# Patient Record
Sex: FEMALE | Race: WHITE | Employment: OTHER | URBAN - METROPOLITAN AREA
[De-identification: names, ages, dates, MRNs, and addresses within clinical notes are randomized per-mention and may not be internally consistent; named-entity substitution may affect disease eponyms.]

---

## 2017-06-28 ENCOUNTER — APPOINTMENT (OUTPATIENT)
Dept: GENERAL RADIOLOGY | Age: 62
End: 2017-06-28

## 2017-06-28 ENCOUNTER — HOSPITAL ENCOUNTER (EMERGENCY)
Age: 62
Discharge: OTHER INSTITUTION | End: 2017-06-28
Attending: EMERGENCY MEDICINE

## 2017-06-28 VITALS
RESPIRATION RATE: 18 BRPM | HEART RATE: 61 BPM | TEMPERATURE: 98.6 F | BODY MASS INDEX: 34.99 KG/M2 | WEIGHT: 210 LBS | OXYGEN SATURATION: 97 % | DIASTOLIC BLOOD PRESSURE: 64 MMHG | SYSTOLIC BLOOD PRESSURE: 147 MMHG | HEIGHT: 65 IN

## 2017-06-28 DIAGNOSIS — R07.9 CHEST PAIN, UNSPECIFIED TYPE: Primary | ICD-10-CM

## 2017-06-28 LAB
ALBUMIN SERPL-MCNC: 4.2 G/DL (ref 3.9–4.9)
ALP BLD-CCNC: 70 U/L (ref 40–130)
ALT SERPL-CCNC: 15 U/L (ref 0–33)
ANION GAP SERPL CALCULATED.3IONS-SCNC: 17 MEQ/L (ref 7–13)
AST SERPL-CCNC: 20 U/L (ref 0–35)
BASOPHILS ABSOLUTE: 0 K/UL (ref 0–0.2)
BASOPHILS RELATIVE PERCENT: 0.5 %
BILIRUB SERPL-MCNC: 0.3 MG/DL (ref 0–1.2)
BUN BLDV-MCNC: 17 MG/DL (ref 8–23)
CALCIUM SERPL-MCNC: 9.7 MG/DL (ref 8.6–10.2)
CHLORIDE BLD-SCNC: 101 MEQ/L (ref 98–107)
CO2: 23 MEQ/L (ref 22–29)
CREAT SERPL-MCNC: 0.75 MG/DL (ref 0.5–0.9)
D DIMER: 0.58 MG/L FEU (ref 0–0.5)
EOSINOPHILS ABSOLUTE: 0.2 K/UL (ref 0–0.7)
EOSINOPHILS RELATIVE PERCENT: 2.9 %
GFR AFRICAN AMERICAN: >60
GFR NON-AFRICAN AMERICAN: >60
GLOBULIN: 2.7 G/DL (ref 2.3–3.5)
GLUCOSE BLD-MCNC: 132 MG/DL (ref 74–109)
HCT VFR BLD CALC: 34 % (ref 37–47)
HEMOGLOBIN: 11.7 G/DL (ref 12–16)
INR BLD: 0.9
LYMPHOCYTES ABSOLUTE: 2.9 K/UL (ref 1–4.8)
LYMPHOCYTES RELATIVE PERCENT: 39.2 %
MAGNESIUM: 1.8 MG/DL (ref 1.7–2.3)
MCH RBC QN AUTO: 28.9 PG (ref 27–31.3)
MCHC RBC AUTO-ENTMCNC: 34.4 % (ref 33–37)
MCV RBC AUTO: 84 FL (ref 82–100)
MONOCYTES ABSOLUTE: 0.5 K/UL (ref 0.2–0.8)
MONOCYTES RELATIVE PERCENT: 6.1 %
NEUTROPHILS ABSOLUTE: 3.8 K/UL (ref 1.4–6.5)
NEUTROPHILS RELATIVE PERCENT: 51.3 %
PDW BLD-RTO: 14.1 % (ref 11.5–14.5)
PLATELET # BLD: 260 K/UL (ref 130–400)
POTASSIUM SERPL-SCNC: 4 MEQ/L (ref 3.5–5.1)
PROTHROMBIN TIME: 9.6 SEC (ref 9.6–12.3)
RBC # BLD: 4.05 M/UL (ref 4.2–5.4)
SODIUM BLD-SCNC: 141 MEQ/L (ref 132–144)
TOTAL PROTEIN: 6.9 G/DL (ref 6.4–8.1)
TROPONIN: <0.01 NG/ML (ref 0–0.01)
WBC # BLD: 7.5 K/UL (ref 4.8–10.8)

## 2017-06-28 PROCEDURE — 6360000002 HC RX W HCPCS: Performed by: EMERGENCY MEDICINE

## 2017-06-28 PROCEDURE — 96375 TX/PRO/DX INJ NEW DRUG ADDON: CPT

## 2017-06-28 PROCEDURE — 80053 COMPREHEN METABOLIC PANEL: CPT

## 2017-06-28 PROCEDURE — 85379 FIBRIN DEGRADATION QUANT: CPT

## 2017-06-28 PROCEDURE — 93005 ELECTROCARDIOGRAM TRACING: CPT

## 2017-06-28 PROCEDURE — 85025 COMPLETE CBC W/AUTO DIFF WBC: CPT

## 2017-06-28 PROCEDURE — 84484 ASSAY OF TROPONIN QUANT: CPT

## 2017-06-28 PROCEDURE — 96374 THER/PROPH/DIAG INJ IV PUSH: CPT

## 2017-06-28 PROCEDURE — 36415 COLL VENOUS BLD VENIPUNCTURE: CPT

## 2017-06-28 PROCEDURE — 93005 ELECTROCARDIOGRAM TRACING: CPT | Performed by: EMERGENCY MEDICINE

## 2017-06-28 PROCEDURE — 99285 EMERGENCY DEPT VISIT HI MDM: CPT

## 2017-06-28 PROCEDURE — 83735 ASSAY OF MAGNESIUM: CPT

## 2017-06-28 PROCEDURE — 71010 XR CHEST PORTABLE: CPT

## 2017-06-28 PROCEDURE — 85610 PROTHROMBIN TIME: CPT

## 2017-06-28 PROCEDURE — 6370000000 HC RX 637 (ALT 250 FOR IP): Performed by: EMERGENCY MEDICINE

## 2017-06-28 RX ORDER — MORPHINE SULFATE 2 MG/ML
2 INJECTION, SOLUTION INTRAMUSCULAR; INTRAVENOUS ONCE
Status: COMPLETED | OUTPATIENT
Start: 2017-06-28 | End: 2017-06-28

## 2017-06-28 RX ORDER — ASPIRIN 81 MG/1
243 TABLET, CHEWABLE ORAL ONCE
Status: COMPLETED | OUTPATIENT
Start: 2017-06-28 | End: 2017-06-28

## 2017-06-28 RX ORDER — SODIUM CHLORIDE 0.9 % (FLUSH) 0.9 %
3 SYRINGE (ML) INJECTION EVERY 8 HOURS
Status: DISCONTINUED | OUTPATIENT
Start: 2017-06-28 | End: 2017-06-29 | Stop reason: HOSPADM

## 2017-06-28 RX ORDER — ONDANSETRON 2 MG/ML
4 INJECTION INTRAMUSCULAR; INTRAVENOUS ONCE
Status: COMPLETED | OUTPATIENT
Start: 2017-06-28 | End: 2017-06-28

## 2017-06-28 RX ADMIN — ASPIRIN 81 MG CHEWABLE TABLET 243 MG: 81 TABLET CHEWABLE at 20:06

## 2017-06-28 RX ADMIN — Medication 400 MG: at 21:08

## 2017-06-28 RX ADMIN — ONDANSETRON 4 MG: 2 INJECTION INTRAMUSCULAR; INTRAVENOUS at 22:29

## 2017-06-28 RX ADMIN — NITROGLYCERIN 0.5 INCH: 20 OINTMENT TOPICAL at 20:06

## 2017-06-28 RX ADMIN — MORPHINE SULFATE 2 MG: 2 INJECTION, SOLUTION INTRAMUSCULAR; INTRAVENOUS at 22:29

## 2017-06-28 ASSESSMENT — PAIN DESCRIPTION - ORIENTATION: ORIENTATION: LEFT

## 2017-06-28 ASSESSMENT — ENCOUNTER SYMPTOMS
ABDOMINAL PAIN: 0
CHEST TIGHTNESS: 0
FACIAL SWELLING: 0
EYE DISCHARGE: 0
EYE REDNESS: 0
CHOKING: 0
WHEEZING: 0
EYE PAIN: 0
COUGH: 1
SORE THROAT: 0
SINUS PRESSURE: 0
BACK PAIN: 0
CONSTIPATION: 0
STRIDOR: 0
TROUBLE SWALLOWING: 0
DIARRHEA: 0
BLOOD IN STOOL: 0
VOICE CHANGE: 0
VOMITING: 0
SHORTNESS OF BREATH: 0

## 2017-06-28 ASSESSMENT — PAIN DESCRIPTION - PAIN TYPE
TYPE: ACUTE PAIN
TYPE: ACUTE PAIN

## 2017-06-28 ASSESSMENT — PAIN DESCRIPTION - LOCATION
LOCATION: BREAST
LOCATION: CHEST
LOCATION: CHEST

## 2017-06-28 ASSESSMENT — PAIN DESCRIPTION - DESCRIPTORS
DESCRIPTORS: SHARP
DESCRIPTORS: ACHING

## 2017-06-28 ASSESSMENT — PAIN SCALES - GENERAL
PAINLEVEL_OUTOF10: 8
PAINLEVEL_OUTOF10: 6
PAINLEVEL_OUTOF10: 5
PAINLEVEL_OUTOF10: 4

## 2017-07-02 LAB
EKG ATRIAL RATE: 67 BPM
EKG P AXIS: 16 DEGREES
EKG P-R INTERVAL: 164 MS
EKG Q-T INTERVAL: 406 MS
EKG QRS DURATION: 104 MS
EKG QTC CALCULATION (BAZETT): 429 MS
EKG R AXIS: -19 DEGREES
EKG T AXIS: 27 DEGREES
EKG VENTRICULAR RATE: 67 BPM

## 2019-04-12 ENCOUNTER — OFFICE VISIT (OUTPATIENT)
Dept: INTERNAL MEDICINE | Age: 64
End: 2019-04-12
Payer: MEDICARE

## 2019-04-12 VITALS
SYSTOLIC BLOOD PRESSURE: 128 MMHG | TEMPERATURE: 98 F | BODY MASS INDEX: 34.79 KG/M2 | RESPIRATION RATE: 18 BRPM | HEART RATE: 87 BPM | DIASTOLIC BLOOD PRESSURE: 72 MMHG | WEIGHT: 208.8 LBS | HEIGHT: 65 IN | OXYGEN SATURATION: 96 %

## 2019-04-12 DIAGNOSIS — J06.9 UPPER RESPIRATORY TRACT INFECTION, UNSPECIFIED TYPE: Primary | ICD-10-CM

## 2019-04-12 PROCEDURE — 1036F TOBACCO NON-USER: CPT | Performed by: NURSE PRACTITIONER

## 2019-04-12 PROCEDURE — 3017F COLORECTAL CA SCREEN DOC REV: CPT | Performed by: NURSE PRACTITIONER

## 2019-04-12 PROCEDURE — 1123F ACP DISCUSS/DSCN MKR DOCD: CPT | Performed by: NURSE PRACTITIONER

## 2019-04-12 PROCEDURE — G8400 PT W/DXA NO RESULTS DOC: HCPCS | Performed by: NURSE PRACTITIONER

## 2019-04-12 PROCEDURE — G8417 CALC BMI ABV UP PARAM F/U: HCPCS | Performed by: NURSE PRACTITIONER

## 2019-04-12 PROCEDURE — 1090F PRES/ABSN URINE INCON ASSESS: CPT | Performed by: NURSE PRACTITIONER

## 2019-04-12 PROCEDURE — 99203 OFFICE O/P NEW LOW 30 MIN: CPT | Performed by: NURSE PRACTITIONER

## 2019-04-12 PROCEDURE — 4040F PNEUMOC VAC/ADMIN/RCVD: CPT | Performed by: NURSE PRACTITIONER

## 2019-04-12 PROCEDURE — G8427 DOCREV CUR MEDS BY ELIG CLIN: HCPCS | Performed by: NURSE PRACTITIONER

## 2019-04-12 RX ORDER — FLECAINIDE ACETATE 50 MG/1
TABLET ORAL
Refills: 3 | COMMUNITY
Start: 2019-04-11 | End: 2020-11-16 | Stop reason: ALTCHOICE

## 2019-04-12 RX ORDER — TIZANIDINE 4 MG/1
TABLET ORAL
Refills: 1 | COMMUNITY
Start: 2019-04-09 | End: 2020-11-16 | Stop reason: ALTCHOICE

## 2019-04-12 RX ORDER — AMOXICILLIN 500 MG/1
500 CAPSULE ORAL 2 TIMES DAILY
Qty: 20 CAPSULE | Refills: 0 | Status: SHIPPED | OUTPATIENT
Start: 2019-04-12 | End: 2019-04-22

## 2019-04-12 RX ORDER — CALCIUM CARBONATE/VITAMIN D3 500-10/5ML
400 LIQUID (ML) ORAL
COMMUNITY

## 2019-04-12 RX ORDER — OMEPRAZOLE 40 MG/1
40 CAPSULE, DELAYED RELEASE ORAL
COMMUNITY
Start: 2019-01-10 | End: 2020-11-16 | Stop reason: ALTCHOICE

## 2019-04-12 RX ORDER — GABAPENTIN 300 MG/1
CAPSULE ORAL
Refills: 2 | COMMUNITY
Start: 2019-03-24 | End: 2020-11-16 | Stop reason: ALTCHOICE

## 2019-04-12 RX ORDER — FAMOTIDINE 20 MG
1000 TABLET ORAL
COMMUNITY

## 2019-04-12 ASSESSMENT — ENCOUNTER SYMPTOMS
COUGH: 1
SINUS PRESSURE: 0
SORE THROAT: 1
RHINORRHEA: 0

## 2019-04-12 NOTE — PROGRESS NOTES
Patient: Jason Sanchez    YOB: 1955    Date: 4/19/19     There are no active problems to display for this patient. Past Medical History:   Diagnosis Date    Atrial fibrillation ([address]     Hyperlipidemia      Past Surgical History:   Procedure Laterality Date    APPENDECTOMY      BACK SURGERY      CHOLECYSTECTOMY      COLON SURGERY      HYSTERECTOMY       History reviewed. No pertinent family history.   Social History     Socioeconomic History    Marital status:      Spouse name: Not on file    Number of children: Not on file    Years of education: Not on file    Highest education level: Not on file   Occupational History    Not on file   Social Needs    Financial resource strain: Not on file    Food insecurity:     Worry: Not on file     Inability: Not on file    Transportation needs:     Medical: Not on file     Non-medical: Not on file   Tobacco Use    Smoking status: Never Smoker    Smokeless tobacco: Never Used   Substance and Sexual Activity    Alcohol use: No    Drug use: No    Sexual activity: Not on file   Lifestyle    Physical activity:     Days per week: Not on file     Minutes per session: Not on file    Stress: Not on file   Relationships    Social connections:     Talks on phone: Not on file     Gets together: Not on file     Attends [employer] service: Not on file     Active member of club or organization: Not on file     Attends meetings of clubs or organizations: Not on file     Relationship status: Not on file    Intimate partner violence:     Fear of current or ex partner: Not on file     Emotionally abused: Not on file     Physically abused: Not on file     Forced sexual activity: Not on file   Other Topics Concern    Not on file   Social History Narrative    Not on file     Current Outpatient Medications on File Prior to Visit   Medication Sig Dispense Refill    gabapentin (NEURONTIN) 300 MG capsule   2    omeprazole (PRILOSEC) 40 MG delayed rate, regular rhythm and normal heart sounds. Pulmonary/Chest: Effort normal and breath sounds normal.   Musculoskeletal: Normal range of motion. Neurological: She is alert and oriented to person, place, and time. Skin: Skin is warm and dry. She is not diaphoretic. Psychiatric: She has a normal mood and affect. Assessment:  Sherly Botello was seen today for otalgia. Diagnoses and all orders for this visit:    Upper respiratory tract infection, unspecified type  -     amoxicillin (AMOXIL) 500 MG capsule; Take 1 capsule by mouth 2 times daily for 10 days      Advised supportive care, over the counter analgesics for symptomatic therapy, tylenol/motrin for fevers, Fluids, rest. Take antibiotic until finished. Encouraged patient to take probiotics and/or eat yogurt during treatment. Signs of worsening infection explained, signs of dehydration explained, to seek medical attention if they occur  Patient states understanding and agreement of treatment plan. Return for Follow up with pcp.

## 2020-03-18 ENCOUNTER — HOSPITAL ENCOUNTER (OUTPATIENT)
Age: 65
Setting detail: SPECIMEN
Discharge: HOME | End: 2020-03-18
Payer: MEDICARE

## 2020-03-18 LAB
REASON FOR REJECTION: NORMAL
REJECTED TEST: NORMAL

## 2020-03-19 ENCOUNTER — HOSPITAL ENCOUNTER (OUTPATIENT)
Age: 65
Setting detail: SPECIMEN
Discharge: HOME | End: 2020-03-19
Payer: MEDICARE

## 2020-03-19 LAB
INR BLD: 2
PROTHROMBIN TIME: 23.9 SEC (ref 12.3–14.9)

## 2020-03-19 PROCEDURE — 85610 PROTHROMBIN TIME: CPT

## 2020-11-16 ENCOUNTER — OFFICE VISIT (OUTPATIENT)
Dept: INTERNAL MEDICINE | Age: 65
End: 2020-11-16
Payer: MEDICARE

## 2020-11-16 VITALS — HEIGHT: 65 IN | WEIGHT: 212 LBS | BODY MASS INDEX: 35.32 KG/M2

## 2020-11-16 PROCEDURE — G2025 DIS SITE TELE SVCS RHC/FQHC: HCPCS | Performed by: NURSE PRACTITIONER

## 2020-11-16 RX ORDER — DOFETILIDE 0.5 MG/1
CAPSULE ORAL
COMMUNITY
Start: 2020-10-14

## 2020-11-16 RX ORDER — WARFARIN SODIUM 2.5 MG/1
TABLET ORAL
COMMUNITY
Start: 2020-11-03

## 2020-11-16 RX ORDER — HYDROXYCHLOROQUINE SULFATE 200 MG/1
TABLET, FILM COATED ORAL
COMMUNITY
Start: 2020-10-21

## 2020-11-16 RX ORDER — PREGABALIN 75 MG/1
CAPSULE ORAL
COMMUNITY
Start: 2020-10-16

## 2020-11-16 RX ORDER — PANTOPRAZOLE SODIUM 40 MG/1
TABLET, DELAYED RELEASE ORAL
COMMUNITY
Start: 2020-08-26

## 2020-11-16 RX ORDER — CYCLOBENZAPRINE HCL 10 MG
TABLET ORAL
COMMUNITY
Start: 2020-09-23

## 2020-11-16 RX ORDER — ACEBUTOLOL HYDROCHLORIDE 200 MG/1
CAPSULE ORAL
COMMUNITY
Start: 2020-10-14

## 2020-11-16 ASSESSMENT — ENCOUNTER SYMPTOMS
FACIAL SWELLING: 0
DIARRHEA: 1
WHEEZING: 0
TROUBLE SWALLOWING: 0
COUGH: 1
SORE THROAT: 1
SHORTNESS OF BREATH: 0
CHEST TIGHTNESS: 0

## 2020-11-16 ASSESSMENT — PATIENT HEALTH QUESTIONNAIRE - PHQ9
SUM OF ALL RESPONSES TO PHQ QUESTIONS 1-9: 0
2. FEELING DOWN, DEPRESSED OR HOPELESS: 0
SUM OF ALL RESPONSES TO PHQ QUESTIONS 1-9: 0
SUM OF ALL RESPONSES TO PHQ9 QUESTIONS 1 & 2: 0
1. LITTLE INTEREST OR PLEASURE IN DOING THINGS: 0
SUM OF ALL RESPONSES TO PHQ QUESTIONS 1-9: 0

## 2020-11-16 NOTE — PROGRESS NOTES
Matthews Cheadle, 77 y.o. female presents today with:  Chief Complaint   Patient presents with    Diarrhea     started friday    Pharyngitis     started yesterday, loss of tatse fatigue, someone in [place] +       HPI     Diarrhea on Friday  Sat/sun headache  Sore throat  Cold like symptoms  Exposure to covid+ in [place]  Weak    Patient reports:    [] Fever [] Shortness of breath [x] Diarrhea    [x] Cough [] Chest pain/tightness [] Working in healthcare    [] Loss of sense of smell [x] Loss of sense of taste    [] History of travel to COVID-19 infested area    [x] Close contact to known COVID-19 person    []        Vitals:    11/16/20 1106   Weight: 212 lb (96.2 kg)   Height: 5' 5\" (1.651 m)        Review of Systems   Constitutional: Negative for chills, fatigue and fever. HENT: Positive for sore throat. Negative for congestion, facial swelling and trouble swallowing. Respiratory: Positive for cough. Negative for chest tightness, shortness of breath and wheezing. Cardiovascular: Negative for chest pain and palpitations. Gastrointestinal: Positive for diarrhea. Musculoskeletal: Negative for arthralgias, myalgias and neck pain. Skin: Negative for rash. Neurological: Positive for headaches. Negative for dizziness and light-headedness. Loss of taste        Physical Exam     Due to the current efforts to prevent transmission of COVID-19 and also the need to preserve PPE for other caregivers, a face-to-face encounter with the patient was not performed. That being said, all relevant records and diagnostic tests were reviewed, including laboratory results and imaging. Please reference any relevant documentation elsewhere. Care will be coordinated with the primary service. Assessment/Plan:  1. Suspected COVID-19 virus infection    - COVID-19 Ambulatory;  Future  - Self quarantine, only going out for Dr's appts/essentials  - OTC symptom control  - Continue to wear mask, social distance, and wash hands frequently  - Call 911 or go to the ER should symptoms become difficult to manage      Telephone visit 11 minutes     YOSI Pollard  11/16/20     This telephone visit was provided as a focused evaluation during the [place] -19 pandemic/national emergency. A comprehensive review of all previous patient history and testing was not conducted. Pertinent findings were elicited during the visit.

## 2020-11-17 DIAGNOSIS — Z20.822 SUSPECTED COVID-19 VIRUS INFECTION: ICD-10-CM

## 2020-11-19 ENCOUNTER — TELEPHONE (OUTPATIENT)
Dept: INTERNAL MEDICINE | Age: 65
End: 2020-11-19

## 2020-11-19 LAB
SARS-COV-2: NOT DETECTED
SOURCE: NORMAL

## 2020-11-19 NOTE — TELEPHONE ENCOUNTER
Pt is wanting results for Covid and letter stating she is Negative. (providing she is) thank you She does need this ASAP she says

## 2021-08-23 ENCOUNTER — OFFICE VISIT (OUTPATIENT)
Dept: PAIN MANAGEMENT | Age: 66
End: 2021-08-23
Payer: MEDICARE

## 2021-08-23 VITALS
WEIGHT: 196 LBS | BODY MASS INDEX: 33.46 KG/M2 | TEMPERATURE: 96.3 F | HEART RATE: 56 BPM | HEIGHT: 64 IN | DIASTOLIC BLOOD PRESSURE: 64 MMHG | SYSTOLIC BLOOD PRESSURE: 97 MMHG

## 2021-08-23 DIAGNOSIS — Z98.1 HISTORY OF LUMBAR SPINAL FUSION: ICD-10-CM

## 2021-08-23 DIAGNOSIS — M96.1 POSTLAMINECTOMY SYNDROME, LUMBAR: Primary | ICD-10-CM

## 2021-08-23 PROCEDURE — G8417 CALC BMI ABV UP PARAM F/U: HCPCS | Performed by: PHYSICAL MEDICINE & REHABILITATION

## 2021-08-23 PROCEDURE — 99203 OFFICE O/P NEW LOW 30 MIN: CPT | Performed by: PHYSICAL MEDICINE & REHABILITATION

## 2021-08-23 PROCEDURE — G8400 PT W/DXA NO RESULTS DOC: HCPCS | Performed by: PHYSICAL MEDICINE & REHABILITATION

## 2021-08-23 PROCEDURE — 1090F PRES/ABSN URINE INCON ASSESS: CPT | Performed by: PHYSICAL MEDICINE & REHABILITATION

## 2021-08-23 PROCEDURE — 1123F ACP DISCUSS/DSCN MKR DOCD: CPT | Performed by: PHYSICAL MEDICINE & REHABILITATION

## 2021-08-23 PROCEDURE — 3017F COLORECTAL CA SCREEN DOC REV: CPT | Performed by: PHYSICAL MEDICINE & REHABILITATION

## 2021-08-23 PROCEDURE — 1036F TOBACCO NON-USER: CPT | Performed by: PHYSICAL MEDICINE & REHABILITATION

## 2021-08-23 PROCEDURE — 4040F PNEUMOC VAC/ADMIN/RCVD: CPT | Performed by: PHYSICAL MEDICINE & REHABILITATION

## 2021-08-23 PROCEDURE — G8427 DOCREV CUR MEDS BY ELIG CLIN: HCPCS | Performed by: PHYSICAL MEDICINE & REHABILITATION

## 2021-08-23 ASSESSMENT — ENCOUNTER SYMPTOMS
CONSTIPATION: 0
DIARRHEA: 0
NAUSEA: 0
SHORTNESS OF BREATH: 0
BACK PAIN: 1

## 2021-08-23 NOTE — PROGRESS NOTES
Mercedes Riojas  (1955)    8/23/2021    Subjective:     Mercedes Riojas is 74 y.o. female who complains today of:    Chief Complaint   Patient presents with    Back Pain       Mercedes Riojas is a 74 y.o. female who presents for evaluation for chronic pain, history of lumbar spine surgeries, pain stimulator. She is accompanied by her  Janet Stratton whom she permits to be present during the history and exam.    She has struggled with pain since 2012. She denies any immediately-preceding traumatic or inciting events. She has been previously evaluated by Dr Jos Benz whose records are reviewed below. She describes pain located in both sides of her low back with pain into both legs. Pain is a constant ache and is currently a 7/10 and gets up to a 9/10 at its worst and goes down to a 6/10 at its best. Pain is worse with walking and standing. Pain is better with laying down. Pain is located 40% on the right and 60% on the left. Pain is located 50% in the back and 50% in the legs. She denies any numbness, tingling, weakness, bowel or bladder dysfunction, saddle anesthesia, falls, history of cancer, unexplained weight loss, persistent night pain and sweats, fever, IV drug abuse, immunocompromise, chronic prednisone or antibiotic use, or any other red flag symptoms. Mood is down, denies any suicidal or homicidal ideation. Sleep is good, awakes refreshed. She has tried:  Home exercise program with minimal relief  Pain management with Dr Bere Cuello the patient is forthright about discharge from Dr Jos Benz for oral tox screen positive for suboxone which the patient states she never took.    Abbot pain stimulator replaced August 2021 Dr Willian Trujillo  PT completed  Used to live in [facility], where she had PT, injections  Dr Kobi Campo performed lumbar spine surgery    Diagnostic testing previously performed includes XRs    Medications tried include:  Acetaminophen with minimal relief for over 3 months  Ibuprofen with minimal relief for over 3 months  Dilaudid 4 mg TID prn from Dr Delmy Massey, last Rx 8/2/21  Lyrica 75 mg TID   Cyclobenzaprine 10 mg     Allergies, Medications, Past Medical History, Family History, Social History, Work History, and Review of Systems reviewed below    +atrial fibrillation on Warfarin  +gastric sleeve surgery  +snores with negative sleep apnea evaluation    No Seizures, Epilepsy or Brain Surgery     Spends her time: retired, used to work as ICU nurse cardiac at [employer]. Forced into custodial after her last spine surgery. She used to enjoy bowling, walking. Allergies:  Advair diskus [fluticasone-salmeterol] and Sulfa antibiotics    Past Medical History:   Diagnosis Date    Atrial fibrillation ([address]     Hyperlipidemia      Past Surgical History:   Procedure Laterality Date    APPENDECTOMY      BACK SURGERY      CHOLECYSTECTOMY      COLON SURGERY      HYSTERECTOMY       History reviewed. No pertinent family history. Social History     Socioeconomic History    Marital status:      Spouse name: Not on file    Number of children: Not on file    Years of education: Not on file    Highest education level: Not on file   Occupational History    Not on file   Tobacco Use    Smoking status: Never Smoker    Smokeless tobacco: Never Used   Substance and Sexual Activity    Alcohol use: No    Drug use: No    Sexual activity: Not on file   Other Topics Concern    Not on file   Social History Narrative    Not on file     Social Determinants of Health     Financial Resource Strain:     Difficulty of Paying Living Expenses:    Food Insecurity:     Worried About Running Out of Food in the Last Year:     [address] in the Last Year:    Transportation Needs:     Lack of Transportation (Medical):      Lack of Transportation (Non-Medical):    Physical Activity:     Days of Exercise per Week:     Minutes of Exercise per Session:    Stress:     Feeling of Stress : Social Connections:     Frequency of Communication with Friends and Family:     Frequency of Social Gatherings with Friends and Family:     Attends [employer] Services:     Active Member of Clubs or Organizations:     Attends Club or Organization Meetings:     Marital Status:    Intimate Partner Violence:     Fear of Current or Ex-Partner:     Emotionally Abused:     Physically Abused:     Sexually Abused:        Current Outpatient Medications on File Prior to Visit   Medication Sig Dispense Refill    pantoprazole (PROTONIX) 40 MG tablet TAKE 1 TABLET BY MOUTH ONCE DAILY. (NEEDS OFFICE VISIT FOR REFILLS).  pregabalin (LYRICA) 75 MG capsule TAKE 1 CAPSULE BY MOUTH THREE TIMES DAILY      warfarin (COUMADIN) 2.5 MG tablet TAKE 1 2 (ONE HALF) TABLET BY MOUTH EVERY FRIDAY AND 1 TABLET BY MOUTH ALL OTHER DAYS OF THE WEEK OR AS DIRECTED BY COUMADIN CLINIC      acebutolol (SECTRAL) 200 MG capsule TAKE 1 CAPSULE BY MOUTH TWICE DAILY      cyclobenzaprine (FLEXERIL) 10 MG tablet TAKE 1 TABLET BY MOUTH THREE TIMES DAILY AS NEEDED      dofetilide (TIKOSYN) 500 MCG capsule       Vitamin D, Cholecalciferol, 1000 units CAPS Take 1,000 Units by mouth      Magnesium Oxide 400 MG CAPS Take 400 mg by mouth      Citalopram Hydrobromide (CELEXA PO) Take 20 mg by mouth daily       Multiple Vitamins-Minerals (MULTIVITAMIN ADULTS PO) Take by mouth      hydroxychloroquine (PLAQUENIL) 200 MG tablet TAKE 1 TABLET BY MOUTH TWICE DAILY (Patient not taking: Reported on 8/23/2021)      Oxycodone-Acetaminophen (PERCOCET PO) Take by mouth 2 times daily as needed  (Patient not taking: Reported on 8/23/2021)       No current facility-administered medications on file prior to visit. Review of Systems   Constitutional: Negative for fever. HENT: Negative for hearing loss. Respiratory: Negative for shortness of breath. Gastrointestinal: Negative for constipation, diarrhea and nausea.    Genitourinary: Negative for difficulty urinating. Musculoskeletal: Positive for back pain. Negative for neck pain. Skin: Negative for rash. Neurological: Negative for headaches. Hematological: Does not bruise/bleed easily. Psychiatric/Behavioral: Negative for sleep disturbance. Objective:     Vitals:  BP 97/64 (Site: Left Upper Arm, Position: Sitting, Cuff Size: Medium Adult)   Pulse 56   Temp 96.3 °F (35.7 °C) (Axillary)   Ht 5' 4\" (1.626 m)   Wt 196 lb (88.9 kg)   BMI 33.64 kg/m² Pain Score:   7      Exam performed under Coronavirus precautions  Gen: No acute distress  Neck: Grossly symmetric without any significant thyromegaly or masses appreciated. Eyes: No scleral icterus or lid lag appreciated bilaterally. Irises without gross defects bilaterally. HEENT: Hearing grossly intact bilaterally. Normocephalic, external ears and visible portions of nose and mouth atraumatic. Lymph: No gross neck or axillary lymphadenopathy  Cardio: No significant lower extremity edema, pulses intact without significant digit ischemia. Abd: No gross masses or large hernias appreciated. Skin: Visualized skin without any dermatomal rashes or sores. Palpation free of any tightening or subcutaneous nodules. MSK: Gait is antalgic. No significant upper limb digit ischemia appreciated. Psych: Pleasant and cooperative with the history and exam. Mood and Affect normal. Appropriately dressed with good eye contact. Judgement and insight normal. Recent and remote memory intact. Alert and Oriented x3. Neuro: Cranial nerves II-XII grossly intact. No significant pathologic reflexes appreciated. Rises from a seated to standing position with moderate difficulty. Gait is antalgic. +sp cane    Heel and toe walk intact. Lumbar flexion to 40 degrees, extension to neutral. Limited lumbar spine range of motion. Rotation and extension reproduces axial low back pain. Other facet provocative maneuvers are positive. No gross step offs noted. Tenderness to palpation over the mid to low lumbar spinous processes and bilateral lumbar paraspinals from L2 down to the sacrum. No tenderness over bilateral PSIS. No tenderness over bilateral greater trochanters. No tenderness over bilateral deep gluteal regions. Sensation grossly intact in both legs except for S1 paresthesias  Reflexes and strength functional for ambulation, no abnormal reflexes appreciated on exam today  Strength greater than 3/5 bilateral legs  Straight leg raise negative bilaterally. Outside record review:  Dr Dane Cohen 7/1/21: 65F with postlaminectomy syndrome s/p spinal cord stimulation implant requiring maintenance on opiate. Requiring revision proceed with Dr Harish Campbell on 8/5/21. tox screen today. Dr Dane Cohen 4/6/21: revision with Dr Harish Campbell    Neurosurgery progress note 8/9/2021: Abbott paddle SCS placed 2016 outside hospital.  Status post removal prior thoracic spinal cord stimulator paddle leads and generator. Replacement and implantation of thoracic paddle leads and generator. CT LS Spine 9/11/20: pedicle screw katharina fusion L2 through S1 extended through at least T11. No hardware fractures or screw loosening noted. Laminectomy L2-S1. Interbody spacing devices L3/4 through L5/S1. L5 with anterolisthesis. T11-12 no stenosis. T12-L1 left foramen mildly stenosed. L1-L2 mild stenosis left worse than right. Degenerative disease and facet arthropathy throughout. L2-3 healed L2 vertebral body fracture. L3-4 no canal bony stenosis. L4-5 left foramen mildly stenosed. L5-S1 right L5-S1 foramen filled with soft tissue. XR T-spine 8/22/2020: Thoracolumbar fusion T10 to ilium. Spinal stimulator. Residual portions hardware from prior surgeries prior ghost tracks. No fracture. XR L-spine 8/22/2020: Same as above. Platelet 957 normal on 8/16/21  Creatinine 1.12 elevated on 8/16/21.         Family history of alcohol abuse 0  Family history of illegal drug abuse 0  Family history of prescription drug abuse 0    Personal history of alcohol abuse/DUI 0  Personal history of illegal drug abuse 0  Personal history of prescription drug abuse 0    Age between 15-45 0    History of preadolescent sexual abuse 0    Personal history of obsessive compulsive disorder 0  Personal history of attention deficit disorder 0  Personal history of bipolar disorder 0  Personal history of schizophrenia 0  Personal history of depression 0    Score = 0, low risk    Assessment:      Diagnosis Orders   1. Postlaminectomy syndrome, lumbar  Amb External Referral To Internal Medicine   2. History of lumbar spinal fusion         Plan:     Periodic Controlled Substance Monitoring: Potential drug abuse or diversion identified, see note documentation. Nicolás Rahman MD)    No orders of the defined types were placed in this encounter. Orders Placed This Encounter   Procedures    Amb External Referral To Internal Medicine     Referral Priority:   Routine     Referral Type:   Consult for Advice and Opinion     Requested Specialty:   Internal Medicine     Number of Visits Requested:   1       -Advised to follow up with PCP and/or Nephrology for elevated creatinine. Follow up with Spine surgery and spinal cord stimulator provider as recommended. Difficult situation. The patient suffers from chronic pain and is status post multiple lumbar spine surgeries with extension of lumbar spine fusion thoracic to sacrum. Imaging that is available to me from September 2020 shows that the hardware is intact. The patient confirms that she follows up as needed with her lumbar spine surgeon. Her history and physical exam are free of any weakness red flags or any other signs of neurologic impairment. The patient is forthright regarding her prior pain management discharge from Dr. Sylvain De Leon due to unexplained Suboxone on an oral toxicology screen. Prior to this she had been maintained on Dilaudid 4 mg 3 times daily.   She is unable to take NSAIDs due to Warfarin, kidney disease, and gastric sleeve surgery. She has exhausted conservative treatment, physical therapy makes her pain worse, her extensive lumbosacral fusion limits and interventions, and no further surgery is recommended. I discussed that given the prior pain management discharge, that I would be unable to assist with short acting opioid pain medications. We will provide her with referral to Suboxone providers to see if he may be a candidate for chronic opiate treatment on Suboxone. The patient expresses frustration, but understanding. Controlled Substance Monitoring:    Acute and Chronic Pain Monitoring:   RX Monitoring 8/23/2021   Periodic Controlled Substance Monitoring Potential drug abuse or diversion identified, see note documentation. Provided education and counseling regarding the diagnosis, prognosis, and treatment options. All questions were answered. Encouraged her to follow-up with her primary care physician and/or specialists as required for her overall health and management of her comorbidities as well as any new positive symptoms mentioned in review of systems above. Care was provided within the definitions and limitations of our specialty practice. Encouraged lifestyle interventions including healthy habits, lifestyle changes, regular aerobic exercise and appropriate weight maintenance as advised by their primary care physician or cardiovascular health provider. Discussed well care and disease prevention/maintenance. Encouraged compliance with her home exercise program.     Discussed the risks of temporary disability, permanent disability, morbidity, and mortality with poorly-managed or undiagnosed medical conditions and comorbidities. Emphasized the importance of timely medical evaluation and treatment as previously recommended by us or other medical professionals. Risks of not pursing these recommendations were emphasized.     She expressed complete understanding and agreement with the entire plan as outlined above. Portions of this note may have been typed, auto-populated, dictated or transcribed by voice recognition resulting in errors, omissions, or close substitutions which may be missed despite careful proofreading. Please contact the author for any questions or concerns. Thank you Dr. Brittny Rasheed for the opportunity to participate in this patient's care. If you have any questions or concerns, please do not hesitate to contact us.        Follow up:  Return if symptoms worsen or fail to improve, for reassessment of pain and symptoms, External Referral.    Rebekah Arenas MD

## 2023-12-10 ENCOUNTER — APPOINTMENT (OUTPATIENT)
Dept: RADIOLOGY | Facility: HOSPITAL | Age: 68
End: 2023-12-10
Payer: MEDICARE

## 2023-12-10 ENCOUNTER — APPOINTMENT (OUTPATIENT)
Dept: CARDIOLOGY | Facility: HOSPITAL | Age: 68
End: 2023-12-10
Payer: MEDICARE

## 2023-12-10 ENCOUNTER — HOSPITAL ENCOUNTER (OUTPATIENT)
Facility: HOSPITAL | Age: 68
Setting detail: OBSERVATION
Discharge: HOME | End: 2023-12-11
Attending: STUDENT IN AN ORGANIZED HEALTH CARE EDUCATION/TRAINING PROGRAM | Admitting: FAMILY MEDICINE
Payer: MEDICARE

## 2023-12-10 DIAGNOSIS — E83.42 HYPOMAGNESEMIA: ICD-10-CM

## 2023-12-10 DIAGNOSIS — R53.1 WEAKNESS: ICD-10-CM

## 2023-12-10 DIAGNOSIS — U07.1 COVID: Primary | ICD-10-CM

## 2023-12-10 LAB
ALBUMIN SERPL BCP-MCNC: 3.8 G/DL (ref 3.4–5)
ALP SERPL-CCNC: 85 U/L (ref 33–136)
ALT SERPL W P-5'-P-CCNC: 35 U/L (ref 7–45)
ANION GAP SERPL CALC-SCNC: 14 MMOL/L (ref 10–20)
APPEARANCE UR: CLEAR
AST SERPL W P-5'-P-CCNC: 55 U/L (ref 9–39)
BASE EXCESS BLDV CALC-SCNC: -7.5 MMOL/L (ref -2–3)
BASOPHILS # BLD AUTO: 0.02 X10*3/UL (ref 0–0.1)
BASOPHILS NFR BLD AUTO: 0.4 %
BILIRUB SERPL-MCNC: 0.4 MG/DL (ref 0–1.2)
BILIRUB UR STRIP.AUTO-MCNC: NEGATIVE MG/DL
BNP SERPL-MCNC: 78 PG/ML (ref 0–99)
BODY TEMPERATURE: ABNORMAL
BUN SERPL-MCNC: 10 MG/DL (ref 6–23)
CALCIUM SERPL-MCNC: 8.8 MG/DL (ref 8.6–10.3)
CARDIAC TROPONIN I PNL SERPL HS: 8 NG/L (ref 0–13)
CARDIAC TROPONIN I PNL SERPL HS: 8 NG/L (ref 0–13)
CHLORIDE SERPL-SCNC: 106 MMOL/L (ref 98–107)
CO2 SERPL-SCNC: 23 MMOL/L (ref 21–32)
COLOR UR: YELLOW
CREAT SERPL-MCNC: 1.07 MG/DL (ref 0.5–1.05)
EOSINOPHIL # BLD AUTO: 0.17 X10*3/UL (ref 0–0.7)
EOSINOPHIL NFR BLD AUTO: 3.7 %
ERYTHROCYTE [DISTWIDTH] IN BLOOD BY AUTOMATED COUNT: 16.4 % (ref 11.5–14.5)
FLUAV RNA RESP QL NAA+PROBE: NOT DETECTED
FLUBV RNA RESP QL NAA+PROBE: NOT DETECTED
GFR SERPL CREATININE-BSD FRML MDRD: 56 ML/MIN/1.73M*2
GLUCOSE SERPL-MCNC: 102 MG/DL (ref 74–99)
GLUCOSE UR STRIP.AUTO-MCNC: NEGATIVE MG/DL
HCO3 BLDV-SCNC: 18.8 MMOL/L (ref 22–26)
HCT VFR BLD AUTO: 31.1 % (ref 36–46)
HGB BLD-MCNC: 9.7 G/DL (ref 12–16)
HOLD SPECIMEN: NORMAL
HYALINE CASTS #/AREA URNS AUTO: ABNORMAL /LPF
IMM GRANULOCYTES # BLD AUTO: 0.01 X10*3/UL (ref 0–0.7)
IMM GRANULOCYTES NFR BLD AUTO: 0.2 % (ref 0–0.9)
INHALED O2 CONCENTRATION: 21 %
KETONES UR STRIP.AUTO-MCNC: ABNORMAL MG/DL
LEUKOCYTE ESTERASE UR QL STRIP.AUTO: ABNORMAL
LYMPHOCYTES # BLD AUTO: 0.78 X10*3/UL (ref 1.2–4.8)
LYMPHOCYTES NFR BLD AUTO: 17 %
MAGNESIUM SERPL-MCNC: 1.54 MG/DL (ref 1.6–2.4)
MCH RBC QN AUTO: 24.5 PG (ref 26–34)
MCHC RBC AUTO-ENTMCNC: 31.2 G/DL (ref 32–36)
MCV RBC AUTO: 79 FL (ref 80–100)
MONOCYTES # BLD AUTO: 0.3 X10*3/UL (ref 0.1–1)
MONOCYTES NFR BLD AUTO: 6.5 %
NEUTROPHILS # BLD AUTO: 3.32 X10*3/UL (ref 1.2–7.7)
NEUTROPHILS NFR BLD AUTO: 72.2 %
NITRITE UR QL STRIP.AUTO: NEGATIVE
NRBC BLD-RTO: 0 /100 WBCS (ref 0–0)
OXYHGB MFR BLDV: 87.3 % (ref 45–75)
PCO2 BLDV: 40 MM HG (ref 41–51)
PH BLDV: 7.28 PH (ref 7.33–7.43)
PH UR STRIP.AUTO: 7 [PH]
PLATELET # BLD AUTO: 241 X10*3/UL (ref 150–450)
PO2 BLDV: 61 MM HG (ref 35–45)
POTASSIUM SERPL-SCNC: 3.6 MMOL/L (ref 3.5–5.3)
PROT SERPL-MCNC: 6.4 G/DL (ref 6.4–8.2)
PROT UR STRIP.AUTO-MCNC: ABNORMAL MG/DL
RBC # BLD AUTO: 3.96 X10*6/UL (ref 4–5.2)
RBC # UR STRIP.AUTO: NEGATIVE /UL
RBC #/AREA URNS AUTO: ABNORMAL /HPF
SAO2 % BLDV: 90 % (ref 45–75)
SARS-COV-2 RNA RESP QL NAA+PROBE: DETECTED
SODIUM SERPL-SCNC: 139 MMOL/L (ref 136–145)
SP GR UR STRIP.AUTO: 1.02
TEST COMMENT: ABNORMAL
UROBILINOGEN UR STRIP.AUTO-MCNC: 4 MG/DL
WBC # BLD AUTO: 4.6 X10*3/UL (ref 4.4–11.3)
WBC #/AREA URNS AUTO: ABNORMAL /HPF

## 2023-12-10 PROCEDURE — 71046 X-RAY EXAM CHEST 2 VIEWS: CPT | Mod: FOREIGN READ | Performed by: RADIOLOGY

## 2023-12-10 PROCEDURE — 99222 1ST HOSP IP/OBS MODERATE 55: CPT | Performed by: NURSE PRACTITIONER

## 2023-12-10 PROCEDURE — 85025 COMPLETE CBC W/AUTO DIFF WBC: CPT | Performed by: STUDENT IN AN ORGANIZED HEALTH CARE EDUCATION/TRAINING PROGRAM

## 2023-12-10 PROCEDURE — 84484 ASSAY OF TROPONIN QUANT: CPT | Performed by: STUDENT IN AN ORGANIZED HEALTH CARE EDUCATION/TRAINING PROGRAM

## 2023-12-10 PROCEDURE — 36415 COLL VENOUS BLD VENIPUNCTURE: CPT | Performed by: STUDENT IN AN ORGANIZED HEALTH CARE EDUCATION/TRAINING PROGRAM

## 2023-12-10 PROCEDURE — 99285 EMERGENCY DEPT VISIT HI MDM: CPT | Performed by: STUDENT IN AN ORGANIZED HEALTH CARE EDUCATION/TRAINING PROGRAM

## 2023-12-10 PROCEDURE — 96366 THER/PROPH/DIAG IV INF ADDON: CPT | Performed by: FAMILY MEDICINE

## 2023-12-10 PROCEDURE — 96365 THER/PROPH/DIAG IV INF INIT: CPT | Performed by: FAMILY MEDICINE

## 2023-12-10 PROCEDURE — 83735 ASSAY OF MAGNESIUM: CPT | Performed by: STUDENT IN AN ORGANIZED HEALTH CARE EDUCATION/TRAINING PROGRAM

## 2023-12-10 PROCEDURE — 83880 ASSAY OF NATRIURETIC PEPTIDE: CPT | Performed by: STUDENT IN AN ORGANIZED HEALTH CARE EDUCATION/TRAINING PROGRAM

## 2023-12-10 PROCEDURE — 96361 HYDRATE IV INFUSION ADD-ON: CPT | Performed by: FAMILY MEDICINE

## 2023-12-10 PROCEDURE — 96375 TX/PRO/DX INJ NEW DRUG ADDON: CPT | Performed by: FAMILY MEDICINE

## 2023-12-10 PROCEDURE — 87636 SARSCOV2 & INF A&B AMP PRB: CPT | Performed by: STUDENT IN AN ORGANIZED HEALTH CARE EDUCATION/TRAINING PROGRAM

## 2023-12-10 PROCEDURE — 82805 BLOOD GASES W/O2 SATURATION: CPT | Performed by: STUDENT IN AN ORGANIZED HEALTH CARE EDUCATION/TRAINING PROGRAM

## 2023-12-10 PROCEDURE — 80053 COMPREHEN METABOLIC PANEL: CPT | Performed by: STUDENT IN AN ORGANIZED HEALTH CARE EDUCATION/TRAINING PROGRAM

## 2023-12-10 PROCEDURE — 2500000004 HC RX 250 GENERAL PHARMACY W/ HCPCS (ALT 636 FOR OP/ED): Performed by: STUDENT IN AN ORGANIZED HEALTH CARE EDUCATION/TRAINING PROGRAM

## 2023-12-10 PROCEDURE — G0378 HOSPITAL OBSERVATION PER HR: HCPCS

## 2023-12-10 PROCEDURE — 82040 ASSAY OF SERUM ALBUMIN: CPT | Performed by: STUDENT IN AN ORGANIZED HEALTH CARE EDUCATION/TRAINING PROGRAM

## 2023-12-10 PROCEDURE — 87086 URINE CULTURE/COLONY COUNT: CPT | Mod: ELYLAB | Performed by: STUDENT IN AN ORGANIZED HEALTH CARE EDUCATION/TRAINING PROGRAM

## 2023-12-10 PROCEDURE — 81001 URINALYSIS AUTO W/SCOPE: CPT | Performed by: STUDENT IN AN ORGANIZED HEALTH CARE EDUCATION/TRAINING PROGRAM

## 2023-12-10 PROCEDURE — 2500000001 HC RX 250 WO HCPCS SELF ADMINISTERED DRUGS (ALT 637 FOR MEDICARE OP): Performed by: STUDENT IN AN ORGANIZED HEALTH CARE EDUCATION/TRAINING PROGRAM

## 2023-12-10 PROCEDURE — 93005 ELECTROCARDIOGRAM TRACING: CPT

## 2023-12-10 PROCEDURE — 71046 X-RAY EXAM CHEST 2 VIEWS: CPT | Mod: FY,FR

## 2023-12-10 RX ORDER — NICOTINE POLACRILEX 2 MG
1 GUM BUCCAL DAILY
COMMUNITY

## 2023-12-10 RX ORDER — ONDANSETRON HYDROCHLORIDE 2 MG/ML
4 INJECTION, SOLUTION INTRAVENOUS EVERY 8 HOURS PRN
Status: DISCONTINUED | OUTPATIENT
Start: 2023-12-10 | End: 2023-12-11

## 2023-12-10 RX ORDER — DOFETILIDE 0.25 MG/1
250 CAPSULE ORAL 2 TIMES DAILY
COMMUNITY
Start: 2023-05-17

## 2023-12-10 RX ORDER — SPIRONOLACTONE 25 MG/1
25 TABLET ORAL DAILY
COMMUNITY
Start: 2023-08-18

## 2023-12-10 RX ORDER — CITALOPRAM 40 MG/1
40 TABLET, FILM COATED ORAL DAILY
Status: ON HOLD | COMMUNITY
End: 2023-12-10 | Stop reason: SDUPTHER

## 2023-12-10 RX ORDER — TRAMADOL HYDROCHLORIDE 50 MG/1
100 TABLET ORAL EVERY 8 HOURS PRN
COMMUNITY
Start: 2022-08-09

## 2023-12-10 RX ORDER — ACETAMINOPHEN 325 MG/1
650 TABLET ORAL EVERY 4 HOURS PRN
Status: DISCONTINUED | OUTPATIENT
Start: 2023-12-10 | End: 2023-12-11 | Stop reason: HOSPADM

## 2023-12-10 RX ORDER — MAGNESIUM SULFATE HEPTAHYDRATE 40 MG/ML
2 INJECTION, SOLUTION INTRAVENOUS ONCE
Status: COMPLETED | OUTPATIENT
Start: 2023-12-10 | End: 2023-12-10

## 2023-12-10 RX ORDER — PREGABALIN 100 MG/1
100 CAPSULE ORAL DAILY
COMMUNITY
Start: 2023-03-16

## 2023-12-10 RX ORDER — DOCUSATE SODIUM 100 MG/1
100 CAPSULE, LIQUID FILLED ORAL 2 TIMES DAILY PRN
COMMUNITY

## 2023-12-10 RX ORDER — MULTIVITAMIN
1 TABLET ORAL DAILY
COMMUNITY
Start: 2003-01-23

## 2023-12-10 RX ORDER — PANTOPRAZOLE SODIUM 40 MG/1
40 TABLET, DELAYED RELEASE ORAL DAILY
Status: DISCONTINUED | OUTPATIENT
Start: 2023-12-11 | End: 2023-12-11

## 2023-12-10 RX ORDER — CITALOPRAM 40 MG/1
40 TABLET, FILM COATED ORAL DAILY
COMMUNITY

## 2023-12-10 RX ORDER — CHOLECALCIFEROL (VITAMIN D3) 25 MCG
1000 TABLET ORAL DAILY
COMMUNITY

## 2023-12-10 RX ORDER — PANTOPRAZOLE SODIUM 40 MG/1
40 TABLET, DELAYED RELEASE ORAL
COMMUNITY
Start: 2020-08-26

## 2023-12-10 RX ORDER — PANTOPRAZOLE SODIUM 40 MG/10ML
40 INJECTION, POWDER, LYOPHILIZED, FOR SOLUTION INTRAVENOUS DAILY
Status: DISCONTINUED | OUTPATIENT
Start: 2023-12-11 | End: 2023-12-11

## 2023-12-10 RX ORDER — ACETAMINOPHEN 160 MG/5ML
650 SOLUTION ORAL EVERY 4 HOURS PRN
Status: DISCONTINUED | OUTPATIENT
Start: 2023-12-10 | End: 2023-12-11 | Stop reason: HOSPADM

## 2023-12-10 RX ORDER — ONDANSETRON 4 MG/1
4 TABLET, FILM COATED ORAL EVERY 8 HOURS PRN
Status: DISCONTINUED | OUTPATIENT
Start: 2023-12-10 | End: 2023-12-11

## 2023-12-10 RX ORDER — ACETAMINOPHEN 650 MG/1
650 SUPPOSITORY RECTAL EVERY 4 HOURS PRN
Status: DISCONTINUED | OUTPATIENT
Start: 2023-12-10 | End: 2023-12-11 | Stop reason: HOSPADM

## 2023-12-10 RX ORDER — CYCLOBENZAPRINE HCL 10 MG
10 TABLET ORAL 2 TIMES DAILY PRN
COMMUNITY
Start: 2020-09-23

## 2023-12-10 RX ORDER — TRAMADOL HYDROCHLORIDE 50 MG/1
50 TABLET ORAL ONCE
Status: COMPLETED | OUTPATIENT
Start: 2023-12-10 | End: 2023-12-10

## 2023-12-10 RX ORDER — KETOROLAC TROMETHAMINE 30 MG/ML
15 INJECTION, SOLUTION INTRAMUSCULAR; INTRAVENOUS ONCE
Status: COMPLETED | OUTPATIENT
Start: 2023-12-10 | End: 2023-12-10

## 2023-12-10 RX ORDER — ACEBUTOLOL HYDROCHLORIDE 200 MG/1
200 CAPSULE ORAL 2 TIMES DAILY
COMMUNITY
Start: 2020-10-14

## 2023-12-10 RX ORDER — TALC
3 POWDER (GRAM) TOPICAL DAILY
Status: DISCONTINUED | OUTPATIENT
Start: 2023-12-10 | End: 2023-12-11 | Stop reason: HOSPADM

## 2023-12-10 RX ADMIN — KETOROLAC TROMETHAMINE 15 MG: 30 INJECTION, SOLUTION INTRAMUSCULAR at 16:25

## 2023-12-10 RX ADMIN — TRAMADOL HYDROCHLORIDE 50 MG: 50 TABLET ORAL at 19:33

## 2023-12-10 RX ADMIN — ACETAMINOPHEN 650 MG: 325 TABLET ORAL at 21:53

## 2023-12-10 RX ADMIN — MAGNESIUM SULFATE HEPTAHYDRATE 2 G: 40 INJECTION, SOLUTION INTRAVENOUS at 17:41

## 2023-12-10 RX ADMIN — SODIUM CHLORIDE 1000 ML: 9 INJECTION, SOLUTION INTRAVENOUS at 16:26

## 2023-12-10 ASSESSMENT — PAIN - FUNCTIONAL ASSESSMENT
PAIN_FUNCTIONAL_ASSESSMENT: 0-10
PAIN_FUNCTIONAL_ASSESSMENT: 0-10

## 2023-12-10 ASSESSMENT — LIFESTYLE VARIABLES
HAVE YOU EVER FELT YOU SHOULD CUT DOWN ON YOUR DRINKING: NO
REASON UNABLE TO ASSESS: NO
EVER FELT BAD OR GUILTY ABOUT YOUR DRINKING: NO
HAVE PEOPLE ANNOYED YOU BY CRITICIZING YOUR DRINKING: NO
EVER HAD A DRINK FIRST THING IN THE MORNING TO STEADY YOUR NERVES TO GET RID OF A HANGOVER: NO

## 2023-12-10 ASSESSMENT — PAIN SCALES - GENERAL
PAINLEVEL_OUTOF10: 0 - NO PAIN
PAINLEVEL_OUTOF10: 7
PAINLEVEL_OUTOF10: 7
PAINLEVEL_OUTOF10: 0 - NO PAIN

## 2023-12-10 ASSESSMENT — PAIN DESCRIPTION - PAIN TYPE: TYPE: ACUTE PAIN

## 2023-12-10 ASSESSMENT — COLUMBIA-SUICIDE SEVERITY RATING SCALE - C-SSRS
2. HAVE YOU ACTUALLY HAD ANY THOUGHTS OF KILLING YOURSELF?: NO
6. HAVE YOU EVER DONE ANYTHING, STARTED TO DO ANYTHING, OR PREPARED TO DO ANYTHING TO END YOUR LIFE?: NO
1. IN THE PAST MONTH, HAVE YOU WISHED YOU WERE DEAD OR WISHED YOU COULD GO TO SLEEP AND NOT WAKE UP?: NO

## 2023-12-10 ASSESSMENT — PAIN DESCRIPTION - PROGRESSION: CLINICAL_PROGRESSION: NOT CHANGED

## 2023-12-10 NOTE — ED PROVIDER NOTES
HPI   Chief Complaint   Patient presents with    Shortness of Breath       Patient is a 68-year-old female presenting to the emergency department with complaints of general weakness.  Patient states that she went to urgent care yesterday because she was having some general weakness and some upper respiratory symptoms she was tested for COVID and flu which was negative.  Patient states that she took a home COVID test and that was positive.  She states that she has been having worsening shortness of breath and weakness as well as body aches and sore throat so she came to the emergency department today for evaluation.  Patient is concerned with her having COVID because she states that her daughter  from COVID infection 2 years ago.  Patient denies any chest pain, productive cough, abdominal pain, nausea/vomiting, changes bowel or bladder habits, syncopal episodes, falls or traumatic injuries.      History provided by:  Patient   used: No                        Kacie Coma Scale Score: 15                  Patient History   Past Medical History:   Diagnosis Date    Personal history of other diseases of the circulatory system 2021    History of atrial fibrillation    Personal history of other diseases of the musculoskeletal system and connective tissue 2021    History of degenerative disc disease    Personal history of other diseases of the respiratory system 2021    History of bronchitis    Personal history of other endocrine, nutritional and metabolic disease     History of hypercholesterolemia     Past Surgical History:   Procedure Laterality Date    APPENDECTOMY  2017    Appendectomy    CERVICAL FUSION  2017    Cervical Vertebral Fusion    CHOLECYSTECTOMY  2017    Cholecystectomy    COLON SURGERY  2021    Colon Surgery    LUMBAR LAMINECTOMY  2017    Laminectomy Lumbar    OTHER SURGICAL HISTORY  2017    Spinal Stereotaxis Stimulation Of Cord      No family history on file.  Social History     Tobacco Use    Smoking status: Not on file    Smokeless tobacco: Not on file   Substance Use Topics    Alcohol use: Not on file    Drug use: Not on file       Physical Exam   ED Triage Vitals   Temp Pulse Resp BP   -- -- -- --      SpO2 Temp src Heart Rate Source Patient Position   -- -- -- --      BP Location FiO2 (%)     -- --       Physical Exam  Vitals and nursing note reviewed.   Constitutional:       General: She is not in acute distress.     Appearance: Normal appearance. She is not ill-appearing, toxic-appearing or diaphoretic.   HENT:      Head: Normocephalic and atraumatic.      Nose: Nose normal.      Mouth/Throat:      Mouth: Mucous membranes are moist.      Pharynx: No oropharyngeal exudate or posterior oropharyngeal erythema.   Eyes:      General: No scleral icterus.     Extraocular Movements: Extraocular movements intact.      Pupils: Pupils are equal, round, and reactive to light.   Cardiovascular:      Rate and Rhythm: Normal rate and regular rhythm.      Pulses: Normal pulses.      Heart sounds: Normal heart sounds. No murmur heard.     No friction rub. No gallop.   Pulmonary:      Effort: Pulmonary effort is normal. No respiratory distress.      Breath sounds: Normal breath sounds. No stridor. No wheezing, rhonchi or rales.   Chest:      Chest wall: No tenderness.   Abdominal:      General: Abdomen is flat. There is no distension.      Palpations: Abdomen is soft. There is no mass.      Tenderness: There is no abdominal tenderness. There is no guarding.      Hernia: No hernia is present.   Musculoskeletal:         General: No swelling, tenderness, deformity or signs of injury. Normal range of motion.      Cervical back: Normal range of motion and neck supple. No rigidity.   Skin:     General: Skin is warm and dry.      Capillary Refill: Capillary refill takes less than 2 seconds.      Coloration: Skin is not jaundiced or pale.      Findings: No  bruising, erythema, lesion or rash.   Neurological:      General: No focal deficit present.      Mental Status: She is alert and oriented to person, place, and time. Mental status is at baseline.   Psychiatric:         Mood and Affect: Mood normal.         Behavior: Behavior normal.         ED Course & MDM   Diagnoses as of 12/10/23 1900   COVID   Weakness   Hypomagnesemia       Medical Decision Making  Patient is a 68-year-old female presenting to the emergency department with complaints of general weakness.  Patient states she tested positive for COVID at home but had a negative COVID and influenza test at urgent care.  Patient endorsing worsening shortness of breath.  Patient is not having any accessory muscle usage or hypoxia noted on exam.  Patient has no concerning triage vital sign findings.  With the worsening shortness of breath will investigate for underlying etiology outside of COVID with cardiac evaluation as well as obtain a VBG.  Patient will be given Toradol for diffuse body aches as well as IV fluids.    CBC reviewed with baseline anemia noted.  No leukocytosis or thrombocytopenia.  Metabolic panel reviewed with slight elevation of her creatinine but otherwise unremarkable.  Urinalysis with some occasional hyaline casts concerning for possible dehydration IV fluids were given.  EKG showed no acute ischemic injury changes.  Troponin was negative x 2.  Patient had some hypomagnesemia and replacement was ordered.  Patient did test positive for COVID here but was negative for influenza.  BNP was normal and chest x-ray showed no acute cardiopulmonary processes.  Will get the patient up and ambulate her and assess for hypoxia with exertion.  If the patient is hypoxic on exertion she will require admission however if the patient is able to ambulate without difficulty and is not hypoxic she feels comfortable going home and following up with her primary care provider.    Amount and/or Complexity of Data  Reviewed  Labs: ordered. Decision-making details documented in ED Course.  Radiology: ordered. Decision-making details documented in ED Course.  ECG/medicine tests: independent interpretation performed.     Details: Normal sinus rhythm with a ventricular rate of 75 bpm.  QRS interval 94 ms.  QTc 442 ms.  No acute ischemic injury pattern noted.      Labs Reviewed   CBC WITH AUTO DIFFERENTIAL - Abnormal       Result Value    WBC 4.6      nRBC 0.0      RBC 3.96 (*)     Hemoglobin 9.7 (*)     Hematocrit 31.1 (*)     MCV 79 (*)     MCH 24.5 (*)     MCHC 31.2 (*)     RDW 16.4 (*)     Platelets 241      Neutrophils % 72.2      Immature Granulocytes %, Automated 0.2      Lymphocytes % 17.0      Monocytes % 6.5      Eosinophils % 3.7      Basophils % 0.4      Neutrophils Absolute 3.32      Immature Granulocytes Absolute, Automated 0.01      Lymphocytes Absolute 0.78 (*)     Monocytes Absolute 0.30      Eosinophils Absolute 0.17      Basophils Absolute 0.02     COMPREHENSIVE METABOLIC PANEL - Abnormal    Glucose 102 (*)     Sodium 139      Potassium 3.6      Chloride 106      Bicarbonate 23      Anion Gap 14      Urea Nitrogen 10      Creatinine 1.07 (*)     eGFR 56 (*)     Calcium 8.8      Albumin 3.8      Alkaline Phosphatase 85      Total Protein 6.4      AST 55 (*)     Bilirubin, Total 0.4      ALT 35     MAGNESIUM - Abnormal    Magnesium 1.54 (*)    SARS-COV-2 AND INFLUENZA A/B PCR - Abnormal    Flu A Result Not Detected      Flu B Result Not Detected      Coronavirus 2019, PCR Detected (*)     Narrative:     This assay has received FDA Emergency Use Authorization (EUA) and  is only authorized for the duration of time that circumstances exist to justify the authorization of the emergency use of in vitro diagnostic tests for the detection of SARS-CoV-2 virus and/or diagnosis of COVID-19 infection under section 564(b)(1) of the Act, 21 U.S.C. 360bbb-3(b)(1). Testing for SARS-CoV-2 is only recommended for patients who meet  current clinical and/or epidemiological criteria as defined by federal, state, or local public health directives. This assay is an in vitro diagnostic nucleic acid amplification test for the qualitative detection of SARS-CoV-2, Influenza A, and Influenza B from nasopharyngeal specimens and has been validated for use at [facility]. Negative results do not preclude COVID-19 infections or Influenza A/B infections, and should not be used as the sole basis for diagnosis, treatment, or other management decisions. If Influenza A/B and RSV PCR results are negative, testing for Parainfluenza virus, Adenovirus and Metapneumovirus is routinely performed for [facility] pediatric oncology and intensive care inpatients, and is available on other patients by placing an add-on request.    BLOOD GAS VENOUS - Abnormal    POCT pH, Venous 7.28 (*)     POCT pCO2, Venous 40 (*)     POCT pO2, Venous 61 (*)     POCT SO2, Venous 90 (*)     POCT Oxy Hemoglobin, Venous 87.3 (*)     POCT Base Excess, Venous -7.5 (*)     POCT HCO3 Calculated, Venous 18.8 (*)     Patient Temperature        FiO2 21      Test Comment [id number]     URINALYSIS WITH REFLEX MICROSCOPIC AND CULTURE - Abnormal    Color, Urine Yellow      Appearance, Urine Clear      Specific Gravity, Urine 1.024      pH, Urine 7.0      Protein, Urine 30 (1+) (*)     Glucose, Urine NEGATIVE      Blood, Urine NEGATIVE      Ketones, Urine 5 (TRACE) (*)     Bilirubin, Urine NEGATIVE      Urobilinogen, Urine 4.0 (*)     Nitrite, Urine NEGATIVE      Leukocyte Esterase, Urine SMALL (1+) (*)    MICROSCOPIC ONLY, URINE - Abnormal    WBC, Urine 6-10 (*)     RBC, Urine 6-10 (*)     Hyaline Casts, Urine OCCASIONAL (*)    B-TYPE NATRIURETIC PEPTIDE - Normal    BNP 78      Narrative:        <100 pg/mL - Heart failure unlikely  100-299 pg/mL - Intermediate probability of acute heart                  failure exacerbation. Correlate with clinical                  context and patient  history.    >=300 pg/mL - Heart Failure likely. Correlate with clinical                  context and patient history.    BNP testing is performed using different testing methodology at [facility] than at other [facility]. Direct result comparisons should only be made within the same method.      SERIAL TROPONIN-INITIAL - Normal    Troponin I, High Sensitivity 8      Narrative:     Less than 99th percentile of normal range cutoff-  Female and children under 18 years old <14 ng/L; Male <21 ng/L: Negative  Repeat testing should be performed if clinically indicated.     Female and children under 18 years old 14-50 ng/L; Male 21-50 ng/L:  Consistent with possible cardiac damage and possible increased clinical   risk. Serial measurements may help to assess extent of myocardial damage.     >50 ng/L: Consistent with cardiac damage, increased clinical risk and  myocardial infarction. Serial measurements may help assess extent of   myocardial damage.      NOTE: Children less than 1 year old may have higher baseline troponin   levels and results should be interpreted in conjunction with the overall   clinical context.     NOTE: Troponin I testing is performed using a different   testing methodology at [facility] than at other   [facility]. Direct result comparisons should only   be made within the same method.   SERIAL TROPONIN, 1 HOUR - Normal    Troponin I, High Sensitivity 8      Narrative:     Less than 99th percentile of normal range cutoff-  Female and children under 18 years old <14 ng/L; Male <21 ng/L: Negative  Repeat testing should be performed if clinically indicated.     Female and children under 18 years old 14-50 ng/L; Male 21-50 ng/L:  Consistent with possible cardiac damage and possible increased clinical   risk. Serial measurements may help to assess extent of myocardial damage.     >50 ng/L: Consistent with cardiac damage, increased clinical risk and  myocardial  infarction. Serial measurements may help assess extent of   myocardial damage.      NOTE: Children less than 1 year old may have higher baseline troponin   levels and results should be interpreted in conjunction with the overall   clinical context.     NOTE: Troponin I testing is performed using a different   testing methodology at [facility] than at other   [facility]. Direct result comparisons should only   be made within the same method.   URINE CULTURE   TROPONIN SERIES- (INITIAL, 1 HR)    Narrative:     The following orders were created for panel order Troponin I Series, High Sensitivity (0, 1 HR).  Procedure                               Abnormality         Status                     ---------                               -----------         ------                     Troponin I, High Sensiti...[[id number]]  Normal              Final result               Troponin, High Sensitivi...[[id number]]  Normal              Final result                 Please view results for these tests on the individual orders.   URINALYSIS WITH REFLEX MICROSCOPIC AND CULTURE    Narrative:     The following orders were created for panel order Urinalysis with Reflex Microscopic and Culture.  Procedure                               Abnormality         Status                     ---------                               -----------         ------                     Urinalysis with Reflex M...[[id number]]  Abnormal            Final result               Extra Urine Gray Tube[[id number]]                            Final result                 Please view results for these tests on the individual orders.   EXTRA URINE GRAY TUBE    Extra Tube Hold for add-ons.       XR chest 2 views   Final Result   No acute process.   Signed by Henry Plasencia MD            Procedure  Procedures     Baljinder Fish DO  12/10/23 [id number]

## 2023-12-10 NOTE — DISCHARGE INSTRUCTIONS
Please follow up with your Primary Care Provider (PCP) within the next 2-3 days regarding your ED visit.  Seek immediate medical attention if you develop: worsening chest pain, new chest pain, nausea, vomiting, weakness, numbness, tingling, excessive sweating, shortness of breath, difficulty breathing, loss of motion in your arms or legs, or any new or worsening symptoms.  These documents have a lot of useful information! Please read them, so you know what to expect, what you can do for yourself at home, and also to know concerning signs warrant a return to the Emergency Department for additional evaluation.  You are welcome back any time. Thank you for entrusting your care to us, I hope we made your visit as pleasant as possible. Wishing you well!    Dr. Fish

## 2023-12-11 VITALS
SYSTOLIC BLOOD PRESSURE: 140 MMHG | WEIGHT: 193 LBS | BODY MASS INDEX: 32.15 KG/M2 | RESPIRATION RATE: 18 BRPM | OXYGEN SATURATION: 93 % | DIASTOLIC BLOOD PRESSURE: 77 MMHG | TEMPERATURE: 97.2 F | HEART RATE: 64 BPM | HEIGHT: 65 IN

## 2023-12-11 PROCEDURE — 99238 HOSP IP/OBS DSCHRG MGMT 30/<: CPT | Performed by: INTERNAL MEDICINE

## 2023-12-11 PROCEDURE — 2500000001 HC RX 250 WO HCPCS SELF ADMINISTERED DRUGS (ALT 637 FOR MEDICARE OP): Performed by: NURSE PRACTITIONER

## 2023-12-11 PROCEDURE — 2500000001 HC RX 250 WO HCPCS SELF ADMINISTERED DRUGS (ALT 637 FOR MEDICARE OP): Performed by: INTERNAL MEDICINE

## 2023-12-11 PROCEDURE — G0378 HOSPITAL OBSERVATION PER HR: HCPCS

## 2023-12-11 PROCEDURE — 2500000004 HC RX 250 GENERAL PHARMACY W/ HCPCS (ALT 636 FOR OP/ED): Performed by: NURSE PRACTITIONER

## 2023-12-11 RX ORDER — DOFETILIDE 0.25 MG/1
250 CAPSULE ORAL DAILY
Status: DISCONTINUED | OUTPATIENT
Start: 2023-12-11 | End: 2023-12-11 | Stop reason: HOSPADM

## 2023-12-11 RX ORDER — TRAMADOL HYDROCHLORIDE 50 MG/1
100 TABLET ORAL EVERY 8 HOURS PRN
Status: DISCONTINUED | OUTPATIENT
Start: 2023-12-11 | End: 2023-12-11 | Stop reason: HOSPADM

## 2023-12-11 RX ORDER — CITALOPRAM 20 MG/1
40 TABLET, FILM COATED ORAL DAILY
Status: DISCONTINUED | OUTPATIENT
Start: 2023-12-11 | End: 2023-12-11 | Stop reason: HOSPADM

## 2023-12-11 RX ORDER — PANTOPRAZOLE SODIUM 40 MG/1
40 TABLET, DELAYED RELEASE ORAL
Status: DISCONTINUED | OUTPATIENT
Start: 2023-12-11 | End: 2023-12-11 | Stop reason: HOSPADM

## 2023-12-11 RX ORDER — MULTIVIT-MIN/IRON FUM/FOLIC AC 7.5 MG-4
1 TABLET ORAL DAILY
Status: DISCONTINUED | OUTPATIENT
Start: 2023-12-11 | End: 2023-12-11 | Stop reason: HOSPADM

## 2023-12-11 RX ORDER — LANOLIN ALCOHOL/MO/W.PET/CERES
400 CREAM (GRAM) TOPICAL DAILY
Status: DISCONTINUED | OUTPATIENT
Start: 2023-12-11 | End: 2023-12-11 | Stop reason: HOSPADM

## 2023-12-11 RX ORDER — HYDROXYZINE HYDROCHLORIDE 25 MG/1
25 TABLET, FILM COATED ORAL EVERY 8 HOURS PRN
Status: DISCONTINUED | OUTPATIENT
Start: 2023-12-11 | End: 2023-12-11 | Stop reason: HOSPADM

## 2023-12-11 RX ADMIN — DOFETILIDE 250 MCG: 0.25 CAPSULE ORAL at 08:39

## 2023-12-11 RX ADMIN — MAGNESIUM OXIDE 400 MG (241.3 MG MAGNESIUM) TABLET 400 MG: TABLET at 08:40

## 2023-12-11 RX ADMIN — CITALOPRAM HYDROBROMIDE 40 MG: 20 TABLET ORAL at 13:06

## 2023-12-11 RX ADMIN — ACETAMINOPHEN 650 MG: 325 TABLET ORAL at 12:21

## 2023-12-11 RX ADMIN — PANTOPRAZOLE SODIUM 40 MG: 40 TABLET, DELAYED RELEASE ORAL at 06:53

## 2023-12-11 RX ADMIN — TRAMADOL HYDROCHLORIDE 100 MG: 50 TABLET, COATED ORAL at 13:06

## 2023-12-11 RX ADMIN — APIXABAN 5 MG: 5 TABLET, FILM COATED ORAL at 08:40

## 2023-12-11 RX ADMIN — MULTIPLE VITAMINS W/ MINERALS TAB 1 TABLET: TAB at 08:40

## 2023-12-11 SDOH — SOCIAL STABILITY: SOCIAL INSECURITY: HAVE YOU HAD THOUGHTS OF HARMING ANYONE ELSE?: NO

## 2023-12-11 SDOH — SOCIAL STABILITY: SOCIAL INSECURITY: DO YOU FEEL ANYONE HAS EXPLOITED OR TAKEN ADVANTAGE OF YOU FINANCIALLY OR OF YOUR PERSONAL PROPERTY?: NO

## 2023-12-11 SDOH — SOCIAL STABILITY: SOCIAL INSECURITY: ARE YOU OR HAVE YOU BEEN THREATENED OR ABUSED PHYSICALLY, EMOTIONALLY, OR SEXUALLY BY ANYONE?: NO

## 2023-12-11 SDOH — SOCIAL STABILITY: SOCIAL INSECURITY: DOES ANYONE TRY TO KEEP YOU FROM HAVING/CONTACTING OTHER FRIENDS OR DOING THINGS OUTSIDE YOUR HOME?: NO

## 2023-12-11 SDOH — SOCIAL STABILITY: SOCIAL INSECURITY: WERE YOU ABLE TO COMPLETE ALL THE BEHAVIORAL HEALTH SCREENINGS?: YES

## 2023-12-11 SDOH — SOCIAL STABILITY: SOCIAL INSECURITY: HAS ANYONE EVER THREATENED TO HURT YOUR FAMILY OR YOUR PETS?: NO

## 2023-12-11 SDOH — SOCIAL STABILITY: SOCIAL INSECURITY: DO YOU FEEL UNSAFE GOING BACK TO THE PLACE WHERE YOU ARE LIVING?: NO

## 2023-12-11 SDOH — SOCIAL STABILITY: SOCIAL INSECURITY: ABUSE: ADULT

## 2023-12-11 SDOH — SOCIAL STABILITY: SOCIAL INSECURITY: ARE THERE ANY APPARENT SIGNS OF INJURIES/BEHAVIORS THAT COULD BE RELATED TO ABUSE/NEGLECT?: NO

## 2023-12-11 ASSESSMENT — ACTIVITIES OF DAILY LIVING (ADL)
GROOMING: INDEPENDENT
WALKS IN HOME: INDEPENDENT
BATHING: INDEPENDENT
DRESSING YOURSELF: INDEPENDENT
PATIENT'S MEMORY ADEQUATE TO SAFELY COMPLETE DAILY ACTIVITIES?: YES
LACK_OF_TRANSPORTATION: NO
HEARING - RIGHT EAR: FUNCTIONAL
TOILETING: INDEPENDENT
FEEDING YOURSELF: INDEPENDENT
ADEQUATE_TO_COMPLETE_ADL: YES
LACK_OF_TRANSPORTATION: NO
JUDGMENT_ADEQUATE_SAFELY_COMPLETE_DAILY_ACTIVITIES: YES
HEARING - LEFT EAR: FUNCTIONAL

## 2023-12-11 ASSESSMENT — COGNITIVE AND FUNCTIONAL STATUS - GENERAL
MOBILITY SCORE: 24
DAILY ACTIVITIY SCORE: 24
DAILY ACTIVITIY SCORE: 24
MOBILITY SCORE: 24
PATIENT BASELINE BEDBOUND: NO

## 2023-12-11 ASSESSMENT — PATIENT HEALTH QUESTIONNAIRE - PHQ9
2. FEELING DOWN, DEPRESSED OR HOPELESS: NOT AT ALL
SUM OF ALL RESPONSES TO PHQ9 QUESTIONS 1 & 2: 0
1. LITTLE INTEREST OR PLEASURE IN DOING THINGS: NOT AT ALL

## 2023-12-11 ASSESSMENT — PAIN - FUNCTIONAL ASSESSMENT
PAIN_FUNCTIONAL_ASSESSMENT: 0-10
PAIN_FUNCTIONAL_ASSESSMENT: 0-10

## 2023-12-11 ASSESSMENT — LIFESTYLE VARIABLES
HOW MANY STANDARD DRINKS CONTAINING ALCOHOL DO YOU HAVE ON A TYPICAL DAY: PATIENT DOES NOT DRINK
HOW OFTEN DO YOU HAVE A DRINK CONTAINING ALCOHOL: NEVER
HOW OFTEN DO YOU HAVE 6 OR MORE DRINKS ON ONE OCCASION: NEVER
AUDIT-C TOTAL SCORE: 0
AUDIT-C TOTAL SCORE: 0
SKIP TO QUESTIONS 9-10: 1

## 2023-12-11 ASSESSMENT — PAIN SCALES - GENERAL
PAINLEVEL_OUTOF10: 0 - NO PAIN
PAINLEVEL_OUTOF10: 7

## 2023-12-11 ASSESSMENT — PAIN DESCRIPTION - LOCATION: LOCATION: BACK

## 2023-12-11 NOTE — H&P
Medical Group History and Physical    ASSESSMENT & PLAN:       Anxiety  COVID-positive  Isolation precautions, COVID symptoms began approximately 4 to 5 days ago, family  from COVID infection patient has increased anxiety secondary to her current COVID infection diagnosis, tolerating oral intake, no urinary symptoms although UA does appear to have bacterial growth.  Most likely secondary to dehydration which is also minimal.    - EKG is stable  - Respiratory supportive care  - Monitor overnight plan for discharge in a.m.  - Resume home meds when rec is completed    VTE Prophylaxis: Lovenox    RICK Arias-CNP    HISTORY OF PRESENT ILLNESS:   Chief Complaint: fatigue, generally unwell    History Of Present Illness:    Lanie Pablo is a 68 y.o. female with a significant past medical history of Anxiety presenting to [place] emergency department with complaints of general illness, fatigue x 4 to 5 days.  COVID swab is positive patient reports high anxiety as it relates to going home, family members have passed with COVID infection increasing patient's anxiety surrounding diagnosis.  When ambulating SpO2 is approximately 91 to 92%, not hypoxic while at rest not requiring supplemental O2 at this time.  No evidence of shortness of breath, continue with supportive care.  UA is positive for bacterial growth, most likely secondary to dehydration which is also minimal.  EKG is stable.    Patient is hemodynamically stable and ready for hospital admission under internal medicine service for COVID infection.    Review of systems: 10 point review of systems is otherwise negative except as mentioned above.    PAST HISTORIES:       Past Medical History:  Medical Problems       Problem List       * (Principal) COVID           Past Surgical History:  Past Surgical History:   Procedure Laterality Date    APPENDECTOMY  2017    Appendectomy    CERVICAL FUSION  2017    Cervical Vertebral Fusion     "CHOLECYSTECTOMY  08/24/2017    Cholecystectomy    COLON SURGERY  08/13/2021    Colon Surgery    LUMBAR LAMINECTOMY  08/24/2017    Laminectomy Lumbar    OTHER SURGICAL HISTORY  08/24/2017    Spinal Stereotaxis Stimulation Of Cord          Social History:  She has no history on file for tobacco use, alcohol use, and drug use.    Family History:  No family history on file.     Allergies:  Advair diskus [fluticasone propion-salmeterol] and Sulfa (sulfonamide antibiotics)    OBJECTIVE:       Last Recorded Vitals:  Vitals:    12/10/23 1602 12/10/23 1756 12/10/23 1934   BP: 122/71 121/68 131/65   BP Location: Right arm Right arm    Patient Position: Lying Lying    Pulse: 67 58 63   Resp: 20 18 18   SpO2: 99% 96% 99%   Weight: 87.5 kg (193 lb)     Height: 1.626 m (5' 4\")         Last I/O:  No intake/output data recorded.    Physical Exam  Vitals and nursing note reviewed.   Constitutional:       General: She is awake.      Appearance: Normal appearance.   HENT:      Head: Normocephalic and atraumatic.      Nose: Nose normal.      Mouth/Throat:      Mouth: Mucous membranes are moist.      Pharynx: Oropharynx is clear.   Eyes:      Extraocular Movements: Extraocular movements intact.      Conjunctiva/sclera: Conjunctivae normal.      Pupils: Pupils are equal, round, and reactive to light.   Cardiovascular:      Rate and Rhythm: Normal rate and regular rhythm.      Pulses: Normal pulses.      Heart sounds: Normal heart sounds.   Pulmonary:      Effort: Pulmonary effort is normal.      Breath sounds: Normal breath sounds.   Abdominal:      General: Abdomen is flat. Bowel sounds are normal.      Palpations: Abdomen is soft.   Musculoskeletal:         General: Normal range of motion.      Cervical back: Normal range of motion and neck supple.      Right lower leg: No edema.   Skin:     General: Skin is warm and dry.      Capillary Refill: Capillary refill takes less than 2 seconds.   Neurological:      General: No focal deficit " present.      Mental Status: She is alert and oriented to person, place, and time. Mental status is at baseline.   Psychiatric:         Mood and Affect: Mood normal.         Behavior: Behavior normal. Behavior is cooperative.         Thought Content: Thought content normal.         Judgment: Judgment normal.           Scheduled Medications     PRN Medications  PRN medications: acetaminophen **OR** acetaminophen **OR** acetaminophen  Continuous Medications       Outpatient Medications:  Prior to Admission medications    Not on File       LABS AND IMAGING:     Labs:  Results for orders placed or performed during the hospital encounter of 12/10/23 (from the past 24 hour(s))   CBC and Auto Differential   Result Value Ref Range    WBC 4.6 4.4 - 11.3 x10*3/uL    nRBC 0.0 0.0 - 0.0 /100 WBCs    RBC 3.96 (L) 4.00 - 5.20 x10*6/uL    Hemoglobin 9.7 (L) 12.0 - 16.0 g/dL    Hematocrit 31.1 (L) 36.0 - 46.0 %    MCV 79 (L) 80 - 100 fL    MCH 24.5 (L) 26.0 - 34.0 pg    MCHC 31.2 (L) 32.0 - 36.0 g/dL    RDW 16.4 (H) 11.5 - 14.5 %    Platelets 241 150 - 450 x10*3/uL    Neutrophils % 72.2 40.0 - 80.0 %    Immature Granulocytes %, Automated 0.2 0.0 - 0.9 %    Lymphocytes % 17.0 13.0 - 44.0 %    Monocytes % 6.5 2.0 - 10.0 %    Eosinophils % 3.7 0.0 - 6.0 %    Basophils % 0.4 0.0 - 2.0 %    Neutrophils Absolute 3.32 1.20 - 7.70 x10*3/uL    Immature Granulocytes Absolute, Automated 0.01 0.00 - 0.70 x10*3/uL    Lymphocytes Absolute 0.78 (L) 1.20 - 4.80 x10*3/uL    Monocytes Absolute 0.30 0.10 - 1.00 x10*3/uL    Eosinophils Absolute 0.17 0.00 - 0.70 x10*3/uL    Basophils Absolute 0.02 0.00 - 0.10 x10*3/uL   Comprehensive Metabolic Panel   Result Value Ref Range    Glucose 102 (H) 74 - 99 mg/dL    Sodium 139 136 - 145 mmol/L    Potassium 3.6 3.5 - 5.3 mmol/L    Chloride 106 98 - 107 mmol/L    Bicarbonate 23 21 - 32 mmol/L    Anion Gap 14 10 - 20 mmol/L    Urea Nitrogen 10 6 - 23 mg/dL    Creatinine 1.07 (H) 0.50 - 1.05 mg/dL    eGFR 56 (L)  >60 mL/min/1.73m*2    Calcium 8.8 8.6 - 10.3 mg/dL    Albumin 3.8 3.4 - 5.0 g/dL    Alkaline Phosphatase 85 33 - 136 U/L    Total Protein 6.4 6.4 - 8.2 g/dL    AST 55 (H) 9 - 39 U/L    Bilirubin, Total 0.4 0.0 - 1.2 mg/dL    ALT 35 7 - 45 U/L   Magnesium   Result Value Ref Range    Magnesium 1.54 (L) 1.60 - 2.40 mg/dL   B-Type Natriuretic Peptide   Result Value Ref Range    BNP 78 0 - 99 pg/mL   BLOOD GAS VENOUS   Result Value Ref Range    POCT pH, Venous 7.28 (L) 7.33 - 7.43 pH    POCT pCO2, Venous 40 (L) 41 - 51 mm Hg    POCT pO2, Venous 61 (H) 35 - 45 mm Hg    POCT SO2, Venous 90 (H) 45 - 75 %    POCT Oxy Hemoglobin, Venous 87.3 (H) 45.0 - 75.0 %    POCT Base Excess, Venous -7.5 (L) -2.0 - 3.0 mmol/L    POCT HCO3 Calculated, Venous 18.8 (L) 22.0 - 26.0 mmol/L    Patient Temperature      FiO2 21 %    Test Comment [id number]    Troponin I, High Sensitivity, Initial   Result Value Ref Range    Troponin I, High Sensitivity 8 0 - 13 ng/L   Sars-CoV-2 and Influenza A/B PCR   Result Value Ref Range    Flu A Result Not Detected Not Detected    Flu B Result Not Detected Not Detected    Coronavirus 2019, PCR Detected (A) Not Detected   Urinalysis with Reflex Microscopic and Culture   Result Value Ref Range    Color, Urine Yellow Straw, Yellow    Appearance, Urine Clear Clear    Specific Gravity, Urine 1.024 1.005 - 1.035    pH, Urine 7.0 5.0, 5.5, 6.0, 6.5, 7.0, 7.5, 8.0    Protein, Urine 30 (1+) (N) NEGATIVE mg/dL    Glucose, Urine NEGATIVE NEGATIVE mg/dL    Blood, Urine NEGATIVE NEGATIVE    Ketones, Urine 5 (TRACE) (A) NEGATIVE mg/dL    Bilirubin, Urine NEGATIVE NEGATIVE    Urobilinogen, Urine 4.0 (N) <2.0 mg/dL    Nitrite, Urine NEGATIVE NEGATIVE    Leukocyte Esterase, Urine SMALL (1+) (A) NEGATIVE   Extra Urine Gray Tube   Result Value Ref Range    Extra Tube Hold for add-ons.    Microscopic Only, Urine   Result Value Ref Range    WBC, Urine 6-10 (A) 1-5, NONE /HPF    RBC, Urine 6-10 (A) NONE, 1-2, 3-5 /HPF    Hyaline  Casts, Urine OCCASIONAL (A) NONE /LPF   Troponin, High Sensitivity, 1 Hour   Result Value Ref Range    Troponin I, High Sensitivity 8 0 - 13 ng/L        Imaging:  XR chest 2 views   Final Result   No acute process.   Signed by Henry Plasencia MD

## 2023-12-11 NOTE — ED PROVIDER NOTES
Patient found all comfortable with ambulatory challenge.  She was still maintaining her saturations above 91%.  Got tachycardic with heart rate in the 90s.  However, she was very dizzy and lightheaded with ambulatory challenge.  She feels unsafe at home.  She believes her  is also ill at this time at home and is unable to care for her.  She wants to be admitted for generalized weakness and dehydration.  Discussed this with the hospitalist who is agreeable to admit at this time.  Did have a mild bump in her baseline creatinine.  Was given IV fluids.  Hypomagnesemic that was repleted by the previous provider.  2 sets of cardiac enzymes unremarkable.  No pneumonia noted on the x-ray imaging.  Not oxygen dependent when she is at rest in the bed.     Namita Hoffman MD  12/10/23 5548

## 2023-12-11 NOTE — DISCHARGE SUMMARY
Discharge Diagnosis  COVID    Issues Requiring Follow-Up  Follow-up with PCP in 2 to 3 days.  If shortness of breath worsens return to the hospital.    Discharge Meds     Your medication list        CONTINUE taking these medications        Instructions Last Dose Given Next Dose Due   acebutolol 200 mg capsule  Commonly known as: Sectral           apixaban 5 mg tablet  Commonly known as: Eliquis           biotin 1 mg capsule           cholecalciferol 25 MCG (1000 UT) tablet  Commonly known as: Vitamin D-3           citalopram 40 mg tablet  Commonly known as: CeleXA           Colace 100 mg capsule  Generic drug: docusate sodium           cyclobenzaprine 10 mg tablet  Commonly known as: Flexeril           dofetilide 250 mcg capsule  Commonly known as: Tikosyn           magnesium oxide 200 mg split tablet  Commonly known as: Mag-Ox           multivitamin tablet           pantoprazole 40 mg EC tablet  Commonly known as: ProtoNix           pregabalin 100 mg capsule  Commonly known as: Lyrica           spironolactone 25 mg tablet  Commonly known as: Aldactone           traMADol 50 mg tablet  Commonly known as: Ultram                    Test Results Pending At Discharge  Pending Labs       Order Current Status    Urine Culture In process            Hospital Course   68 y.o. female with a significant past medical history of Anxiety presenting to [place] emergency department with complaints of general illness, fatigue x 4 to 5 days.  COVID swab is positive patient reports high anxiety as it relates to going home.  The patient was given supportive care she continues to remain on room air does not require any oxygen.  Patient does have some anxiety.  The patient was recommended to drink Gatorade and maintain good IV hydration.  Patient was also recommended that if her shortness of breath worsens she should return to the hospital.  The patient was observed all day today and she did not require any oxygen and did not require any  specific medical treatment except for supportive care.  The patient otherwise is stable for discharge and is recommended to follow-up with PCP in 2 to 3 days..    Pertinent Physical Exam At Time of Discharge  Physical Exam  Constitutional:       Appearance: Normal appearance.   HENT:      Head: Normocephalic and atraumatic.      Nose: Nose normal.      Mouth/Throat:      Mouth: Mucous membranes are dry.   Eyes:      Extraocular Movements: Extraocular movements intact.      Conjunctiva/sclera: Conjunctivae normal.   Cardiovascular:      Rate and Rhythm: Normal rate and regular rhythm.      Pulses: Normal pulses.      Heart sounds: Normal heart sounds.   Pulmonary:      Effort: Pulmonary effort is normal.      Breath sounds: Normal breath sounds.   Abdominal:      General: Bowel sounds are normal.      Palpations: Abdomen is soft.   Musculoskeletal:         General: Normal range of motion.      Cervical back: Normal range of motion and neck supple.   Skin:     General: Skin is warm and dry.   Neurological:      Mental Status: She is alert and oriented to person, place, and time. Mental status is at baseline.   Psychiatric:         Mood and Affect: Mood normal.         Outpatient Follow-Up  No future appointments.      Aubrey Ambrose MD

## 2023-12-11 NOTE — CARE PLAN
The patient's goals for the shift include      The clinical goals for the shift include remain hemodynamically stable    Over the shift, the patient did make progress toward the following goals. Barriers to progression include none. Recommendations to address these barriers include none.

## 2023-12-11 NOTE — CARE PLAN
The patient's goals for the shift include  remain free from injury    The clinical goals for the shift include  remain hemodynamically stable    Over the shift, the patient did  make progress toward the following goals. Barriers to progression include none. Recommendations to address these barriers include none.

## 2023-12-11 NOTE — PROGRESS NOTES
Lanie Pablo is a 68 y.o. female on day 0 of admission presenting with COVID.      Subjective   Patient seen and evaluated today.  She continues to remain on room air.  She does have subjective anxiety and sore throat.  The patient denies any chest pain nausea vomiting abdominal pain or diarrhea.  Discharge was recommended but the patient stated that she is not ready to go home today but she is willing to go home tomorrow.  All questions were answered.       Objective     Last Recorded Vitals  /76   Pulse 70   Temp 36.1 °C (97 °F)   Resp 18   Wt 87.5 kg (193 lb)   SpO2 96%   Intake/Output last 3 Shifts:  No intake or output data in the 24 hours ending 12/11/23 1224    Admission Weight  Weight: 87.5 kg (193 lb) (12/10/23 1602)    Daily Weight  12/10/23 : 87.5 kg (193 lb)    Image Results  XR chest 2 views  Narrative: STUDY:  Chest Radiographs;  12/10/2023 5:36 PM.  INDICATION:  Chest pain and shortness of breath.  COMPARISON:  XR chest 12/14/2021.  ACCESSION NUMBER(S):  [id number]  ORDERING CLINICIAN:  KINGSLEY PHOENIX  TECHNIQUE:  Frontal and lateral chest (three images total).   FINDINGS:  CARDIOMEDIASTINAL SILHOUETTE:  Cardiomediastinal silhouette is normal in size and configuration.     LUNGS:  Lungs are clear.     ABDOMEN:  No remarkable upper abdominal findings.     BONES:  No acute osseous changes.  Impression: No acute process.  Signed by Henry Plasencia MD      Physical Exam  Constitutional:       Appearance: She is obese. She is ill-appearing.   HENT:      Head: Normocephalic and atraumatic.      Nose: Nose normal.      Mouth/Throat:      Mouth: Mucous membranes are dry.   Eyes:      Extraocular Movements: Extraocular movements intact.      Conjunctiva/sclera: Conjunctivae normal.   Cardiovascular:      Rate and Rhythm: Normal rate and regular rhythm.      Pulses: Normal pulses.      Heart sounds: Normal heart sounds.   Pulmonary:      Effort: Pulmonary effort is normal.      Breath  sounds: Normal breath sounds.   Abdominal:      General: Bowel sounds are normal.      Palpations: Abdomen is soft.   Musculoskeletal:         General: Normal range of motion.      Cervical back: Normal range of motion and neck supple.   Skin:     General: Skin is warm and dry.   Neurological:      General: No focal deficit present.      Mental Status: She is alert and oriented to person, place, and time. Mental status is at baseline.   Psychiatric:         Mood and Affect: Mood normal.         Relevant Results               Assessment/Plan          68 y.o. female with a significant past medical history of Anxiety presenting to [place] emergency department with complaints of general illness, fatigue x 4 to 5 days.  COVID swab is positive patient reports high anxiety as it relates to going home.  Discharge was recommended with supportive care at home but the patient refused and stated she wants to stay another day and will go home tomorrow.        Principal Problem:    COVID    Anxiety  COVID-positive  Isolation precautions, COVID symptoms began approximately 4 to 5 days ago, per chart family  from COVID infection patient has increased anxiety secondary to her current COVID infection diagnosis, tolerating oral intake, no  symptoms..    -Remains on room air.  No chest pain.  - Respiratory supportive care  -Continue with home medications including tramadol and Celexa.  -Added hydroxyzine for anxiety as well.  Patient is stable for discharge was recommended discharge again but the patient refused and wants to stay in the hospital another day and states will go home tomorrow.     VTE Prophylaxis: Vinnie Ambrose MD

## 2023-12-12 LAB — BACTERIA UR CULT: NORMAL

## 2024-04-09 ENCOUNTER — OFFICE VISIT (OUTPATIENT)
Dept: PULMONOLOGY | Age: 69
End: 2024-04-09
Payer: MEDICARE

## 2024-04-09 VITALS
WEIGHT: 200 LBS | HEART RATE: 77 BPM | DIASTOLIC BLOOD PRESSURE: 68 MMHG | TEMPERATURE: 97.6 F | BODY MASS INDEX: 34.15 KG/M2 | HEIGHT: 64 IN | OXYGEN SATURATION: 98 % | SYSTOLIC BLOOD PRESSURE: 112 MMHG

## 2024-04-09 DIAGNOSIS — R05.9 COUGH, UNSPECIFIED TYPE: Primary | ICD-10-CM

## 2024-04-09 DIAGNOSIS — U07.1 COVID-19 VIRUS INFECTION: ICD-10-CM

## 2024-04-09 DIAGNOSIS — J18.9 COMMUNITY ACQUIRED PNEUMONIA, UNSPECIFIED LATERALITY: ICD-10-CM

## 2024-04-09 DIAGNOSIS — R06.02 SHORTNESS OF BREATH: ICD-10-CM

## 2024-04-09 PROCEDURE — 1090F PRES/ABSN URINE INCON ASSESS: CPT | Performed by: INTERNAL MEDICINE

## 2024-04-09 PROCEDURE — 3017F COLORECTAL CA SCREEN DOC REV: CPT | Performed by: INTERNAL MEDICINE

## 2024-04-09 PROCEDURE — 99203 OFFICE O/P NEW LOW 30 MIN: CPT | Performed by: INTERNAL MEDICINE

## 2024-04-09 PROCEDURE — 1123F ACP DISCUSS/DSCN MKR DOCD: CPT | Performed by: INTERNAL MEDICINE

## 2024-04-09 PROCEDURE — G8417 CALC BMI ABV UP PARAM F/U: HCPCS | Performed by: INTERNAL MEDICINE

## 2024-04-09 PROCEDURE — 1036F TOBACCO NON-USER: CPT | Performed by: INTERNAL MEDICINE

## 2024-04-09 PROCEDURE — G8428 CUR MEDS NOT DOCUMENT: HCPCS | Performed by: INTERNAL MEDICINE

## 2024-04-09 PROCEDURE — G8399 PT W/DXA RESULTS DOCUMENT: HCPCS | Performed by: INTERNAL MEDICINE

## 2024-04-09 NOTE — PROGRESS NOTES
rashes or lesions   Neurological: No focal deficits,cranial nerves grossly intact. No weakness. Sensation normal   Psych: Normal Mood  Musculoskeletal: No joint abnormalities.                 Impression:   Diagnosis Orders   1. Cough, unspecified type  CT CHEST WO CONTRAST    Full PFT Study With Bronchodilator      2. Shortness of breath        3. COVID-19 virus infection        4. Community acquired pneumonia, unspecified laterality                Orders Placed This Encounter   Procedures    CT CHEST WO CONTRAST     Standing Status:   Future     Standing Expiration Date:   4/10/2025    Full PFT Study With Bronchodilator     If an ABG is needed along with this PFT procedure, please place the appropriate lab order     Standing Status:   Future     Standing Expiration Date:   7/9/2024        Recommendations:     -Her symptoms could represent reactive airway disease.  She had used albuterol rescue inhaler in the past.  She is benefiting from steroid inhaler now.  Would recommend to continue using it.  -Due to symptoms not resolving for the last 3 months, I will proceed with CT chest to assess for ILD, post viral changes or others.  -Will also order pulm function test.  -Consider testing for sleep disordered breathing down the road.       Return in about 4 weeks (around 5/7/2024).       Electronically signed by Michael Taylor MD on 4/9/2024 at 10:01 AM

## 2024-04-24 ENCOUNTER — HOSPITAL ENCOUNTER (OUTPATIENT)
Dept: CT IMAGING | Age: 69
Discharge: HOME | End: 2024-04-26
Payer: MEDICARE

## 2024-04-24 DIAGNOSIS — R05.9 COUGH, UNSPECIFIED TYPE: ICD-10-CM

## 2024-04-24 PROCEDURE — 71250 CT THORAX DX C-: CPT

## 2024-05-13 ENCOUNTER — HOSPITAL ENCOUNTER (OUTPATIENT)
Dept: PULMONOLOGY | Age: 69
Discharge: HOME | End: 2024-05-13
Payer: MEDICARE

## 2024-05-13 DIAGNOSIS — R05.9 COUGH, UNSPECIFIED TYPE: ICD-10-CM

## 2024-05-13 PROCEDURE — 94726 PLETHYSMOGRAPHY LUNG VOLUMES: CPT

## 2024-05-13 PROCEDURE — 6360000002 HC RX W HCPCS

## 2024-05-13 PROCEDURE — 94060 EVALUATION OF WHEEZING: CPT

## 2024-05-13 PROCEDURE — 94729 DIFFUSING CAPACITY: CPT

## 2024-05-13 RX ORDER — ALBUTEROL SULFATE 2.5 MG/3ML
SOLUTION RESPIRATORY (INHALATION)
Status: COMPLETED
Start: 2024-05-13 | End: 2024-05-13

## 2024-05-13 RX ADMIN — ALBUTEROL SULFATE 2.5 MG: 2.5 SOLUTION RESPIRATORY (INHALATION) at 08:19

## 2024-05-22 ENCOUNTER — OFFICE VISIT (OUTPATIENT)
Dept: PULMONOLOGY | Age: 69
End: 2024-05-22
Payer: MEDICARE

## 2024-05-22 VITALS
TEMPERATURE: 97.8 F | DIASTOLIC BLOOD PRESSURE: 66 MMHG | BODY MASS INDEX: 34.49 KG/M2 | HEART RATE: 89 BPM | WEIGHT: 202 LBS | HEIGHT: 64 IN | SYSTOLIC BLOOD PRESSURE: 138 MMHG | OXYGEN SATURATION: 93 %

## 2024-05-22 DIAGNOSIS — R05.9 COUGH, UNSPECIFIED TYPE: Primary | ICD-10-CM

## 2024-05-22 DIAGNOSIS — G47.33 OSA (OBSTRUCTIVE SLEEP APNEA): ICD-10-CM

## 2024-05-22 DIAGNOSIS — R06.02 SHORTNESS OF BREATH: ICD-10-CM

## 2024-05-22 PROCEDURE — 1123F ACP DISCUSS/DSCN MKR DOCD: CPT | Performed by: INTERNAL MEDICINE

## 2024-05-22 PROCEDURE — G8427 DOCREV CUR MEDS BY ELIG CLIN: HCPCS | Performed by: INTERNAL MEDICINE

## 2024-05-22 PROCEDURE — 99213 OFFICE O/P EST LOW 20 MIN: CPT | Performed by: INTERNAL MEDICINE

## 2024-05-22 PROCEDURE — G8399 PT W/DXA RESULTS DOCUMENT: HCPCS | Performed by: INTERNAL MEDICINE

## 2024-05-22 PROCEDURE — 1090F PRES/ABSN URINE INCON ASSESS: CPT | Performed by: INTERNAL MEDICINE

## 2024-05-22 PROCEDURE — 1036F TOBACCO NON-USER: CPT | Performed by: INTERNAL MEDICINE

## 2024-05-22 PROCEDURE — 3017F COLORECTAL CA SCREEN DOC REV: CPT | Performed by: INTERNAL MEDICINE

## 2024-05-22 PROCEDURE — G8417 CALC BMI ABV UP PARAM F/U: HCPCS | Performed by: INTERNAL MEDICINE

## 2024-05-22 RX ORDER — FLUTICASONE FUROATE AND VILANTEROL 100; 25 UG/1; UG/1
1 POWDER RESPIRATORY (INHALATION) DAILY
Qty: 1 EACH | Refills: 3 | Status: SHIPPED | OUTPATIENT
Start: 2024-05-22 | End: 2024-05-24 | Stop reason: SDUPTHER

## 2024-05-22 NOTE — PROGRESS NOTES
PATIENT VISIT-PULMONARY/SLEEP    5/22/2024     REFERRING PHYSICIAN:  Deo Ramírez MD     REASON FOR REFERRAL:  SOB    HPI:     Fabienne Maza is a 69 y.o. female who was referred to pulmonary clinic for evaluation.     Has COVID-pneumonia in December and was given antiviral.  She recovered a bit but then had more symptoms in January and was hospitalized in [place] for 3 days.  She was told that she had pneumonia.  She was given antibiotic.  She was discharged on room air but continued to have worsening symptoms since then.  Continues to have cough, shortness of breath and some wheezing.  Has been given another course of steroids recently.  She is not able to talk loud without coughing.  She denies fever.  Her cough is dry.  She is short of breath with activities.  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    Has been started 5 days ago on a steroid inhaler and has been feeling a bit better.  Her voice is better now.  Has used albuterol rescue inhaler in the past.  Was told that she might have a mild case of asthma.          5/22/24:    Has been doing relatively well.  Underwent CT chest which I reviewed personally and interpreted the results independently.    Pulmonary function test showed normal spirometry.  There was significant response to bronchodilators in mid flows.  Lung volumes were normal.  There is minimal reduction in diffusion capacity.  Of note she had a sleep study in 2020 which showed mild obstructive sleep apnea.  She has not been on treatment.    Past Medical History   Past Medical History:   Diagnosis Date    Atrial fibrillation (HCC)     Hyperlipidemia        Past Surgical History  Past Surgical History:   Procedure Laterality Date    APPENDECTOMY      BACK SURGERY      CHOLECYSTECTOMY      COLON SURGERY      HYSTERECTOMY (CERVIX STATUS UNKNOWN)         Allergies  Allergies   Allergen Reactions    Advair Diskus [Fluticasone-Salmeterol]     Sulfa

## 2024-05-24 RX ORDER — FLUTICASONE FUROATE AND VILANTEROL 100; 25 UG/1; UG/1
1 POWDER RESPIRATORY (INHALATION) DAILY
Qty: 1 EACH | Refills: 3 | Status: SHIPPED | OUTPATIENT
Start: 2024-05-24

## 2024-06-05 ENCOUNTER — HOSPITAL ENCOUNTER (OUTPATIENT)
Dept: RADIOLOGY | Facility: HOSPITAL | Age: 69
Discharge: HOME | End: 2024-06-05
Payer: MEDICARE

## 2024-06-05 DIAGNOSIS — M25.569 PAIN IN UNSPECIFIED KNEE: ICD-10-CM

## 2024-06-05 PROCEDURE — 73562 X-RAY EXAM OF KNEE 3: CPT | Mod: BILATERAL PROCEDURE | Performed by: RADIOLOGY

## 2024-06-05 PROCEDURE — 73562 X-RAY EXAM OF KNEE 3: CPT | Mod: 50

## 2024-06-27 ENCOUNTER — HOSPITAL ENCOUNTER (OUTPATIENT)
Dept: SLEEP CENTER | Age: 69
Discharge: HOME | End: 2024-06-29
Attending: INTERNAL MEDICINE
Payer: MEDICARE

## 2024-06-27 DIAGNOSIS — G47.33 OSA (OBSTRUCTIVE SLEEP APNEA): ICD-10-CM

## 2024-06-27 PROCEDURE — 95806 SLEEP STUDY UNATT&RESP EFFT: CPT

## 2024-07-22 PROBLEM — G47.33 OSA (OBSTRUCTIVE SLEEP APNEA): Status: ACTIVE | Noted: 2024-07-22

## 2024-07-24 ENCOUNTER — TELEPHONE (OUTPATIENT)
Dept: PULMONOLOGY | Age: 69
End: 2024-07-24

## 2024-07-24 NOTE — TELEPHONE ENCOUNTER
PT CALLED THE OFFICE BECAUSE SHE HAD HST DONE AND IS ASKING FOR THE RESULTS.       IF SHE NEEDS A APAP SHE WOULD LIKE TO ORDER MACHINE PRIOR TO BEING SEEN IN THE OFFICE.

## 2024-07-25 PROBLEM — M54.16 LUMBAR RADICULOPATHY: Status: ACTIVE | Noted: 2023-08-03

## 2024-07-25 PROBLEM — Z86.79 S/P ABLATION OF ATRIAL FIBRILLATION: Status: ACTIVE | Noted: 2023-05-16

## 2024-07-25 PROBLEM — F32.9 MAJOR DEPRESSIVE DISORDER, SINGLE EPISODE, UNSPECIFIED: Status: ACTIVE | Noted: 2020-02-27

## 2024-07-25 PROBLEM — G89.29 CHRONIC PAIN: Status: ACTIVE | Noted: 2017-04-19

## 2024-07-25 PROBLEM — Z98.890 S/P ABLATION OF ATRIAL FIBRILLATION: Status: ACTIVE | Noted: 2023-05-16

## 2024-07-25 PROBLEM — D64.9 ANEMIA: Status: ACTIVE | Noted: 2024-07-25

## 2024-07-25 PROBLEM — I10 ESSENTIAL (PRIMARY) HYPERTENSION: Status: ACTIVE | Noted: 2020-02-27

## 2024-07-25 PROBLEM — M19.91 LOCALIZED, PRIMARY OSTEOARTHRITIS: Status: ACTIVE | Noted: 2020-09-28

## 2024-07-25 PROBLEM — M48.00 SPINAL STENOSIS, SITE UNSPECIFIED: Status: ACTIVE | Noted: 2020-02-27

## 2024-07-25 PROBLEM — N18.30 CKD (CHRONIC KIDNEY DISEASE) STAGE 3, GFR 30-59 ML/MIN (HCC): Status: ACTIVE | Noted: 2022-09-01

## 2024-07-25 PROBLEM — Z79.01 CURRENT USE OF LONG TERM ANTICOAGULATION: Status: ACTIVE | Noted: 2019-06-04

## 2024-07-25 PROBLEM — Z86.16 HISTORY OF COVID-19: Status: ACTIVE | Noted: 2024-01-08

## 2024-07-25 PROBLEM — D50.0 IRON DEFICIENCY ANEMIA SECONDARY TO BLOOD LOSS (CHRONIC): Status: ACTIVE | Noted: 2020-02-27

## 2024-07-25 PROBLEM — I95.1 ORTHOSTATIC HYPOTENSION: Status: ACTIVE | Noted: 2019-06-06

## 2024-07-25 PROBLEM — M54.12 RADICULOPATHY, CERVICAL REGION: Status: ACTIVE | Noted: 2020-02-27

## 2024-07-25 PROBLEM — E78.5 HYPERLIPIDEMIA, UNSPECIFIED: Status: ACTIVE | Noted: 2020-02-27

## 2024-07-25 PROBLEM — J45.20 MILD INTERMITTENT ASTHMA, UNCOMPLICATED: Status: ACTIVE | Noted: 2020-02-27

## 2024-07-25 PROBLEM — M45.9 ANKYLOSING SPONDYLITIS (HCC): Status: ACTIVE | Noted: 2024-01-15

## 2024-07-25 PROBLEM — R09.02 HYPOXEMIA: Status: ACTIVE | Noted: 2024-07-25

## 2024-07-25 PROBLEM — I48.19 PERSISTENT ATRIAL FIBRILLATION (HCC): Status: ACTIVE | Noted: 2019-08-12

## 2024-07-25 PROBLEM — M96.1 POSTLAMINECTOMY SYNDROME OF LUMBAR REGION: Status: ACTIVE | Noted: 2023-08-03

## 2024-07-25 PROBLEM — R53.1 WEAKNESS: Status: ACTIVE | Noted: 2023-12-10

## 2024-07-26 NOTE — TELEPHONE ENCOUNTER
Has a mild case. It is not urgent to treat. Would rather discuss options of treatment vs weight loss for this mild case in next visit.

## 2024-11-05 ENCOUNTER — APPOINTMENT (OUTPATIENT)
Dept: ALLERGY | Facility: CLINIC | Age: 69
End: 2024-11-05
Payer: MEDICARE

## 2024-11-06 ENCOUNTER — HOSPITAL ENCOUNTER (OUTPATIENT)
Dept: RADIOLOGY | Facility: HOSPITAL | Age: 69
Discharge: HOME | End: 2024-11-06
Payer: MEDICARE

## 2024-11-06 ENCOUNTER — OFFICE VISIT (OUTPATIENT)
Dept: NEUROSURGERY | Facility: CLINIC | Age: 69
End: 2024-11-06
Payer: MEDICARE

## 2024-11-06 VITALS
DIASTOLIC BLOOD PRESSURE: 78 MMHG | BODY MASS INDEX: 34.01 KG/M2 | SYSTOLIC BLOOD PRESSURE: 106 MMHG | HEIGHT: 65 IN | WEIGHT: 204.1 LBS | TEMPERATURE: 96.9 F | HEART RATE: 80 BPM

## 2024-11-06 DIAGNOSIS — M54.41 ACUTE MIDLINE LOW BACK PAIN WITH BILATERAL SCIATICA: Primary | ICD-10-CM

## 2024-11-06 DIAGNOSIS — M43.25 FUSION OF SPINE, THORACOLUMBAR REGION: ICD-10-CM

## 2024-11-06 DIAGNOSIS — M54.6 ACUTE MIDLINE THORACIC BACK PAIN: ICD-10-CM

## 2024-11-06 DIAGNOSIS — M54.42 ACUTE MIDLINE LOW BACK PAIN WITH BILATERAL SCIATICA: ICD-10-CM

## 2024-11-06 DIAGNOSIS — M54.41 ACUTE MIDLINE LOW BACK PAIN WITH BILATERAL SCIATICA: ICD-10-CM

## 2024-11-06 DIAGNOSIS — M46.1 SACROILIITIS, NOT ELSEWHERE CLASSIFIED (CMS-HCC): ICD-10-CM

## 2024-11-06 DIAGNOSIS — M54.42 ACUTE MIDLINE LOW BACK PAIN WITH BILATERAL SCIATICA: Primary | ICD-10-CM

## 2024-11-06 PROCEDURE — 1036F TOBACCO NON-USER: CPT | Performed by: PHYSICIAN ASSISTANT

## 2024-11-06 PROCEDURE — 72120 X-RAY BEND ONLY L-S SPINE: CPT

## 2024-11-06 PROCEDURE — 99214 OFFICE O/P EST MOD 30 MIN: CPT | Performed by: PHYSICIAN ASSISTANT

## 2024-11-06 PROCEDURE — 1159F MED LIST DOCD IN RCRD: CPT | Performed by: PHYSICIAN ASSISTANT

## 2024-11-06 PROCEDURE — 72070 X-RAY EXAM THORAC SPINE 2VWS: CPT

## 2024-11-06 PROCEDURE — 3008F BODY MASS INDEX DOCD: CPT | Performed by: PHYSICIAN ASSISTANT

## 2024-11-06 PROCEDURE — 72202 X-RAY EXAM SI JOINTS 3/> VWS: CPT

## 2024-11-06 PROCEDURE — 1125F AMNT PAIN NOTED PAIN PRSNT: CPT | Performed by: PHYSICIAN ASSISTANT

## 2024-11-06 RX ORDER — ALBUTEROL SULFATE 90 UG/1
INHALANT RESPIRATORY (INHALATION)
COMMUNITY
Start: 2023-12-09

## 2024-11-06 RX ORDER — TRAZODONE HYDROCHLORIDE 50 MG/1
50 TABLET ORAL NIGHTLY
COMMUNITY

## 2024-11-06 RX ORDER — CELECOXIB 100 MG/1
100 CAPSULE ORAL 2 TIMES DAILY PRN
COMMUNITY

## 2024-11-06 RX ORDER — ALBUTEROL SULFATE 0.83 MG/ML
2.5 SOLUTION RESPIRATORY (INHALATION) EVERY 4 HOURS PRN
COMMUNITY
Start: 2024-04-11

## 2024-11-06 RX ORDER — VIT C/E/ZN/COPPR/LUTEIN/ZEAXAN 250MG-90MG
500 CAPSULE ORAL
COMMUNITY
Start: 2024-02-26

## 2024-11-06 RX ORDER — MAGNESIUM HYDROXIDE 400 MG/5ML
1 SUSPENSION, ORAL (FINAL DOSE FORM) ORAL DAILY
COMMUNITY

## 2024-11-06 RX ORDER — FLECAINIDE ACETATE 50 MG/1
50 TABLET ORAL DAILY
COMMUNITY

## 2024-11-06 RX ORDER — FERROUS SULFATE TAB 325 MG (65 MG ELEMENTAL FE) 325 (65 FE) MG
1 TAB ORAL
COMMUNITY
Start: 2024-01-11

## 2024-11-06 RX ORDER — METHOCARBAMOL 500 MG/1
500 TABLET, FILM COATED ORAL AS NEEDED
COMMUNITY
Start: 2024-03-13

## 2024-11-06 RX ORDER — FLUTICASONE FUROATE AND VILANTEROL TRIFENATATE 100; 25 UG/1; UG/1
1 POWDER RESPIRATORY (INHALATION) DAILY
COMMUNITY

## 2024-11-06 ASSESSMENT — PATIENT HEALTH QUESTIONNAIRE - PHQ9
SUM OF ALL RESPONSES TO PHQ9 QUESTIONS 1 & 2: 0
1. LITTLE INTEREST OR PLEASURE IN DOING THINGS: NOT AT ALL
2. FEELING DOWN, DEPRESSED OR HOPELESS: NOT AT ALL

## 2024-11-06 ASSESSMENT — PAIN SCALES - GENERAL: PAINLEVEL_OUTOF10: 6

## 2024-11-06 NOTE — LETTER
November 6, 2024     Patient: Lanie Pablo   YOB: 1955   Date of Visit: 11/6/2024       To Whom It May Concern:    Lanie Pablo was seen in my clinic on 11/6/2024 at 10:00 am. Please excuse Lanie for her absence from work on this day to make the appointment.    If you have any questions or concerns, please don't hesitate to call.         Sincerely,         Giovanni Ahuja PA-C        CC: No Recipients

## 2024-11-06 NOTE — PROGRESS NOTES
"[facility] [place]  Department of Neurological Surgery  Established Patient Visit    History of Present Illness  Lanie Pablo is a 69 y.o. year old female who presents to the spine clinic in follow up for mid back and low back pain. She previously underwent a T10 to pelvis extension/revision for proximal junctional kyphosis following an L2 to pelvis complex revision surgery with Dr. Mike Quigley in 2/2020. She reports for the past six months pain has progressively worsened. She has a spinal stimulator in place that was implanted in 2021, that has been checked and is functioning appropriately.  She endorses mid and low back pain that has been acutely worsening over the past couple of weeks. She takes 3-4 Tramadol daily, which she reports is not working as effectively as it used to. Has been trying ice, ibuprofen, rest and Tylenol with little to no relief. Follows with pain management. She has not had any recent injections. Reports bilateral radicular leg pain that has progressively worsened over the past 6 months. Reports numbness in both feet that was previously in her toes but has progressed up the feet bilaterally. Pain in the low back is described as shooting or \"jolting\" at times. She reports difficulty sleeping due to symptoms.        TREATMENTS:  PT; none in the last year - historically makes symptoms worse  SMOKER: Non-smoker    ROS x 10 is, otherwise, negative unless documented in HPI above    Patient Active Problem List   Diagnosis    COVID     Past Medical History:   Diagnosis Date    Personal history of other diseases of the circulatory system 08/13/2021    History of atrial fibrillation    Personal history of other diseases of the musculoskeletal system and connective tissue 08/13/2021    History of degenerative disc disease    Personal history of other diseases of the respiratory system 08/13/2021    History of bronchitis    Personal history of other endocrine, nutritional and " metabolic disease     History of hypercholesterolemia     Past Surgical History:   Procedure Laterality Date    APPENDECTOMY  08/24/2017    Appendectomy    CERVICAL FUSION  08/24/2017    Cervical Vertebral Fusion    CHOLECYSTECTOMY  08/24/2017    Cholecystectomy    COLON SURGERY  08/13/2021    Colon Surgery    LUMBAR LAMINECTOMY  08/24/2017    Laminectomy Lumbar    OTHER SURGICAL HISTORY  08/24/2017    Spinal Stereotaxis Stimulation Of Cord     Social History     Tobacco Use    Smoking status: Never     Passive exposure: Never    Smokeless tobacco: Never   Substance Use Topics    Alcohol use: Not on file     family history is not on file.    Current Outpatient Medications:     acebutolol (Sectral) 200 mg capsule, Take 1 capsule (200 mg) by mouth 2 times a day., Disp: , Rfl:     apixaban (Eliquis) 5 mg tablet, Take 1 tablet (5 mg) by mouth twice a day., Disp: , Rfl:     biotin 1 mg capsule, Take 1 tablet by mouth once daily., Disp: , Rfl:     cholecalciferol (Vitamin D-3) 25 MCG (1000 UT) tablet, Take 1 tablet (1,000 Units) by mouth once daily., Disp: , Rfl:     citalopram (CeleXA) 40 mg tablet, Take 1 tablet (40 mg) by mouth once daily., Disp: , Rfl:     cyclobenzaprine (Flexeril) 10 mg tablet, Take 1 tablet (10 mg) by mouth 2 times a day as needed., Disp: , Rfl:     docusate sodium (Colace) 100 mg capsule, Take 1 capsule (100 mg) by mouth 2 times a day as needed., Disp: , Rfl:     dofetilide (Tikosyn) 250 mcg capsule, Take 1 capsule (250 mcg) by mouth twice a day., Disp: , Rfl:     magnesium oxide (Mag-Ox) 200 mg split tablet, Take 2 half tablet (400 mg) by mouth once daily., Disp: , Rfl:     multivitamin tablet, Take 1 tablet by mouth once daily., Disp: , Rfl:     pantoprazole (ProtoNix) 40 mg EC tablet, Take 1 tablet (40 mg) by mouth once daily in the morning. Take before meals., Disp: , Rfl:     pregabalin (Lyrica) 100 mg capsule, Take 1 capsule (100 mg) by mouth once daily., Disp: , Rfl:     spironolactone  (Aldactone) 25 mg tablet, Take 1 tablet (25 mg) by mouth once daily., Disp: , Rfl:     traMADol (Ultram) 50 mg tablet, Take 2 tablets (100 mg) by mouth every 8 hours if needed for moderate pain (4 - 6)., Disp: , Rfl:   Allergies   Allergen Reactions    Advair Diskus [Fluticasone Propion-Salmeterol] Angioedema    Colchicine Other    Sulfa (Sulfonamide Antibiotics) Rash       Physical Examination:    General: Well developed, awake/alert/oriented x3, no distress, alert and cooperative  Skin: Warm and dry, no lesions, no rashes  ENMT: Mucous membranes moist, no apparent injury, no lesions seen  Head/Neck: Neck Supple, no apparent injury  Respiratory/Thorax: Normal breath sounds with good chest expansion, thorax symmetric  Cardiovascular: No pitting edema, no JVD    Motor Strength: PF/DF/HF 5/5 bilaterally, KE 4+/5 bilaterally     Muscle Bulk: Normal and symmetric in all extremities    Posture:   -- Cervical: Normal  -- Thoracic: Normal  -- Lumbar : Normal  Paraspinal muscle spasm/tenderness absent.   Tenderness to palpation over the bilateral SI joints    Sensation: intact to light touch throughout       Assessment and Plan:      Lanie Pablo is a 69 y.o. year old female who presents to the spine clinic in follow up with increasing mid and low back pain as well as radicular leg pain and foot paresthesias. We will set her up with a new set of dynamic lumbar and thoracic x-rays, as well as CT imaging of the lumbar spine and thoracic spine to evaluate bony anatomy and fusion construct. Will also check x-rays of the SI joints given reproducible pain bilaterally. She will follow up upon completion of imaging studies to discuss further plan of care at that time.         The above clinical summary has been dictated with voice recognition software. It has not been proofread for grammatical errors, typographical mistakes, or other semantic inconsistencies.     Thank you for visiting our office today. It was our pleasure to  take part in your healthcare.      Do not hesitate to call with any questions regarding your plan of care after leaving at [phone] M-F 8am-4pm.      To clinicians, thank you very much for this kind referral. It is a privilege to partner with you in the care of your patients. My office would be delighted to assist you with any further consultations or with questions regarding the plan of care outlined. Do not hesitate to call the office or contact me directly.         Sincerely,          Kortney CABRERA[place] [address] [address] [place], [place] [address]     Phone: [phone]  Fax: [phone]

## 2024-11-15 ENCOUNTER — HOSPITAL ENCOUNTER (OUTPATIENT)
Dept: RADIOLOGY | Facility: HOSPITAL | Age: 69
Discharge: HOME | End: 2024-11-15
Payer: MEDICARE

## 2024-11-15 DIAGNOSIS — M54.42 ACUTE MIDLINE LOW BACK PAIN WITH BILATERAL SCIATICA: ICD-10-CM

## 2024-11-15 DIAGNOSIS — M54.6 ACUTE MIDLINE THORACIC BACK PAIN: ICD-10-CM

## 2024-11-15 DIAGNOSIS — M54.41 ACUTE MIDLINE LOW BACK PAIN WITH BILATERAL SCIATICA: ICD-10-CM

## 2024-11-15 DIAGNOSIS — M43.25 FUSION OF SPINE, THORACOLUMBAR REGION: ICD-10-CM

## 2024-11-15 PROCEDURE — 72131 CT LUMBAR SPINE W/O DYE: CPT

## 2024-11-15 PROCEDURE — 72128 CT CHEST SPINE W/O DYE: CPT

## 2024-12-09 ENCOUNTER — APPOINTMENT (OUTPATIENT)
Facility: CLINIC | Age: 69
End: 2024-12-09
Payer: MEDICARE

## 2024-12-17 RX ORDER — LIDOCAINE 560 MG/1
1 PATCH PERCUTANEOUS; TOPICAL; TRANSDERMAL
COMMUNITY
Start: 2024-07-19

## 2024-12-19 ENCOUNTER — OFFICE VISIT (OUTPATIENT)
Facility: CLINIC | Age: 69
End: 2024-12-19
Payer: MEDICARE

## 2024-12-19 VITALS
HEIGHT: 65 IN | SYSTOLIC BLOOD PRESSURE: 140 MMHG | HEART RATE: 67 BPM | WEIGHT: 200 LBS | DIASTOLIC BLOOD PRESSURE: 70 MMHG | BODY MASS INDEX: 33.32 KG/M2

## 2024-12-19 DIAGNOSIS — M46.1 SACROILIITIS, NOT ELSEWHERE CLASSIFIED (CMS-HCC): Primary | ICD-10-CM

## 2024-12-19 DIAGNOSIS — M54.42 ACUTE MIDLINE LOW BACK PAIN WITH BILATERAL SCIATICA: ICD-10-CM

## 2024-12-19 DIAGNOSIS — M54.41 ACUTE MIDLINE LOW BACK PAIN WITH BILATERAL SCIATICA: ICD-10-CM

## 2024-12-19 ASSESSMENT — ENCOUNTER SYMPTOMS
OCCASIONAL FEELINGS OF UNSTEADINESS: 0
LOSS OF SENSATION IN FEET: 1
DEPRESSION: 0

## 2024-12-19 ASSESSMENT — PATIENT HEALTH QUESTIONNAIRE - PHQ9
2. FEELING DOWN, DEPRESSED OR HOPELESS: NOT AT ALL
1. LITTLE INTEREST OR PLEASURE IN DOING THINGS: NOT AT ALL
SUM OF ALL RESPONSES TO PHQ9 QUESTIONS 1 & 2: 0

## 2024-12-19 NOTE — PROGRESS NOTES
[facility] [place]  Department of Neurological Surgery  Established Patient Visit    History of Present Illness  Lanie Pablo is a 69 y.o. year old female who presents to the spine clinic in follow up her CT scan and x-rays performed.  Patient was seen by Kortney Pierce, nurse practitioner with our neurosurgery office in early November.  She continues to endorse midthoracic pain extending laterally between her shoulder blades as well as low back pain radiating into her legs laterally through her thigh continuing just past her knee as well as lateral low back pain near her SI joints.  CT scans reviewed with patient.    Patient's BMI is Body mass index is 33.8 kg/m².    14/14 systems reviewed and negative other than what is listed in the history of present illness    Patient Active Problem List   Diagnosis    COVID     Past Medical History:   Diagnosis Date    Personal history of other diseases of the circulatory system 08/13/2021    History of atrial fibrillation    Personal history of other diseases of the musculoskeletal system and connective tissue 08/13/2021    History of degenerative disc disease    Personal history of other diseases of the respiratory system 08/13/2021    History of bronchitis    Personal history of other endocrine, nutritional and metabolic disease     History of hypercholesterolemia     Past Surgical History:   Procedure Laterality Date    APPENDECTOMY  08/24/2017    Appendectomy    CERVICAL FUSION  08/24/2017    Cervical Vertebral Fusion    CHOLECYSTECTOMY  08/24/2017    Cholecystectomy    COLON SURGERY  08/13/2021    Colon Surgery    LUMBAR LAMINECTOMY  08/24/2017    Laminectomy Lumbar    OTHER SURGICAL HISTORY  08/24/2017    Spinal Stereotaxis Stimulation Of Cord     Social History     Tobacco Use    Smoking status: Never     Passive exposure: Never    Smokeless tobacco: Never   Substance Use Topics    Alcohol use: Not Currently     family history is not on  file.    Current Outpatient Medications:     lidocaine 4 % patch, Place 1 patch on the skin once daily., Disp: , Rfl:     acebutolol (Sectral) 200 mg capsule, Take 1 capsule (200 mg) by mouth 2 times a day. (Patient not taking: Reported on 11/6/2024), Disp: , Rfl:     albuterol 2.5 mg /3 mL (0.083 %) nebulizer solution, Inhale 3 mL (2.5 mg) every 4 hours if needed., Disp: , Rfl:     albuterol 90 mcg/actuation inhaler, INHALE 2 PUFFS BY MOUTH AS DIRECTED EVERY 4 HOURS AS NEEDED FOR WHEEZING AND FOR SHORTNESS OF BREATH, Disp: , Rfl:     apixaban (Eliquis) 5 mg tablet, Take 1 tablet (5 mg) by mouth twice a day. (Patient not taking: Reported on 11/6/2024), Disp: , Rfl:     biotin 1 mg capsule, Take 1 tablet by mouth once daily., Disp: , Rfl:     Breo Ellipta 100-25 mcg/dose inhaler, Inhale 1 puff once daily., Disp: , Rfl:     celecoxib (CeleBREX) 100 mg capsule, Take 1 capsule (100 mg) by mouth 2 times a day as needed., Disp: , Rfl:     cholecalciferol (Vitamin D-3) 25 MCG (1000 UT) tablet, Take 1 tablet (1,000 Units) by mouth once daily., Disp: , Rfl:     citalopram (CeleXA) 40 mg tablet, Take 1 tablet (40 mg) by mouth once daily., Disp: , Rfl:     cyanocobalamin (Vitamin B-12) 500 mcg tablet, Take 1 tablet (500 mcg) by mouth once daily., Disp: , Rfl:     cyclobenzaprine (Flexeril) 10 mg tablet, Take 1 tablet (10 mg) by mouth 2 times a day as needed., Disp: , Rfl:     docusate sodium (Colace) 100 mg capsule, Take 1 capsule (100 mg) by mouth 2 times a day as needed., Disp: , Rfl:     dofetilide (Tikosyn) 250 mcg capsule, Take 1 capsule (250 mcg) by mouth twice a day. (Patient not taking: Reported on 11/6/2024), Disp: , Rfl:     FeroSuL tablet, Take 1 tablet (325 mg) by mouth early in the morning.., Disp: , Rfl:     flecainide (Tambocor) 50 mg tablet, Take 1 tablet (50 mg) by mouth once daily., Disp: , Rfl:     magnesium oxide (Mag-Ox) 200 mg split tablet, Take 2 half tablet (400 mg) by mouth once daily., Disp: , Rfl:      methocarbamol (Robaxin) 500 mg tablet, Take 1 tablet (500 mg) by mouth if needed for muscle spasms., Disp: , Rfl:     multivitamin tablet, Take 1 tablet by mouth once daily., Disp: , Rfl:     pantoprazole (ProtoNix) 40 mg EC tablet, Take 1 tablet (40 mg) by mouth once daily in the morning. Take before meals., Disp: , Rfl:     potassium gluconate 595 mg (99 mg) tablet, Take 1 tablet by mouth once daily., Disp: , Rfl:     pregabalin (Lyrica) 100 mg capsule, Take 1 capsule (100 mg) by mouth once daily. (Patient taking differently: Take 1 capsule (100 mg) by mouth if needed.), Disp: , Rfl:     spironolactone (Aldactone) 25 mg tablet, Take 1 tablet (25 mg) by mouth once daily. (Patient not taking: Reported on 11/6/2024), Disp: , Rfl:     traMADol (Ultram) 50 mg tablet, Take 2 tablets (100 mg) by mouth every 8 hours if needed for moderate pain (4 - 6)., Disp: , Rfl:     traZODone (Desyrel) 50 mg tablet, Take 1 tablet (50 mg) by mouth once daily at bedtime., Disp: , Rfl:   Allergies   Allergen Reactions    Advair Diskus [Fluticasone Propion-Salmeterol] Angioedema    Colchicine Other    Erythromycin Rash    Hydroxychloroquine Diarrhea and Hives    Morphine GI Upset and Nausea/vomiting    Sulfa (Sulfonamide Antibiotics) Rash       Physical Examination:    General: Well developed, awake/alert/oriented x3, no distress, alert and cooperative  Skin: Warm and dry, no lesions, no rashes  ENMT: Mucous membranes moist, no apparent injury, no lesions seen  Head/Neck: Neck Supple, no apparent injury  Respiratory/Thorax: Normal breath sounds with good chest expansion, thorax symmetric  Cardiovascular: No pitting edema, no JVD    Motor Strength: PF/DF/HF 5/5 bilaterally, knee extension E 4+/5 bilaterally      Muscle Bulk: Normal and symmetric in all extremities     Posture:   -- Cervical: Normal  -- Thoracic: Normal  -- Lumbar : Normal  Paraspinal muscle spasm/tenderness absent.   Tenderness to palpation over the bilateral SI joints  left slightly worse than right     Sensation: intact to light touch throughout           Assessment and Plan:    Lanie Pablo is a 69 y.o. year old female who presents to the spine clinic in follow up her CT scan and x-rays performed.  Patient was seen by Kortney Pierce, nurse practitioner with our neurosurgery office in early November.  She continues to endorse midthoracic pain extending laterally between her shoulder blades as well as low back pain radiating into her legs laterally through her thigh continuing just past her knee as well as lateral low back pain near her SI joints.  CT scans reviewed with patient.    Currently patient has symptoms and CT scan with disc bulge likely leading to lumbar radiculopathy and recommend follow-up with her pain management physician for diagnostic versus therapeutic injection targeting L4-5.  We also recommend SI joint injections for significant pain with direct palpation.  Further imaging to be ordered and follow-up with attending neurosurgeon to be scheduled if symptoms improve briefly with injection.      I have reviewed all prior documentation and reviewed the electronic medical record since admission. I have personally have reviewed all advanced imaging not just the reports and used my interpretation as documented as the relevant findings. I have reviewed the risks and benefits of all treatment recommendations listed in this note with the patient and family.       The above clinical summary has been dictated with voice recognition software. It has not been proofread for grammatical errors, typographical mistakes, or other semantic inconsistencies.    Thank you for visiting our office today. It was our pleasure to take part in your healthcare.     Do not hesitate to call with any questions regarding your plan of care after leaving at [phone] M-F 8am-4pm.     To clinicians, thank you very much for this kind referral. It is a privilege to partner with you in the  care of your patients. My office would be delighted to assist you with any further consultations or with questions regarding the plan of care outlined. Do not hesitate to call the office or contact me directly.       Sincerely,      JOELLE Allen, PADanyaC  Associate Physician Assistant, Neurosurgery  Clinical   [facility]    [place] [id number]    Phone: [phone]  Fax: [phone]

## 2025-01-21 ENCOUNTER — APPOINTMENT (OUTPATIENT)
Dept: RADIOLOGY | Facility: HOSPITAL | Age: 70
End: 2025-01-21
Payer: MEDICARE

## 2025-01-21 ENCOUNTER — HOSPITAL ENCOUNTER (EMERGENCY)
Facility: HOSPITAL | Age: 70
Discharge: HOME | End: 2025-01-21
Attending: STUDENT IN AN ORGANIZED HEALTH CARE EDUCATION/TRAINING PROGRAM
Payer: MEDICARE

## 2025-01-21 VITALS
SYSTOLIC BLOOD PRESSURE: 166 MMHG | WEIGHT: 200 LBS | HEART RATE: 56 BPM | HEIGHT: 65 IN | TEMPERATURE: 98.8 F | BODY MASS INDEX: 33.32 KG/M2 | RESPIRATION RATE: 18 BRPM | OXYGEN SATURATION: 99 % | DIASTOLIC BLOOD PRESSURE: 71 MMHG
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
## 2025-01-21 DIAGNOSIS — S52.502A NONDISPLACED FRACTURE OF DISTAL END OF LEFT RADIUS: ICD-10-CM

## 2025-01-21 PROCEDURE — 99284 EMERGENCY DEPT VISIT MOD MDM: CPT | Mod: 25 | Performed by: STUDENT IN AN ORGANIZED HEALTH CARE EDUCATION/TRAINING PROGRAM

## 2025-01-21 PROCEDURE — 2500000005 HC RX 250 GENERAL PHARMACY W/O HCPCS

## 2025-01-21 PROCEDURE — 70450 CT HEAD/BRAIN W/O DYE: CPT

## 2025-01-21 PROCEDURE — 2500000001 HC RX 250 WO HCPCS SELF ADMINISTERED DRUGS (ALT 637 FOR MEDICARE OP)

## 2025-01-21 PROCEDURE — 73110 X-RAY EXAM OF WRIST: CPT | Mod: 50

## 2025-01-21 PROCEDURE — 72170 X-RAY EXAM OF PELVIS: CPT

## 2025-01-21 PROCEDURE — 72131 CT LUMBAR SPINE W/O DYE: CPT

## 2025-01-21 PROCEDURE — 72128 CT CHEST SPINE W/O DYE: CPT

## 2025-01-21 PROCEDURE — 2500000004 HC RX 250 GENERAL PHARMACY W/ HCPCS (ALT 636 FOR OP/ED)

## 2025-01-21 PROCEDURE — 72125 CT NECK SPINE W/O DYE: CPT | Performed by: STUDENT IN AN ORGANIZED HEALTH CARE EDUCATION/TRAINING PROGRAM

## 2025-01-21 PROCEDURE — 70450 CT HEAD/BRAIN W/O DYE: CPT | Performed by: STUDENT IN AN ORGANIZED HEALTH CARE EDUCATION/TRAINING PROGRAM

## 2025-01-21 PROCEDURE — 96372 THER/PROPH/DIAG INJ SC/IM: CPT

## 2025-01-21 PROCEDURE — 73560 X-RAY EXAM OF KNEE 1 OR 2: CPT | Mod: 50

## 2025-01-21 PROCEDURE — 72125 CT NECK SPINE W/O DYE: CPT

## 2025-01-21 RX ORDER — ONDANSETRON 4 MG/1
4 TABLET, ORALLY DISINTEGRATING ORAL ONCE
Status: COMPLETED | OUTPATIENT
Start: 2025-01-21 | End: 2025-01-21

## 2025-01-21 RX ORDER — OXYCODONE HYDROCHLORIDE 5 MG/1
5 TABLET ORAL ONCE
Status: COMPLETED | OUTPATIENT
Start: 2025-01-21 | End: 2025-01-21

## 2025-01-21 RX ORDER — ACETAMINOPHEN 325 MG/1
650 TABLET ORAL ONCE
Status: COMPLETED | OUTPATIENT
Start: 2025-01-21 | End: 2025-01-21

## 2025-01-21 RX ORDER — KETOROLAC TROMETHAMINE 15 MG/ML
15 INJECTION, SOLUTION INTRAMUSCULAR; INTRAVENOUS ONCE
Status: COMPLETED | OUTPATIENT
Start: 2025-01-21 | End: 2025-01-21

## 2025-01-21 RX ORDER — LIDOCAINE 560 MG/1
1 PATCH PERCUTANEOUS; TOPICAL; TRANSDERMAL ONCE
Status: DISCONTINUED | OUTPATIENT
Start: 2025-01-21 | End: 2025-01-22 | Stop reason: HOSPADM

## 2025-01-21 RX ADMIN — LIDOCAINE 4% 1 PATCH: 40 PATCH TOPICAL at 19:46

## 2025-01-21 RX ADMIN — OXYCODONE HYDROCHLORIDE 5 MG: 5 TABLET ORAL at 21:27

## 2025-01-21 RX ADMIN — ACETAMINOPHEN 650 MG: 325 TABLET ORAL at 19:45

## 2025-01-21 RX ADMIN — ONDANSETRON 4 MG: 4 TABLET, ORALLY DISINTEGRATING ORAL at 21:14

## 2025-01-21 RX ADMIN — KETOROLAC TROMETHAMINE 15 MG: 15 INJECTION, SOLUTION INTRAMUSCULAR; INTRAVENOUS at 19:43

## 2025-01-21 ASSESSMENT — COLUMBIA-SUICIDE SEVERITY RATING SCALE - C-SSRS
6. HAVE YOU EVER DONE ANYTHING, STARTED TO DO ANYTHING, OR PREPARED TO DO ANYTHING TO END YOUR LIFE?: NO
2. HAVE YOU ACTUALLY HAD ANY THOUGHTS OF KILLING YOURSELF?: NO
1. IN THE PAST MONTH, HAVE YOU WISHED YOU WERE DEAD OR WISHED YOU COULD GO TO SLEEP AND NOT WAKE UP?: NO

## 2025-01-21 ASSESSMENT — LIFESTYLE VARIABLES
EVER FELT BAD OR GUILTY ABOUT YOUR DRINKING: NO
HAVE PEOPLE ANNOYED YOU BY CRITICIZING YOUR DRINKING: NO
HAVE YOU EVER FELT YOU SHOULD CUT DOWN ON YOUR DRINKING: NO
TOTAL SCORE: 0
EVER HAD A DRINK FIRST THING IN THE MORNING TO STEADY YOUR NERVES TO GET RID OF A HANGOVER: NO

## 2025-01-21 ASSESSMENT — PAIN SCALES - GENERAL
PAINLEVEL_OUTOF10: 8
PAINLEVEL_OUTOF10: 6

## 2025-01-21 ASSESSMENT — PAIN - FUNCTIONAL ASSESSMENT: PAIN_FUNCTIONAL_ASSESSMENT: 0-10

## 2025-01-21 ASSESSMENT — PAIN DESCRIPTION - ORIENTATION: ORIENTATION: LOWER

## 2025-01-21 ASSESSMENT — PAIN DESCRIPTION - LOCATION: LOCATION: BACK

## 2025-01-21 ASSESSMENT — PAIN DESCRIPTION - DESCRIPTORS: DESCRIPTORS: SHARP;ACHING

## 2025-01-21 NOTE — Clinical Note
Lanie Pablo was seen and treated in our emergency department on 1/21/2025.  She may return to work on 01/23/2025.       If you have any questions or concerns, please don't hesitate to call.      Austin Burrell MD

## 2025-01-21 NOTE — ED PROVIDER NOTES
EMERGENCY DEPARTMENT ENCOUNTER      Pt Name: Lanie Pablo  MRN: [id number]  Birthdate 1955  Date of evaluation: 1/21/2025  Provider: Yaneth Morrison MD    CHIEF COMPLAINT     Mechanical fall   Chief Complaint   Patient presents with    Fall     Pt states she tripped over ice landing on knees and hands around 1415, pt denies hitting her head, -thinners. Now experiencing lower back pain and stiff neck. Pt states she is concerned she may have broke hardware in her back         HISTORY OF PRESENT ILLNESS    69-year-old female with PMHx paroxysmal atrial fibrillation s/p ablation, multiple back surgeries.  She presented to [facility] ED after mechanical fall.  Patient fell today at home 14:15 p.m. she tripped on ice, patient fell on her face landed on her hands and knee. Patient states she has lower back pain 6-7/10 associated with bilateral leg pain.She denies loss of consciousness, dizziness, headache, nausea or vomiting.  Patient also denies urinary incontinence or bowel incontinence.  Patient reports stiff neck.  She has consern that she may broke one of the hardware at the back.  She is not on blood thinner  Patient denies smoking cigarettes and drinking alcohol.  Patient at home with her .    Nursing Notes were reviewed.    PAST MEDICAL HISTORY     Past Medical History:   Diagnosis Date    Personal history of other diseases of the circulatory system 08/13/2021    History of atrial fibrillation    Personal history of other diseases of the musculoskeletal system and connective tissue 08/13/2021    History of degenerative disc disease    Personal history of other diseases of the respiratory system 08/13/2021    History of bronchitis    Personal history of other endocrine, nutritional and metabolic disease     History of hypercholesterolemia         SURGICAL HISTORY       Past Surgical History:   Procedure Laterality Date    APPENDECTOMY  08/24/2017    Appendectomy    CERVICAL FUSION  08/24/2017    Cervical  Vertebral Fusion    CHOLECYSTECTOMY  08/24/2017    Cholecystectomy    COLON SURGERY  08/13/2021    Colon Surgery    LUMBAR LAMINECTOMY  08/24/2017    Laminectomy Lumbar    OTHER SURGICAL HISTORY  08/24/2017    Spinal Stereotaxis Stimulation Of Cord         CURRENT MEDICATIONS       Previous Medications    ACEBUTOLOL (SECTRAL) 200 MG CAPSULE    Take 1 capsule (200 mg) by mouth 2 times a day.    ALBUTEROL 2.5 MG /3 ML (0.083 %) NEBULIZER SOLUTION    Inhale 3 mL (2.5 mg) every 4 hours if needed.    ALBUTEROL 90 MCG/ACTUATION INHALER    INHALE 2 PUFFS BY MOUTH AS DIRECTED EVERY 4 HOURS AS NEEDED FOR WHEEZING AND FOR SHORTNESS OF BREATH    APIXABAN (ELIQUIS) 5 MG TABLET    Take 1 tablet (5 mg) by mouth twice a day.    BIOTIN 1 MG CAPSULE    Take 1 tablet by mouth once daily.    BREO ELLIPTA 100-25 MCG/DOSE INHALER    Inhale 1 puff once daily.    CELECOXIB (CELEBREX) 100 MG CAPSULE    Take 1 capsule (100 mg) by mouth 2 times a day as needed.    CHOLECALCIFEROL (VITAMIN D-3) 25 MCG (1000 UT) TABLET    Take 1 tablet (1,000 Units) by mouth once daily.    CITALOPRAM (CELEXA) 40 MG TABLET    Take 1 tablet (40 mg) by mouth once daily.    CYANOCOBALAMIN (VITAMIN B-12) 500 MCG TABLET    Take 1 tablet (500 mcg) by mouth once daily.    CYCLOBENZAPRINE (FLEXERIL) 10 MG TABLET    Take 1 tablet (10 mg) by mouth 2 times a day as needed.    DOCUSATE SODIUM (COLACE) 100 MG CAPSULE    Take 1 capsule (100 mg) by mouth 2 times a day as needed.    DOFETILIDE (TIKOSYN) 250 MCG CAPSULE    Take 1 capsule (250 mcg) by mouth twice a day.    FEROSUL TABLET    Take 1 tablet (325 mg) by mouth early in the morning..    FLECAINIDE (TAMBOCOR) 50 MG TABLET    Take 1 tablet (50 mg) by mouth once daily.    LIDOCAINE 4 % PATCH    Place 1 patch on the skin once daily.    MAGNESIUM OXIDE (MAG-OX) 200 MG SPLIT TABLET    Take 2 half tablet (400 mg) by mouth once daily.    METHOCARBAMOL (ROBAXIN) 500 MG TABLET    Take 1 tablet (500 mg) by mouth if needed for  muscle spasms.    MULTIVITAMIN TABLET    Take 1 tablet by mouth once daily.    PANTOPRAZOLE (PROTONIX) 40 MG EC TABLET    Take 1 tablet (40 mg) by mouth once daily in the morning. Take before meals.    POTASSIUM GLUCONATE 595 MG (99 MG) TABLET    Take 1 tablet by mouth once daily.    PREGABALIN (LYRICA) 100 MG CAPSULE    Take 1 capsule (100 mg) by mouth once daily.    SPIRONOLACTONE (ALDACTONE) 25 MG TABLET    Take 1 tablet (25 mg) by mouth once daily.    TRAMADOL (ULTRAM) 50 MG TABLET    Take 2 tablets (100 mg) by mouth every 8 hours if needed for moderate pain (4 - 6).    TRAZODONE (DESYREL) 50 MG TABLET    Take 1 tablet (50 mg) by mouth once daily at bedtime.       ALLERGIES     Advair diskus [fluticasone propion-salmeterol], Colchicine, Erythromycin, Hydroxychloroquine, Morphine, and Sulfa (sulfonamide antibiotics)    FAMILY HISTORY     No family history on file.       SOCIAL HISTORY       Social History     Socioeconomic History    Marital status:    Tobacco Use    Smoking status: Never     Passive exposure: Never    Smokeless tobacco: Never   Vaping Use    Vaping status: Never Used   Substance and Sexual Activity    Alcohol use: Not Currently    Drug use: Never     Social Drivers of Health     Financial Resource Strain: Low Risk  (12/3/2024)    Received from [facility]    Overall Financial Resource Strain (CARDIA)     Difficulty of Paying Living Expenses: Not very hard   Food Insecurity: No Food Insecurity (12/3/2024)    Received from [facility]    Hunger Vital Sign     Worried About Running Out of Food in the Last Year: Never true     Ran Out of Food in the Last Year: Never true   Transportation Needs: No Transportation Needs (12/3/2024)    Received from [facility]    PRAPARE - Transportation     Lack of Transportation (Medical): No     Lack of Transportation (Non-Medical): No   Physical Activity: Unknown (12/12/2023)    Received from [facility], [facility]    Exercise  Vital Sign     Days of Exercise per Week: 3 days     Minutes of Exercise per Session: Patient declined   Social Connections: Socially Integrated (12/12/2023)    Received from [facility], [facility]    Social Connection and Isolation Panel [NHANES]     Frequency of Communication with Friends and Family: Once a week     Frequency of Social Gatherings with Friends and Family: Twice a week     Attends Yazidi Services: More than 4 times per year     Attends Club or Organization Meetings: 1 to 4 times per year     Marital Status:    Housing Stability: Low Risk  (12/3/2024)    Received from [facility]    Housing Stability Vital Sign     Unable to Pay for Housing in the Last Year: No     Number of Times Moved in the Last Year: 0     Homeless in the Last Year: No       SCREENINGS                        PHYSICAL EXAM    (up to 7 for level 4, 8 or more for level 5)     ED Triage Vitals [01/21/25 1645]   Temperature Heart Rate Respirations BP   37.1 °C (98.8 °F) 57 18 166/77      Pulse Ox Temp Source Heart Rate Source Patient Position   98 % Temporal -- Sitting      BP Location FiO2 (%)     Left arm --       Physical Exam  Constitutional:       Appearance: Normal appearance.   HENT:      Head: Normocephalic and atraumatic.      Mouth/Throat:      Mouth: Mucous membranes are moist.   Cardiovascular:      Heart sounds: No murmur heard.     No gallop.   Pulmonary:      Effort: No respiratory distress.      Breath sounds: No wheezing or rales.   Abdominal:      General: There is no distension.      Palpations: Abdomen is soft.      Tenderness: There is no abdominal tenderness. There is no guarding.   Musculoskeletal:         General: Tenderness (Lower back at lumbar area) present.      Right lower leg: No edema.      Left lower leg: No edema.   Skin:     General: Skin is warm.   Neurological:      Mental Status: She is alert and oriented to person, place, and time.      Cranial Nerves: No cranial nerve  deficit.      Sensory: No sensory deficit.      Motor: No weakness.          DIAGNOSTIC RESULTS     LABS:  Labs Reviewed - No data to display    All other labs were within normal range or not returned as of this dictation.    Imaging  CT head wo IV contrast    (Results Pending)   CT cervical spine wo IV contrast    (Results Pending)   CT thoracic spine wo IV contrast    (Results Pending)   CT lumbar spine wo IV contrast    (Results Pending)        Procedures  Please see separate procedure documentation for further details.     EMERGENCY DEPARTMENT COURSE/MDM:   Medical Decision Making  69-year-old female with PMHx of paroxysmal atrial fibrillation, multiple back surgery.  She presented to [facility] ED after mechanical fall.  Patient fell forward landed on her hand and knees, she is not sure if she hit her face.  Patient denies feeling dizzy before fell down she states she just tripped on ice.  Patient lower back pain and bilateral leg pain.  She also reports neck stiffness.  She denies loss of consciousness, nausea or vomiting, urinary or bowel incontinence.  Per physical examination there is focal tenderness in the lower back at lumbar area.  No weakness and sensation intact in all 4 extremities.  Patient received Toradol, Tylenol and lidocaine patch for pain.CT head, cervical, thoracic, lumbar were ordered.  X-rays for wrists, knees and pelvis were also ordered.  CT head/cervical revealed: No acute intracranial abnormality or calvarial fracture.No acute fracture or traumatic malalignment of the cervical spine.Uncomplicated anterior and posterior cervical fusions of C4-C5. Spondylotic changes of the spine as detailed above.  CT thoracic spine revealed: No acute thoracic or lumbar vertebral body compression/fracture. Status post posterior fusion extending from T10-S1 and laminectomy spanning L3-L5.  No definite evidence of hardware complication or failure.  No significant change when compared with the prior lumbar spine  radiographs 11/6/2024.  CT lumbar spine revealed: No acute thoracic or lumbar vertebral body compression/fracture. Status post posterior fusion extending from T10-S1 and laminectomy spanning L3-L5.  No definite evidence of hardware complication or failure.  No significant change when compared with the prior lumbar spine radiographs 11/6/2024.  X-ray wrist reveals: Left wrist soft tissue swelling.  Cortical lucency along the articular surface of the distal left radius suggestive of nondisplaced fracture. Right wrist soft tissue swelling without acute fracture.  Pelvic x-ray reveals no acute pelvic fracture.  Knee x-ray reveals:No acute fracture or dislocation.  Mild osteoarthritis of left knee, moderate to severe osteoarthritis of right knee.  Patient received Tylenol, Toradol, lidocaine patch and oxycodone for pain.  She get left volar splint for nondisplaced left radius fracture.   Patient is stable, understands and agree with the management plan.  Discharged home in good condition          Please see ED course for additional MDM.    Diagnoses as of 01/21/25 2213   Fall, initial encounter   Nondisplaced fracture of distal end of left radius        CT head wo IV contrast    (Results Pending)   CT cervical spine wo IV contrast    (Results Pending)   CT thoracic spine wo IV contrast    (Results Pending)   CT lumbar spine wo IV contrast    (Results Pending)       Patient and or family in agreement and understanding of treatment plan.  All questions answered.      I reviewed the case with the attending ED physician. The attending ED physician agrees with the plan. Patient and/or patient´s representative was counseled regarding labs, imaging, likely diagnosis, and plan. All questions were answered.    ED Medications administered this visit:  Medications - No data to display    New Prescriptions from this visit:    New Prescriptions    No medications on file       Follow-up:  No follow-up provider specified.      Final  Impression: No diagnosis found.      (Please note that portions of this note were completed with a voice recognition program.  Efforts were made to edit the dictations but occasionally words are mis-transcribed.)       Yaneth Morrison MD  Resident  01/21/25 0135

## 2025-01-22 ENCOUNTER — OFFICE VISIT (OUTPATIENT)
Dept: ORTHOPEDIC SURGERY | Facility: CLINIC | Age: 70
End: 2025-01-22
Payer: COMMERCIAL

## 2025-01-22 DIAGNOSIS — S52.502A CLOSED FRACTURE OF DISTAL END OF LEFT RADIUS, UNSPECIFIED FRACTURE MORPHOLOGY, INITIAL ENCOUNTER: ICD-10-CM

## 2025-01-22 PROCEDURE — 99213 OFFICE O/P EST LOW 20 MIN: CPT | Mod: 57 | Performed by: INTERNAL MEDICINE

## 2025-01-22 PROCEDURE — 25600 CLTX DST RDL FX/EPHYS SEP WO: CPT | Performed by: INTERNAL MEDICINE

## 2025-01-22 PROCEDURE — 99203 OFFICE O/P NEW LOW 30 MIN: CPT | Performed by: INTERNAL MEDICINE

## 2025-01-22 PROCEDURE — L3908 WHO COCK-UP NONMOLDE PRE OTS: HCPCS | Performed by: INTERNAL MEDICINE

## 2025-01-22 NOTE — LETTER
January 22, 2025     Patient: Lanie Pablo   YOB: 1955   Date of Visit: 1/22/2025       To Whom It May Concern:    It is my medical opinion that Lanie Pablo may return to work on 01/27/25 and she may wear her brace as needed for the following 3 weeks .    If you have any questions or concerns, please don't hesitate to call.                 Lyndsey Doll MD

## 2025-01-22 NOTE — PROGRESS NOTES
Acute Injury New Patient Visit    CC:   Chief Complaint   Patient presents with    Left Knee - Pain       HPI: Lanie is a 69 y.o. female presents today for evaluation for acute left wrist injury after fall over the weekend.  Injured both her knees, and both her wrist, x-rays done at the emergency room and she was told she had a fracture in her left wrist.  Her right wrist is doing much better today.  Has no pain in her knees or hips.        Review of Systems   GENERAL: Negative for malaise, significant weight loss, fever  MUSCULOSKELETAL: See HPI  NEURO:  Negative for numbness / tingling     Past Medical History  Past Medical History:   Diagnosis Date    Personal history of other diseases of the circulatory system 08/13/2021    History of atrial fibrillation    Personal history of other diseases of the musculoskeletal system and connective tissue 08/13/2021    History of degenerative disc disease    Personal history of other diseases of the respiratory system 08/13/2021    History of bronchitis    Personal history of other endocrine, nutritional and metabolic disease     History of hypercholesterolemia       Medication review  Medication Documentation Review Audit       Reviewed by Kanwal Linder RN (Registered Nurse) on 01/21/25 at 1647      Medication Order Taking? Sig Documenting Provider Last Dose Status   acebutolol (Sectral) 200 mg capsule [id number]  Take 1 capsule (200 mg) by mouth 2 times a day.   Patient not taking: Reported on 11/6/2024    Historical Provider, MD  Active   albuterol 2.5 mg /3 mL (0.083 %) nebulizer solution [id number]  Inhale 3 mL (2.5 mg) every 4 hours if needed. Historical Provider, MD  Active   albuterol 90 mcg/actuation inhaler [id number]  INHALE 2 PUFFS BY MOUTH AS DIRECTED EVERY 4 HOURS AS NEEDED FOR WHEEZING AND FOR SHORTNESS OF BREATH Historical Provider, MD  Active   apixaban (Eliquis) 5 mg tablet [id number]  Take 1 tablet (5 mg) by mouth twice a day.   Patient not taking:  Reported on 11/6/2024    Historical MD Elijah  Active   biotin 1 mg capsule [id number]  Take 1 tablet by mouth once daily. Historical Provider, MD  Active   Breo Ellipta 100-25 mcg/dose inhaler [id number]  Inhale 1 puff once daily. Historical Provider, MD  Active   celecoxib (CeleBREX) 100 mg capsule [id number]  Take 1 capsule (100 mg) by mouth 2 times a day as needed. Historical MD Elijah  Active   cholecalciferol (Vitamin D-3) 25 MCG (1000 UT) tablet [id number]  Take 1 tablet (1,000 Units) by mouth once daily. Historical Provider, MD  Active   citalopram (CeleXA) 40 mg tablet [id number]  Take 1 tablet (40 mg) by mouth once daily. Historical Provider, MD  Active   cyanocobalamin (Vitamin B-12) 500 mcg tablet [id number]  Take 1 tablet (500 mcg) by mouth once daily. Historical MD Elijah  Active   cyclobenzaprine (Flexeril) 10 mg tablet [id number]  Take 1 tablet (10 mg) by mouth 2 times a day as needed. Historical Provider, MD  Active   docusate sodium (Colace) 100 mg capsule [id number]  Take 1 capsule (100 mg) by mouth 2 times a day as needed. Historical Provider, MD  Active   dofetilide (Tikosyn) 250 mcg capsule [id number]  Take 1 capsule (250 mcg) by mouth twice a day.   Patient not taking: Reported on 11/6/2024    Historical MD Elijah  Active   FeroSuL tablet [id number]  Take 1 tablet (325 mg) by mouth early in the morning.. Historical Provider, MD  Active   flecainide (Tambocor) 50 mg tablet [id number]  Take 1 tablet (50 mg) by mouth once daily. Historical Provider, MD  Active   lidocaine 4 % patch [id number]  Place 1 patch on the skin once daily. Historical Provider, MD  Active   magnesium oxide (Mag-Ox) 200 mg split tablet [id number]  Take 2 half tablet (400 mg) by mouth once daily. Historical Provider, MD  Active   methocarbamol (Robaxin) 500 mg tablet [id number]  Take 1 tablet (500 mg) by mouth if needed for muscle spasms. Historical MD Elijah  Active   multivitamin tablet [id number]  Take 1 tablet by mouth  once daily. Historical Provider, MD  Active   pantoprazole (ProtoNix) 40 mg EC tablet [id number]  Take 1 tablet (40 mg) by mouth once daily in the morning. Take before meals. Historical Provider, MD  Active   potassium gluconate 595 mg (99 mg) tablet [id number]  Take 1 tablet by mouth once daily. Historical Provider, MD  Active   pregabalin (Lyrica) 100 mg capsule [id number]  Take 1 capsule (100 mg) by mouth once daily.   Patient taking differently: Take 1 capsule (100 mg) by mouth if needed.    Historical Provider, MD  Active   spironolactone (Aldactone) 25 mg tablet [id number]  Take 1 tablet (25 mg) by mouth once daily.   Patient not taking: Reported on 11/6/2024    Historical Provider, MD  Active   traMADol (Ultram) 50 mg tablet [id number]  Take 2 tablets (100 mg) by mouth every 8 hours if needed for moderate pain (4 - 6). Historical Provider, MD  Active   traZODone (Desyrel) 50 mg tablet [id number]  Take 1 tablet (50 mg) by mouth once daily at bedtime. Historical Provider, MD  Active                    Allergies  Allergies   Allergen Reactions    Advair Diskus [Fluticasone Propion-Salmeterol] Angioedema    Colchicine Other    Erythromycin Rash    Hydroxychloroquine Diarrhea and Hives    Morphine GI Upset and Nausea/vomiting    Sulfa (Sulfonamide Antibiotics) Rash       Social History  Social History     Socioeconomic History    Marital status:      Spouse name: Not on file    Number of children: Not on file    Years of education: Not on file    Highest education level: Not on file   Occupational History    Not on file   Tobacco Use    Smoking status: Never     Passive exposure: Never    Smokeless tobacco: Never   Vaping Use    Vaping status: Never Used   Substance and Sexual Activity    Alcohol use: Not Currently    Drug use: Never    Sexual activity: Not on file   Other Topics Concern    Not on file   Social History Narrative    Not on file     Social Drivers of Health     Financial Resource Strain: Low Risk   (12/3/2024)    Received from [facility]    Overall Financial Resource Strain (CARDIA)     Difficulty of Paying Living Expenses: Not very hard   Food Insecurity: No Food Insecurity (12/3/2024)    Received from [facility]    Hunger Vital Sign     Worried About Running Out of Food in the Last Year: Never true     Ran Out of Food in the Last Year: Never true   Transportation Needs: No Transportation Needs (12/3/2024)    Received from [facility]    PRAPARE - Transportation     Lack of Transportation (Medical): No     Lack of Transportation (Non-Medical): No   Physical Activity: Unknown (12/12/2023)    [facility]    Exercise Vital Sign     Days of Exercise per Week: 3 days     Minutes of Exercise per Session: Patient declined   Stress: Not on file   Social Connections: Socially Integrated (12/12/2023)    [facility]    Social Connection and Isolation Panel [NHANES]     Frequency of Communication with Friends and Family: Once a week     Frequency of Social Gatherings with Friends and Family: Twice a week     Attends Zoroastrian Services: More than 4 times per year     Active Member of Clubs or Organizations: Not on file     Attends Club or Organization Meetings: 1 to 4 times per year     Marital Status:    Intimate Partner Violence: Not on file   Housing Stability: Low Risk  (12/3/2024)    Received from [facility]    Housing Stability Vital Sign     Unable to Pay for Housing in the Last Year: No     Number of Times Moved in the Last Year: 0     Homeless in the Last Year: No       Surgical History  Past Surgical History:   Procedure Laterality Date    APPENDECTOMY  08/24/2017    Appendectomy    CERVICAL FUSION  08/24/2017    Cervical Vertebral Fusion    CHOLECYSTECTOMY  08/24/2017    Cholecystectomy    COLON SURGERY  08/13/2021    Colon Surgery    LUMBAR LAMINECTOMY  08/24/2017    Laminectomy Lumbar    OTHER SURGICAL  HISTORY  08/24/2017    Spinal Stereotaxis Stimulation Of Cord       Physical Exam:  GENERAL:  Patient is awake, alert, and oriented to person place and time.  Patient appears well nourished and well kept.  Affect Calm, Not Acutely Distressed.  HEENT:  Normocephalic, Atraumatic, EOMI  CARDIOVASCULAR:  Hemodynamically stable.  RESPIRATORY:  Normal respirations with unlabored breathing.  Extremity: Left hand and wrist shows skin is intact.  Mild swelling.  Pain over the distal radius on palpation.  No pain over the distal ulna.  There is no pain in the scaphoid bone.  No pain over the metacarpal bones.  Her flexor and extensor mechanisms intact.  Mild pain over the CMC joint.  EPL tendons intact.  Right hand and wrist was examined for comparison.      Diagnostics: X-rays reviewed  XR pelvis 1-2 views  Narrative: STUDY:  Pelvis Radiographs; 1/21/2025 at 7:41 p.m.  INDICATION:  Fall on ice.  Pain on palpation.  COMPARISON:  None Available.  ACCESSION NUMBER(S):  [id number]  ORDERING CLINICIAN:  CRESENCIO DAMIAN  TECHNIQUE:  One view(s) of the pelvis.  FINDINGS:    The pelvic ring is intact.  There is no acute fracture.  Multilevel  fusion lower lumbar spine as well as fusion of the SI joints with  hardware.  Impression: No acute pelvic fracture visualized.  Signed by Darell Raygoza MD  XR knee 1-2 views bilateral  Narrative: STUDY:  Bilateral Knee Radiographs; 1/21/2025 at 7:41 p.m.  INDICATION:  Fall landing on knees.  Pain.  COMPARISON:  XR bilateral knees 6/5/2024.  ACCESSION NUMBER(S):  [id number]  ORDERING CLINICIAN:  CRESENCIO DAMIAN  TECHNIQUE:  Two view(s) of the right knee and two view(s) of the left  knee.  FINDINGS:    RIGHT KNEE:  There is no displaced fracture.  The alignment is  anatomic.  There is moderate to severe narrowing of the medial  compartment and moderate narrowing of the lateral compartment with  chondrocalcinosis.  There is narrowing of the patellofemoral  compartment with an enthesopathy noted  involving the superior aspect  of the patella which is stable.  No soft tissue abnormality is seen.   There is no joint effusion.  LEFT KNEE:  There is no displaced fracture.  There is narrowing of the  medial and patellofemoral compartments.  There is enthesopathy noted  involving the superior aspect of the patella.  The alignment is  anatomic.  No soft tissue abnormality is seen.  There is no joint  effusion.  Impression: 1.  No acute fracture or dislocation.  2.  Mild osteoarthritis of the left knee moderate to severe  osteoarthritis of the right knee as described above.  3.  No evidence of a joint effusion bilaterally..  Signed by Ernie Troncoso MD  XR wrist 3+ views bilateral  Narrative: STUDY:  Bilateral Wrist Radiographs; 1/21/2025 at 7:41 p.m.  INDICATION:  FOOSH injury.  Pain at bilateral thenar eminence.  No snuffbox  tenderness.  COMPARISON:  None Available.  ACCESSION NUMBER(S):  [id number]  ORDERING CLINICIAN:  CRESENCIO DAMAIN  TECHNIQUE:  Four view(s) of the right wrist and four view(s) of the  left wrist.  FINDINGS:    RIGHT WRIST: There is no displaced fracture.  The alignment is  anatomic.  No soft tissue abnormality is seen.  Mild degenerative  changes first carpometacarpal joint and articulation of the scaphoid  with trapezium.  LEFT WRIST: Lucency along the articular surface of the distal left  radius.  Chondrocalcinosis adjacent to the ulnar styloid.  The  alignment is anatomic.  Soft tissue swelling.  Degenerative changes  first carpometacarpal joint.  Impression: Left wrist soft tissue swelling.  Cortical lucency along the articular  surface of the distal left radius suggestive of nondisplaced fracture.  Right wrist soft tissue swelling without acute fracture.  Signed by Darell Raygoza MD  CT lumbar spine wo IV contrast  Narrative: STUDY:  CT Thoracic Spine, and Lumbar Spine without IV Contrast; 01/21/2025,  01/21/2025, 7:23 PM  INDICATION:  Fall, previous fixation throughout the thoracic and  lumbar spine.  COMPARISON:  CT lumbar 11/15/2024, XR Thoracic and lumbar 11/06/2024, XR lumbar  08/303/2023.  ACCESSION NUMBER(S):  [id number], [id number]  ORDERING CLINICIAN:  CRESENCIO DAMIAN  TECHNIQUE:  CT of the thoracic spine, and lumbar spine was performed  without intravenous or intrathecal contrast.  Sagittal and coronal  reconstructions were generated.    Automated mA/kV exposure control was utilized and patient examination  was performed in strict accordance with principles of ALARA.  FINDINGS:  Thoracic spine:  The alignment is anatomic.  There is no fracture or traumatic  subluxation.  The vertebral body heights are well maintained.  There is multilevel  degenerative disc disease of the thoracic spine with anterior  osteophytosis noted.  Posterior fusion hardware with pedicle screws  are seen extending from T10 into the lumbar spine.  No definite  evidence of hardware complication or failure at the visualized  levels..  No significant central canal stenosis is demonstrated.  The  neural foramina are patent throughout.  A neurostimulator is noted  within the spinal canal at the tip located at the T7 vertebral body.  The paravertebral soft tissues are within normal limits.  The  visualized chest and abdomen are unremarkable.  Coronary artery  calcifications and mitral annular calcifications are partially  visualized.  Lumbar spine:  The alignment is anatomic.  There is no fracture or traumatic  subluxation.  Posterior rods and pedicle screws are seen extending  from the level of T10-S1.  No definite evidence of hardware  complication or failure.  Status post laminectomy of L3-L5.  The vertebral body heights are well maintained.  There are multilevel  degenerative changes of the lumbar spine noted.  No definite  spondylolisthesis..  No significant central canal stenosis is  demonstrated.  The neural foramina are patent throughout.    The paravertebral soft tissues are within normal limits.   The  visualized abdomen is unremarkable.  There is evidence of prior  gastric surgery.  Fatty involution of the pancreas is noted.  There is  a probable left renal cyst.  Impression: 1.  No acute thoracic or lumbar vertebral body compression/fracture.  2.  Status post posterior fusion extending from T10-S1 and laminectomy  spanning L3-L5.  No definite evidence of hardware complication or  failure.  No significant change when compared with the prior lumbar  spine radiographs 11/6/2024.  3.  No definite evidence of central canal stenosis or significant  neuroforaminal narrowing.  4.  Multilevel degenerative disc disease of the thoracic and lumbar  spine.  5.  Other findings as stated above..  Signed by Ernie Troncoso MD  CT thoracic spine wo IV contrast  Narrative: STUDY:  CT Thoracic Spine, and Lumbar Spine without IV Contrast; 01/21/2025,  01/21/2025, 7:23 PM  INDICATION:  Fall, previous fixation throughout the thoracic and lumbar spine.  COMPARISON:  CT lumbar 11/15/2024, XR Thoracic and lumbar 11/06/2024, XR lumbar  08/303/2023.  ACCESSION NUMBER(S):  [id number], [id number]  ORDERING CLINICIAN:  CRESENCIO DAMIAN  TECHNIQUE:  CT of the thoracic spine, and lumbar spine was performed  without intravenous or intrathecal contrast.  Sagittal and coronal  reconstructions were generated.    Automated mA/kV exposure control was utilized and patient examination  was performed in strict accordance with principles of ALARA.  FINDINGS:  Thoracic spine:  The alignment is anatomic.  There is no fracture or traumatic  subluxation.  The vertebral body heights are well maintained.  There is multilevel  degenerative disc disease of the thoracic spine with anterior  osteophytosis noted.  Posterior fusion hardware with pedicle screws  are seen extending from T10 into the lumbar spine.  No definite  evidence of hardware complication or failure at the visualized  levels..  No significant central canal stenosis is demonstrated.   The  neural foramina are patent throughout.  A neurostimulator is noted  within the spinal canal at the tip located at the T7 vertebral body.  The paravertebral soft tissues are within normal limits.  The  visualized chest and abdomen are unremarkable.  Coronary artery  calcifications and mitral annular calcifications are partially  visualized.  Lumbar spine:  The alignment is anatomic.  There is no fracture or traumatic  subluxation.  Posterior rods and pedicle screws are seen extending  from the level of T10-S1.  No definite evidence of hardware  complication or failure.  Status post laminectomy of L3-L5.  The vertebral body heights are well maintained.  There are multilevel  degenerative changes of the lumbar spine noted.  No definite  spondylolisthesis..  No significant central canal stenosis is  demonstrated.  The neural foramina are patent throughout.    The paravertebral soft tissues are within normal limits.  The  visualized abdomen is unremarkable.  There is evidence of prior  gastric surgery.  Fatty involution of the pancreas is noted.  There is  a probable left renal cyst.  Impression: 1.  No acute thoracic or lumbar vertebral body compression/fracture.  2.  Status post posterior fusion extending from T10-S1 and laminectomy  spanning L3-L5.  No definite evidence of hardware complication or  failure.  No significant change when compared with the prior lumbar  spine radiographs 11/6/2024.  3.  No definite evidence of central canal stenosis or significant  neuroforaminal narrowing.  4.  Multilevel degenerative disc disease of the thoracic and lumbar  spine.  5.  Other findings as stated above..  Signed by Ernie Troncoso MD  CT head wo IV contrast, CT cervical spine wo IV contrast  Narrative: Interpreted By:  Sergey Mcdonald,   STUDY:  CT HEAD WO IV CONTRAST; CT CERVICAL SPINE WO IV CONTRAST;  1/21/2025  7:17 pm      INDICATION:  Signs/Symptoms:multiple falls          COMPARISON:  None.      ACCESSION  NUMBER(S):  [id number]; [id number]      ORDERING CLINICIAN:  ROBBI EVASN      TECHNIQUE:  Axial noncontrast CT images of head with coronal and sagittal  reconstructed images. Axial noncontrast CT images of the cervical  spine with coronal and sagittal reconstructed images.      FINDINGS:  CT HEAD:      BRAIN PARENCHYMA:  No acute intraparenchymal hemorrhage or  parenchymal evidence of acute large territory ischemic infarct.  Gray-white matter distinction is preserved. No mass-effect.      VENTRICLES and EXTRA-AXIAL SPACES:  No acute extra-axial or  intraventricular hemorrhage. No effacement of cerebral sulci. The  ventricles and sulci are age-concordant.      PARANASAL SINUSES/MASTOIDS:  No hemorrhage or air-fluid levels within  the visualized paranasal sinuses. Trace fluid in the mastoid air  cells.      CALVARIUM/ORBITS:  No skull fracture. The orbits and globes are  intact to the extent visualized.      EXTRACRANIAL SOFT TISSUES: No discernible hematoma.          CT CERVICAL SPINE:      PREVERTEBRAL SOFT TISSUES: Within normal limits.      CRANIOCERVICAL JUNCTION: Intact.      ALIGNMENT:  No traumatic malalignment or traumatic facet widening.      VERTEBRAE: Postsurgical changes of C4-C5 anterior cervical fusion and  posterior fusion. The hardware is intact without periprosthetic  lucency or fracture. There is solid ankylosis of the anterior and  posterior elements from C4-C5, C5-C6, C6-C7.      SPINAL CANAL/INTERVERTEBRAL DISCS: There is severe disc height loss  at C3-C4 and C7-T1 as well as in the upper thoracic spine. Vertebral  body heights are maintained. There is disc spur complex at C3-C4 and  C6-C7 resulting in mild canal stenosis.      NEURAL FORAMINA: Multilevel uncovertebral joint and facet arthropathy  notably contribute to severe bilateral C3-C4 foraminal stenosis, mild  left C4-C5 foraminal stenosis, mild right and moderate left C5-C6  foraminal stenosis, mild left C6-C7 foraminal  stenosis,  moderate-severe C7-T1 foraminal stenosis.      OTHER: None.      Impression: CT HEAD:  1. No acute intracranial abnormality or calvarial fracture.              CT CERVICAL SPINE:  1. No acute fracture or traumatic malalignment of the cervical spine.  2. Uncomplicated anterior and posterior cervical fusions of C4-C5.  3. Spondylotic changes of the spine as detailed above.      MACRO:  None.      Signed by: Sergey cMdonald 1/21/2025 8:04 PM  Dictation workstation:   [id number]      Procedure: None    Assessment:   1.  Left distal radius fracture  2.  Left CMC joint arthritis    Plan: Lanie presents today for evaluation for acute left wrist injury and sustained a nondisplaced distal radius fracture, we recommended nonsurgical treatment and placed into a short arm fracture brace, she may take off to shower, skin care and gentle passive range of motion.  She will follow-up in 3 weeks and repeat x-rays of the left wrist 3 views AP, lateral and oblique views.      No orders of the defined types were placed in this encounter.     At the conclusion of the visit there were no further questions by the patient/family regarding their plan of care.  Patient was instructed to call or return with any issues, questions, or concerns regarding their injury and/or treatment plan described above.     01/22/25 at 11:05 AM - Lyndsey Doll MD  Scribe Attestation  By signing my name below, I, Gerardo Simms, Scribe   attest that this documentation has been prepared under the direction and in the presence of Lyndsey Doll MD.    Office: [phone]    This note was prepared using voice recognition software.  The details of this note are correct and have been reviewed, and corrected to the best of my ability.  Some grammatical errors may persist related to the Dragon software.

## 2025-02-13 ENCOUNTER — OFFICE VISIT (OUTPATIENT)
Dept: ORTHOPEDIC SURGERY | Facility: CLINIC | Age: 70
End: 2025-02-13
Payer: COMMERCIAL

## 2025-02-13 ENCOUNTER — HOSPITAL ENCOUNTER (OUTPATIENT)
Dept: RADIOLOGY | Facility: CLINIC | Age: 70
Discharge: HOME | End: 2025-02-13
Payer: COMMERCIAL

## 2025-02-13 DIAGNOSIS — S52.502A CLOSED FRACTURE OF DISTAL END OF LEFT RADIUS, UNSPECIFIED FRACTURE MORPHOLOGY, INITIAL ENCOUNTER: ICD-10-CM

## 2025-02-13 DIAGNOSIS — S52.502A CLOSED FRACTURE OF DISTAL END OF LEFT RADIUS, UNSPECIFIED FRACTURE MORPHOLOGY, INITIAL ENCOUNTER: Primary | ICD-10-CM

## 2025-02-13 PROCEDURE — 99211 OFF/OP EST MAY X REQ PHY/QHP: CPT | Performed by: INTERNAL MEDICINE

## 2025-02-13 PROCEDURE — 73110 X-RAY EXAM OF WRIST: CPT | Mod: LT

## 2025-02-13 NOTE — LETTER
February 13, 2025     Patient: Lanie Pablo   YOB: 1955   Date of Visit: 2/13/2025       To Whom It May Concern:    It is my medical opinion that Lanie Pablo  may return to work on Monday, 02/17/25 with no restrictions .    If you have any questions or concerns, please don't hesitate to call.         Sincerely,          Lyndsey Doll MD    CC: Lynne Quigley MA

## 2025-02-13 NOTE — PROGRESS NOTES
CC:   No chief complaint on file.      HPI: Lanie is a 69 y.o. female presents today for reevaluation for work-related left distal radius fracture and left CMC joint arthritis. She states that she is doing better with left wrist. She notes some pain with the right wrist. Repeat x-rays today.  No new issues.        Review of Systems   GENERAL: Negative for malaise, significant weight loss, fever  MUSCULOSKELETAL: See HPI  NEURO:  Negative for numbness / tingling     Past Medical History  Past Medical History:   Diagnosis Date    Personal history of other diseases of the circulatory system 08/13/2021    History of atrial fibrillation    Personal history of other diseases of the musculoskeletal system and connective tissue 08/13/2021    History of degenerative disc disease    Personal history of other diseases of the respiratory system 08/13/2021    History of bronchitis    Personal history of other endocrine, nutritional and metabolic disease     History of hypercholesterolemia       Medication review  Medication Documentation Review Audit       Reviewed by Kanwal Linder RN (Registered Nurse) on 01/21/25 at 1647      Medication Order Taking? Sig Documenting Provider Last Dose Status   acebutolol (Sectral) 200 mg capsule [id number]  Take 1 capsule (200 mg) by mouth 2 times a day.   Patient not taking: Reported on 11/6/2024    Historical Provider, MD  Active   albuterol 2.5 mg /3 mL (0.083 %) nebulizer solution [id number]  Inhale 3 mL (2.5 mg) every 4 hours if needed. Historical Provider, MD  Active   albuterol 90 mcg/actuation inhaler [id number]  INHALE 2 PUFFS BY MOUTH AS DIRECTED EVERY 4 HOURS AS NEEDED FOR WHEEZING AND FOR SHORTNESS OF BREATH Historical Provider, MD  Active   apixaban (Eliquis) 5 mg tablet [id number]  Take 1 tablet (5 mg) by mouth twice a day.   Patient not taking: Reported on 11/6/2024    Historical Provider, MD  Active   biotin 1 mg capsule [id number]  Take 1 tablet by mouth once daily.  Historical MD Elijah  Active   Breo Ellipta 100-25 mcg/dose inhaler [id number]  Inhale 1 puff once daily. Historical MD Elijah  Active   celecoxib (CeleBREX) 100 mg capsule [id number]  Take 1 capsule (100 mg) by mouth 2 times a day as needed. Carla Nava MD  Active   cholecalciferol (Vitamin D-3) 25 MCG (1000 UT) tablet [id number]  Take 1 tablet (1,000 Units) by mouth once daily. Carla Nava MD  Active   citalopram (CeleXA) 40 mg tablet [id number]  Take 1 tablet (40 mg) by mouth once daily. Carla Nava MD  Active   cyanocobalamin (Vitamin B-12) 500 mcg tablet [id number]  Take 1 tablet (500 mcg) by mouth once daily. Carla Nava MD  Active   cyclobenzaprine (Flexeril) 10 mg tablet [id number]  Take 1 tablet (10 mg) by mouth 2 times a day as needed. Carla Nava MD  Active   docusate sodium (Colace) 100 mg capsule [id number]  Take 1 capsule (100 mg) by mouth 2 times a day as needed. Carla Nava MD  Active   dofetilide (Tikosyn) 250 mcg capsule [id number]  Take 1 capsule (250 mcg) by mouth twice a day.   Patient not taking: Reported on 11/6/2024    Carla Nava MD  Active   FeroSuL tablet [id number]  Take 1 tablet (325 mg) by mouth early in the morning.. Historical MD Elijah  Active   flecainide (Tambocor) 50 mg tablet [id number]  Take 1 tablet (50 mg) by mouth once daily. Historical MD Elijah  Active   lidocaine 4 % patch [id number]  Place 1 patch on the skin once daily. Historical MD Elijah  Active   magnesium oxide (Mag-Ox) 200 mg split tablet [id number]  Take 2 half tablet (400 mg) by mouth once daily. Carla Nava MD  Active   methocarbamol (Robaxin) 500 mg tablet [id number]  Take 1 tablet (500 mg) by mouth if needed for muscle spasms. Carla Nava MD  Active   multivitamin tablet [id number]  Take 1 tablet by mouth once daily. Carla Nava MD  Active   pantoprazole (ProtoNix) 40 mg EC tablet [id number]  Take 1 tablet (40 mg) by  mouth once daily in the morning. Take before meals. Historical Provider, MD  Active   potassium gluconate 595 mg (99 mg) tablet [id number]  Take 1 tablet by mouth once daily. Historical Provider, MD  Active   pregabalin (Lyrica) 100 mg capsule [id number]  Take 1 capsule (100 mg) by mouth once daily.   Patient taking differently: Take 1 capsule (100 mg) by mouth if needed.    Historical Provider, MD  Active   spironolactone (Aldactone) 25 mg tablet [id number]  Take 1 tablet (25 mg) by mouth once daily.   Patient not taking: Reported on 11/6/2024    Historical Provider, MD  Active   traMADol (Ultram) 50 mg tablet [id number]  Take 2 tablets (100 mg) by mouth every 8 hours if needed for moderate pain (4 - 6). Historical Provider, MD  Active   traZODone (Desyrel) 50 mg tablet [id number]  Take 1 tablet (50 mg) by mouth once daily at bedtime. Historical Provider, MD  Active                    Allergies  Allergies   Allergen Reactions    Advair Diskus [Fluticasone Propion-Salmeterol] Angioedema    Colchicine Other    Erythromycin Rash    Hydroxychloroquine Diarrhea and Hives    Morphine GI Upset and Nausea/vomiting    Sulfa (Sulfonamide Antibiotics) Rash       Social History  Social History     Socioeconomic History    Marital status:      Spouse name: Not on file    Number of children: Not on file    Years of education: Not on file    Highest education level: Not on file   Occupational History    Not on file   Tobacco Use    Smoking status: Never     Passive exposure: Never    Smokeless tobacco: Never   Vaping Use    Vaping status: Never Used   Substance and Sexual Activity    Alcohol use: Not Currently    Drug use: Never    Sexual activity: Not on file   Other Topics Concern    Not on file   Social History Narrative    Not on file     Social Drivers of Health     Financial Resource Strain: Low Risk  (12/3/2024)    Received from [facility]    Overall Financial Resource Strain (CARDIA)     Difficulty of Paying  Living Expenses: Not very hard   Food Insecurity: No Food Insecurity (12/3/2024)    Received from [facility]    Hunger Vital Sign     Worried About Running Out of Food in the Last Year: Never true     Ran Out of Food in the Last Year: Never true   Transportation Needs: No Transportation Needs (12/3/2024)    Received from [facility]    PRAPARE - Transportation     Lack of Transportation (Medical): No     Lack of Transportation (Non-Medical): No   Physical Activity: Unknown (12/12/2023)    Received from [facility], [facility]    Exercise Vital Sign     Days of Exercise per Week: 3 days     Minutes of Exercise per Session: Patient declined   Stress: Not on file   Social Connections: Socially Integrated (12/12/2023)    Received from [facility]    Social Connection and Isolation Panel [NHANES]     Frequency of Communication with Friends and Family: Once a week     Frequency of Social Gatherings with Friends and Family: Twice a week     Attends [employer] Services: More than 4 times per year     Active Member of Clubs or Organizations: Not on file     Attends Club or Organization Meetings: 1 to 4 times per year     Marital Status:    Intimate Partner Violence: Not on file   Housing Stability: Low Risk  (12/3/2024)    Received from [facility]    Housing Stability Vital Sign     Unable to Pay for Housing in the Last Year: No     Number of Times Moved in the Last Year: 0     Homeless in the Last Year: No       Surgical History  Past Surgical History:   Procedure Laterality Date    APPENDECTOMY  08/24/2017    Appendectomy    CERVICAL FUSION  08/24/2017    Cervical Vertebral Fusion    CHOLECYSTECTOMY  08/24/2017    Cholecystectomy    COLON SURGERY  08/13/2021    Colon Surgery    LUMBAR LAMINECTOMY  08/24/2017    Laminectomy Lumbar    OTHER SURGICAL HISTORY  08/24/2017    Spinal Stereotaxis Stimulation Of Cord       Physical Exam:  GENERAL:  Patient is awake, alert,  and oriented to person place and time.  Patient appears well nourished and well kept.  Affect Calm, Not Acutely Distressed.  HEENT:  Normocephalic, Atraumatic, EOMI  CARDIOVASCULAR:  Hemodynamically stable.  RESPIRATORY:  Normal respirations with unlabored breathing.  Extremity: Left hand and wrist shows skin is intact.  Resolved swelling.  No pain over the distal radius on palpation. No pain over the distal ulna. There is no pain in the scaphoid bone. No pain over the metacarpal bones. Her flexor and extensor mechanisms intact. Mild pain over the CMC joint. EPL tendons intact. Right hand and wrist was examined for comparison.     Right hand and wrist shows skin is intact.  There is no erythema warmth.  There is no clinical signs infection.  Mild pain over the radiocarpal joint.  There is no pain in the carpal bones.  No pain of the scaphoid bone.  No pain the distal radius or distal ulna.  Her flexor and extensor mechanisms intact.  Satisfactory capillary fill time.  EPL tendons intact.      Diagnostics: X-rays reviewed  XR pelvis 1-2 views  Narrative: STUDY:  Pelvis Radiographs; 1/21/2025 at 7:41 p.m.  INDICATION:  Fall on ice.  Pain on palpation.  COMPARISON:  None Available.  ACCESSION NUMBER(S):  [id number]  ORDERING CLINICIAN:  CRESENCIO DAMIAN  TECHNIQUE:  One view(s) of the pelvis.  FINDINGS:    The pelvic ring is intact.  There is no acute fracture.  Multilevel  fusion lower lumbar spine as well as fusion of the SI joints with  hardware.  Impression: No acute pelvic fracture visualized.  Signed by Darell Raygoza MD  XR knee 1-2 views bilateral  Narrative: STUDY:  Bilateral Knee Radiographs; 1/21/2025 at 7:41 p.m.  INDICATION:  Fall landing on knees.  Pain.  COMPARISON:  XR bilateral knees 6/5/2024.  ACCESSION NUMBER(S):  [id number]  ORDERING CLINICIAN:  CRESENCIO DAMIAN  TECHNIQUE:  Two view(s) of the right knee and two view(s) of the left  knee.  FINDINGS:    RIGHT KNEE:  There is no displaced fracture.   The alignment is  anatomic.  There is moderate to severe narrowing of the medial  compartment and moderate narrowing of the lateral compartment with  chondrocalcinosis.  There is narrowing of the patellofemoral  compartment with an enthesopathy noted involving the superior aspect  of the patella which is stable.  No soft tissue abnormality is seen.   There is no joint effusion.  LEFT KNEE:  There is no displaced fracture.  There is narrowing of the  medial and patellofemoral compartments.  There is enthesopathy noted  involving the superior aspect of the patella.  The alignment is  anatomic.  No soft tissue abnormality is seen.  There is no joint  effusion.  Impression: 1.  No acute fracture or dislocation.  2.  Mild osteoarthritis of the left knee moderate to severe  osteoarthritis of the right knee as described above.  3.  No evidence of a joint effusion bilaterally..  Signed by Ernie Troncoso MD  XR wrist 3+ views bilateral  Narrative: STUDY:  Bilateral Wrist Radiographs; 1/21/2025 at 7:41 p.m.  INDICATION:  FOOSH injury.  Pain at bilateral thenar eminence.  No snuffbox  tenderness.  COMPARISON:  None Available.  ACCESSION NUMBER(S):  [id number]  ORDERING CLINICIAN:  CRESENCIO DAMIAN  TECHNIQUE:  Four view(s) of the right wrist and four view(s) of the  left wrist.  FINDINGS:    RIGHT WRIST: There is no displaced fracture.  The alignment is  anatomic.  No soft tissue abnormality is seen.  Mild degenerative  changes first carpometacarpal joint and articulation of the scaphoid  with trapezium.  LEFT WRIST: Lucency along the articular surface of the distal left  radius.  Chondrocalcinosis adjacent to the ulnar styloid.  The  alignment is anatomic.  Soft tissue swelling.  Degenerative changes  first carpometacarpal joint.  Impression: Left wrist soft tissue swelling.  Cortical lucency along the articular  surface of the distal left radius suggestive of nondisplaced fracture.  Right wrist soft tissue swelling without  acute fracture.  Signed by Darell Raygoza MD  CT lumbar spine wo IV contrast  Narrative: STUDY:  CT Thoracic Spine, and Lumbar Spine without IV Contrast; 01/21/2025,  01/21/2025, 7:23 PM  INDICATION:  Fall, previous fixation throughout the thoracic and lumbar spine.  COMPARISON:  CT lumbar 11/15/2024, XR Thoracic and lumbar 11/06/2024, XR lumbar  08/303/2023.  ACCESSION NUMBER(S):  [id number], [id number]  ORDERING CLINICIAN:  CRESENCIO DAMIAN  TECHNIQUE:  CT of the thoracic spine, and lumbar spine was performed  without intravenous or intrathecal contrast.  Sagittal and coronal  reconstructions were generated.    Automated mA/kV exposure control was utilized and patient examination  was performed in strict accordance with principles of ALARA.  FINDINGS:  Thoracic spine:  The alignment is anatomic.  There is no fracture or traumatic  subluxation.  The vertebral body heights are well maintained.  There is multilevel  degenerative disc disease of the thoracic spine with anterior  osteophytosis noted.  Posterior fusion hardware with pedicle screws  are seen extending from T10 into the lumbar spine.  No definite  evidence of hardware complication or failure at the visualized  levels..  No significant central canal stenosis is demonstrated.  The  neural foramina are patent throughout.  A neurostimulator is noted  within the spinal canal at the tip located at the T7 vertebral body.  The paravertebral soft tissues are within normal limits.  The  visualized chest and abdomen are unremarkable.  Coronary artery  calcifications and mitral annular calcifications are partially  visualized.  Lumbar spine:  The alignment is anatomic.  There is no fracture or traumatic  subluxation.  Posterior rods and pedicle screws are seen extending  from the level of T10-S1.  No definite evidence of hardware  complication or failure.  Status post laminectomy of L3-L5.  The vertebral body heights are well maintained.  There are  multilevel  degenerative changes of the lumbar spine noted.  No definite  spondylolisthesis..  No significant central canal stenosis is  demonstrated.  The neural foramina are patent throughout.    The paravertebral soft tissues are within normal limits.  The  visualized abdomen is unremarkable.  There is evidence of prior  gastric surgery.  Fatty involution of the pancreas is noted.  There is  a probable left renal cyst.  Impression: 1.  No acute thoracic or lumbar vertebral body compression/fracture.  2.  Status post posterior fusion extending from T10-S1 and laminectomy  spanning L3-L5.  No definite evidence of hardware complication or  failure.  No significant change when compared with the prior lumbar  spine radiographs 11/6/2024.  3.  No definite evidence of central canal stenosis or significant  neuroforaminal narrowing.  4.  Multilevel degenerative disc disease of the thoracic and lumbar  spine.  5.  Other findings as stated above..  Signed by Ernie Troncoso MD  CT thoracic spine wo IV contrast  Narrative: STUDY:  CT Thoracic Spine, and Lumbar Spine without IV Contrast; 01/21/2025,  01/21/2025, 7:23 PM  INDICATION:  Fall, previous fixation throughout the thoracic and lumbar spine.  COMPARISON:  CT lumbar 11/15/2024, XR Thoracic and lumbar 11/06/2024, XR lumbar  08/303/2023.  ACCESSION NUMBER(S):  [id number], [id number]  ORDERING CLINICIAN:  CRESENCIO DAMIAN  TECHNIQUE:  CT of the thoracic spine, and lumbar spine was performed  without intravenous or intrathecal contrast.  Sagittal and coronal  reconstructions were generated.    Automated mA/kV exposure control was utilized and patient examination  was performed in strict accordance with principles of ALARA.  FINDINGS:  Thoracic spine:  The alignment is anatomic.  There is no fracture or traumatic  subluxation.  The vertebral body heights are well maintained.  There is multilevel  degenerative disc disease of the thoracic spine with anterior  osteophytosis  noted.  Posterior fusion hardware with pedicle screws  are seen extending from T10 into the lumbar spine.  No definite  evidence of hardware complication or failure at the visualized  levels..  No significant central canal stenosis is demonstrated.  The  neural foramina are patent throughout.  A neurostimulator is noted  within the spinal canal at the tip located at the T7 vertebral body.  The paravertebral soft tissues are within normal limits.  The  visualized chest and abdomen are unremarkable.  Coronary artery  calcifications and mitral annular calcifications are partially  visualized.  Lumbar spine:  The alignment is anatomic.  There is no fracture or traumatic  subluxation.  Posterior rods and pedicle screws are seen extending  from the level of T10-S1.  No definite evidence of hardware  complication or failure.  Status post laminectomy of L3-L5.  The vertebral body heights are well maintained.  There are multilevel  degenerative changes of the lumbar spine noted.  No definite  spondylolisthesis..  No significant central canal stenosis is  demonstrated.  The neural foramina are patent throughout.    The paravertebral soft tissues are within normal limits.  The  visualized abdomen is unremarkable.  There is evidence of prior  gastric surgery.  Fatty involution of the pancreas is noted.  There is  a probable left renal cyst.  Impression: 1.  No acute thoracic or lumbar vertebral body compression/fracture.  2.  Status post posterior fusion extending from T10-S1 and laminectomy  spanning L3-L5.  No definite evidence of hardware complication or  failure.  No significant change when compared with the prior lumbar  spine radiographs 11/6/2024.  3.  No definite evidence of central canal stenosis or significant  neuroforaminal narrowing.  4.  Multilevel degenerative disc disease of the thoracic and lumbar  spine.  5.  Other findings as stated above..  Signed by Ernie Troncoso MD  CT head wo IV contrast, CT cervical spine  wo IV contrast  Narrative: Interpreted By:  Sergey Mcdonald,   STUDY:  CT HEAD WO IV CONTRAST; CT CERVICAL SPINE WO IV CONTRAST;  1/21/2025  7:17 pm      INDICATION:  Signs/Symptoms:multiple falls          COMPARISON:  None.      ACCESSION NUMBER(S):  [id number]; [id number]      ORDERING CLINICIAN:  ROBBI EVANS      TECHNIQUE:  Axial noncontrast CT images of head with coronal and sagittal  reconstructed images. Axial noncontrast CT images of the cervical  spine with coronal and sagittal reconstructed images.      FINDINGS:  CT HEAD:      BRAIN PARENCHYMA:  No acute intraparenchymal hemorrhage or  parenchymal evidence of acute large territory ischemic infarct.  Gray-white matter distinction is preserved. No mass-effect.      VENTRICLES and EXTRA-AXIAL SPACES:  No acute extra-axial or  intraventricular hemorrhage. No effacement of cerebral sulci. The  ventricles and sulci are age-concordant.      PARANASAL SINUSES/MASTOIDS:  No hemorrhage or air-fluid levels within  the visualized paranasal sinuses. Trace fluid in the mastoid air  cells.      CALVARIUM/ORBITS:  No skull fracture. The orbits and globes are  intact to the extent visualized.      EXTRACRANIAL SOFT TISSUES: No discernible hematoma.          CT CERVICAL SPINE:      PREVERTEBRAL SOFT TISSUES: Within normal limits.      CRANIOCERVICAL JUNCTION: Intact.      ALIGNMENT:  No traumatic malalignment or traumatic facet widening.      VERTEBRAE: Postsurgical changes of C4-C5 anterior cervical fusion and  posterior fusion. The hardware is intact without periprosthetic  lucency or fracture. There is solid ankylosis of the anterior and  posterior elements from C4-C5, C5-C6, C6-C7.      SPINAL CANAL/INTERVERTEBRAL DISCS: There is severe disc height loss  at C3-C4 and C7-T1 as well as in the upper thoracic spine. Vertebral  body heights are maintained. There is disc spur complex at C3-C4 and  C6-C7 resulting in mild canal stenosis.      NEURAL FORAMINA: Multilevel  uncovertebral joint and facet arthropathy  notably contribute to severe bilateral C3-C4 foraminal stenosis, mild  left C4-C5 foraminal stenosis, mild right and moderate left C5-C6  foraminal stenosis, mild left C6-C7 foraminal stenosis,  moderate-severe C7-T1 foraminal stenosis.      OTHER: None.      Impression: CT HEAD:  1. No acute intracranial abnormality or calvarial fracture.              CT CERVICAL SPINE:  1. No acute fracture or traumatic malalignment of the cervical spine.  2. Uncomplicated anterior and posterior cervical fusions of C4-C5.  3. Spondylotic changes of the spine as detailed above.      MACRO:  None.      Signed by: Sergey Mcdonald 1/21/2025 8:04 PM  Dictation workstation:   [id number]        Procedure: None    Assessment:   1.  Left distal radius fracture  2.  Left CMC joint arthritis  3.  Right wrist sprain    Plan: Lanie presents today for reevaluation for work-related to the left and right wrist.  Repeat x-rays show satisfactory healing nondisplaced left distal radius fracture.  Will begin to wean her out of the fracture brace, gradual return to all activity as tolerated.  There are no clinical indications for any Occupational Therapy at this time.  Will have her return to work on February 17,2025 with no restrictions.  She may call for any work modifications.  If her pain worsens or persist, may consider occupational therapy or further advanced imaging.  She will call if she requires any further additional treatment.    No orders of the defined types were placed in this encounter.     At the conclusion of the visit there were no further questions by the patient/family regarding their plan of care.  Patient was instructed to call or return with any issues, questions, or concerns regarding their injury and/or treatment plan described above.     02/13/25 at 4:44 PM - Lyndsey Doll MD  Scribe Attestation  By signing my name below, IGerardo Scribe   attest that this  documentation has been prepared under the direction and in the presence of Lyndsey Doll MD.    Office: [phone]    This note was prepared using voice recognition software.  The details of this note are correct and have been reviewed, and corrected to the best of my ability.  Some grammatical errors may persist related to the Dragon software.

## 2025-02-16 ENCOUNTER — APPOINTMENT (OUTPATIENT)
Dept: CARDIOLOGY | Facility: HOSPITAL | Age: 70
End: 2025-02-16
Payer: MEDICARE

## 2025-02-16 ENCOUNTER — APPOINTMENT (OUTPATIENT)
Dept: RADIOLOGY | Facility: HOSPITAL | Age: 70
End: 2025-02-16
Payer: MEDICARE

## 2025-02-16 ENCOUNTER — HOSPITAL ENCOUNTER (EMERGENCY)
Facility: HOSPITAL | Age: 70
Discharge: HOME | End: 2025-02-16
Attending: EMERGENCY MEDICINE
Payer: MEDICARE

## 2025-02-16 VITALS
WEIGHT: 200 LBS | HEIGHT: 64 IN | OXYGEN SATURATION: 95 % | TEMPERATURE: 97.7 F | DIASTOLIC BLOOD PRESSURE: 72 MMHG | RESPIRATION RATE: 18 BRPM | BODY MASS INDEX: 34.15 KG/M2 | HEART RATE: 67 BPM | SYSTOLIC BLOOD PRESSURE: 146 MMHG

## 2025-02-16 DIAGNOSIS — M54.50 ACUTE EXACERBATION OF CHRONIC LOW BACK PAIN: ICD-10-CM

## 2025-02-16 DIAGNOSIS — G89.29 ACUTE EXACERBATION OF CHRONIC LOW BACK PAIN: ICD-10-CM

## 2025-02-16 DIAGNOSIS — J06.9 UPPER RESPIRATORY TRACT INFECTION, UNSPECIFIED TYPE: Primary | ICD-10-CM

## 2025-02-16 LAB
ALBUMIN SERPL BCP-MCNC: 4.3 G/DL (ref 3.4–5)
ALP SERPL-CCNC: 109 U/L (ref 33–136)
ALT SERPL W P-5'-P-CCNC: 59 U/L (ref 7–45)
ANION GAP SERPL CALC-SCNC: 13 MMOL/L (ref 10–20)
AST SERPL W P-5'-P-CCNC: 45 U/L (ref 9–39)
BASOPHILS # BLD AUTO: 0.04 X10*3/UL (ref 0–0.1)
BASOPHILS NFR BLD AUTO: 0.7 %
BILIRUB SERPL-MCNC: 0.9 MG/DL (ref 0–1.2)
BUN SERPL-MCNC: 13 MG/DL (ref 6–23)
CALCIUM SERPL-MCNC: 9.8 MG/DL (ref 8.6–10.3)
CARDIAC TROPONIN I PNL SERPL HS: 11 NG/L (ref 0–13)
CARDIAC TROPONIN I PNL SERPL HS: 11 NG/L (ref 0–13)
CHLORIDE SERPL-SCNC: 101 MMOL/L (ref 98–107)
CO2 SERPL-SCNC: 25 MMOL/L (ref 21–32)
CREAT SERPL-MCNC: 1.04 MG/DL (ref 0.5–1.05)
D DIMER PPP FEU-MCNC: 730 NG/ML FEU
EGFRCR SERPLBLD CKD-EPI 2021: 58 ML/MIN/1.73M*2
EOSINOPHIL # BLD AUTO: 0.11 X10*3/UL (ref 0–0.4)
EOSINOPHIL NFR BLD AUTO: 1.9 %
ERYTHROCYTE [DISTWIDTH] IN BLOOD BY AUTOMATED COUNT: 13.6 % (ref 11.5–14.5)
FLUAV RNA RESP QL NAA+PROBE: NOT DETECTED
FLUBV RNA RESP QL NAA+PROBE: NOT DETECTED
GLUCOSE SERPL-MCNC: 88 MG/DL (ref 74–99)
HCT VFR BLD AUTO: 42.9 % (ref 36–46)
HGB BLD-MCNC: 13.8 G/DL (ref 12–16)
IMM GRANULOCYTES # BLD AUTO: 0.02 X10*3/UL (ref 0–0.5)
IMM GRANULOCYTES NFR BLD AUTO: 0.3 % (ref 0–0.9)
INR PPP: 1.1 (ref 0.9–1.1)
LYMPHOCYTES # BLD AUTO: 1.37 X10*3/UL (ref 0.8–3)
LYMPHOCYTES NFR BLD AUTO: 23.4 %
MAGNESIUM SERPL-MCNC: 1.74 MG/DL (ref 1.6–2.4)
MCH RBC QN AUTO: 28.8 PG (ref 26–34)
MCHC RBC AUTO-ENTMCNC: 32.2 G/DL (ref 32–36)
MCV RBC AUTO: 89 FL (ref 80–100)
MONOCYTES # BLD AUTO: 0.54 X10*3/UL (ref 0.05–0.8)
MONOCYTES NFR BLD AUTO: 9.2 %
NEUTROPHILS # BLD AUTO: 3.77 X10*3/UL (ref 1.6–5.5)
NEUTROPHILS NFR BLD AUTO: 64.5 %
NRBC BLD-RTO: 0 /100 WBCS (ref 0–0)
PLATELET # BLD AUTO: 229 X10*3/UL (ref 150–450)
POTASSIUM SERPL-SCNC: 3.7 MMOL/L (ref 3.5–5.3)
PROT SERPL-MCNC: 6.9 G/DL (ref 6.4–8.2)
PROTHROMBIN TIME: 12.8 SECONDS (ref 9.8–12.8)
RBC # BLD AUTO: 4.8 X10*6/UL (ref 4–5.2)
RSV RNA RESP QL NAA+PROBE: NOT DETECTED
SARS-COV-2 RNA RESP QL NAA+PROBE: NOT DETECTED
SODIUM SERPL-SCNC: 135 MMOL/L (ref 136–145)
WBC # BLD AUTO: 5.9 X10*3/UL (ref 4.4–11.3)

## 2025-02-16 PROCEDURE — 85610 PROTHROMBIN TIME: CPT | Performed by: EMERGENCY MEDICINE

## 2025-02-16 PROCEDURE — 96375 TX/PRO/DX INJ NEW DRUG ADDON: CPT

## 2025-02-16 PROCEDURE — 85379 FIBRIN DEGRADATION QUANT: CPT | Performed by: EMERGENCY MEDICINE

## 2025-02-16 PROCEDURE — 36415 COLL VENOUS BLD VENIPUNCTURE: CPT | Performed by: EMERGENCY MEDICINE

## 2025-02-16 PROCEDURE — 84484 ASSAY OF TROPONIN QUANT: CPT | Performed by: EMERGENCY MEDICINE

## 2025-02-16 PROCEDURE — 84075 ASSAY ALKALINE PHOSPHATASE: CPT | Performed by: EMERGENCY MEDICINE

## 2025-02-16 PROCEDURE — 83735 ASSAY OF MAGNESIUM: CPT | Performed by: EMERGENCY MEDICINE

## 2025-02-16 PROCEDURE — 93005 ELECTROCARDIOGRAM TRACING: CPT

## 2025-02-16 PROCEDURE — 71046 X-RAY EXAM CHEST 2 VIEWS: CPT | Mod: FOREIGN READ | Performed by: RADIOLOGY

## 2025-02-16 PROCEDURE — 2500000001 HC RX 250 WO HCPCS SELF ADMINISTERED DRUGS (ALT 637 FOR MEDICARE OP): Performed by: EMERGENCY MEDICINE

## 2025-02-16 PROCEDURE — 96374 THER/PROPH/DIAG INJ IV PUSH: CPT

## 2025-02-16 PROCEDURE — 87636 SARSCOV2 & INF A&B AMP PRB: CPT | Performed by: EMERGENCY MEDICINE

## 2025-02-16 PROCEDURE — 71046 X-RAY EXAM CHEST 2 VIEWS: CPT

## 2025-02-16 PROCEDURE — 99285 EMERGENCY DEPT VISIT HI MDM: CPT | Mod: 25 | Performed by: EMERGENCY MEDICINE

## 2025-02-16 PROCEDURE — 2500000004 HC RX 250 GENERAL PHARMACY W/ HCPCS (ALT 636 FOR OP/ED): Performed by: EMERGENCY MEDICINE

## 2025-02-16 PROCEDURE — 85025 COMPLETE CBC W/AUTO DIFF WBC: CPT | Performed by: EMERGENCY MEDICINE

## 2025-02-16 RX ORDER — KETOROLAC TROMETHAMINE 15 MG/ML
15 INJECTION, SOLUTION INTRAMUSCULAR; INTRAVENOUS ONCE
Status: COMPLETED | OUTPATIENT
Start: 2025-02-16 | End: 2025-02-16

## 2025-02-16 RX ORDER — ACETAMINOPHEN 325 MG/1
975 TABLET ORAL ONCE
Status: COMPLETED | OUTPATIENT
Start: 2025-02-16 | End: 2025-02-16

## 2025-02-16 RX ORDER — PREDNISONE 50 MG/1
50 TABLET ORAL DAILY
Qty: 5 TABLET | Refills: 0 | Status: SHIPPED | OUTPATIENT
Start: 2025-02-16 | End: 2025-02-21

## 2025-02-16 RX ORDER — PREDNISONE 50 MG/1
50 TABLET ORAL ONCE
Status: COMPLETED | OUTPATIENT
Start: 2025-02-16 | End: 2025-02-16

## 2025-02-16 RX ADMIN — PREDNISONE 50 MG: 50 TABLET ORAL at 09:58

## 2025-02-16 RX ADMIN — HYDROMORPHONE HYDROCHLORIDE 0.5 MG: 1 INJECTION, SOLUTION INTRAMUSCULAR; INTRAVENOUS; SUBCUTANEOUS at 09:58

## 2025-02-16 RX ADMIN — ACETAMINOPHEN 975 MG: 325 TABLET ORAL at 09:58

## 2025-02-16 RX ADMIN — KETOROLAC TROMETHAMINE 15 MG: 15 INJECTION, SOLUTION INTRAMUSCULAR; INTRAVENOUS at 09:58

## 2025-02-16 ASSESSMENT — PAIN - FUNCTIONAL ASSESSMENT
PAIN_FUNCTIONAL_ASSESSMENT: 0-10
PAIN_FUNCTIONAL_ASSESSMENT: 0-10

## 2025-02-16 ASSESSMENT — PAIN DESCRIPTION - LOCATION: LOCATION: BACK

## 2025-02-16 ASSESSMENT — PAIN DESCRIPTION - DESCRIPTORS: DESCRIPTORS: ACHING

## 2025-02-16 ASSESSMENT — PAIN SCALES - GENERAL
PAINLEVEL_OUTOF10: 8
PAINLEVEL_OUTOF10: 9
PAINLEVEL_OUTOF10: 6

## 2025-02-16 ASSESSMENT — PAIN DESCRIPTION - ORIENTATION: ORIENTATION: LOWER

## 2025-02-16 NOTE — ED TRIAGE NOTES
Pt reports flu like symptoms and lower back pain persisting from a fall on 1/21. 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 + dry non-productive cough. Pt is concerned for pneumonia. Pt reports 9/10 back pin currently.

## 2025-02-16 NOTE — ED PROVIDER NOTES
EMERGENCY DEPARTMENT ENCOUNTER      Pt Name: Lanie Pablo  MRN: [id number]  Birthdate 1955  Date of evaluation: 2/16/2025  Provider: Taye Frank DO    CHIEF COMPLAINT       Chief Complaint   Patient presents with    Cough    Back Pain     Cough x 3 days and back pain, pt denies injury or fall, pt concerned for pna, pt denies cp or sob     HISTORY OF PRESENT ILLNESS    Lanie Pablo is a 69 y.o. year old female who presents to the ER for cough x 3-4 days, dry. Associated chills, low grade elevated temp at 99.9 measured by forehead. Has pain under her right breast with deep breaths. Her  wanted her to be checked for pneumonia, but she also does report acute on chronic back pain, starting from midthoracic radiating to both legs. No changes to urine or stool. Left leg subjectively intermittently weaker, but no new numbness or paresthesias. Fell on 01/21, had imaging at that time, was normal.  No further injuries since then. Usually walks hands free, has not needed to use any assist devices to walk.   Denies abdominal pain, nausea, vomiting, diarrhea, obstipation    Works as hospice RN, has been around many flu/RSV patients. Reports she gets pneumonia easily, this feels similar to prior PNA  Tried nyquil, dayquil with minimal improvement, tried albuterol inhaler    For her back pain has been taking tramadol without relief.    A few weeks ago had injection for her pinched nerve, follows with Dr. Francisco.     PMH asthma, AF now off of eliquis, chronic back pain, intestinal malrotation  PSH cardiac ablation, 6 back surgeries, appy, jenifer, hyster, 8 abdominal surgeries (4 for obstruction, last 30 years ago)  Denies tobacco, alcohol, drug use     PAST MEDICAL HISTORY     Past Medical History:   Diagnosis Date    Personal history of other diseases of the circulatory system 08/13/2021    History of atrial fibrillation    Personal history of other diseases of the musculoskeletal system and connective tissue  08/13/2021    History of degenerative disc disease    Personal history of other diseases of the respiratory system 08/13/2021    History of bronchitis    Personal history of other endocrine, nutritional and metabolic disease     History of hypercholesterolemia     CURRENT MEDICATIONS       Discharge Medication List as of 2/16/2025  1:25 PM        CONTINUE these medications which have NOT CHANGED    Details   acebutolol (Sectral) 200 mg capsule Take 1 capsule (200 mg) by mouth 2 times a day., Starting Wed 10/14/2020, Historical Med      albuterol 2.5 mg /3 mL (0.083 %) nebulizer solution Inhale 3 mL (2.5 mg) every 4 hours if needed., Starting Thu 4/11/2024, Historical Med      albuterol 90 mcg/actuation inhaler INHALE 2 PUFFS BY MOUTH AS DIRECTED EVERY 4 HOURS AS NEEDED FOR WHEEZING AND FOR SHORTNESS OF BREATH, Historical Med      apixaban (Eliquis) 5 mg tablet Take 1 tablet (5 mg) by mouth twice a day., Starting Fri 8/18/2023, Historical Med      biotin 1 mg capsule Take 1 tablet by mouth once daily., Historical Med      Breo Ellipta 100-25 mcg/dose inhaler Inhale 1 puff once daily., Historical Med      celecoxib (CeleBREX) 100 mg capsule Take 1 capsule (100 mg) by mouth 2 times a day as needed., Historical Med      cholecalciferol (Vitamin D-3) 25 MCG (1000 UT) tablet Take 1 tablet (1,000 Units) by mouth once daily., Historical Med      citalopram (CeleXA) 40 mg tablet Take 1 tablet (40 mg) by mouth once daily., Historical Med      cyanocobalamin (Vitamin B-12) 500 mcg tablet Take 1 tablet (500 mcg) by mouth once daily., Starting Mon 2/26/2024, Historical Med      cyclobenzaprine (Flexeril) 10 mg tablet Take 1 tablet (10 mg) by mouth 2 times a day as needed., Starting Wed 9/23/2020, Historical Med      docusate sodium (Colace) 100 mg capsule Take 1 capsule (100 mg) by mouth 2 times a day as needed., Historical Med      dofetilide (Tikosyn) 250 mcg capsule Take 1 capsule (250 mcg) by mouth twice a day., Starting Wed  5/17/2023, Historical Med      FeroSuL tablet Take 1 tablet (325 mg) by mouth early in the morning.., Starting Thu 1/11/2024, Historical Med      flecainide (Tambocor) 50 mg tablet Take 1 tablet (50 mg) by mouth once daily., Historical Med      lidocaine 4 % patch Place 1 patch on the skin once daily., Starting Fri 7/19/2024, Historical Med      magnesium oxide (Mag-Ox) 200 mg split tablet Take 2 half tablet (400 mg) by mouth once daily., Historical Med      methocarbamol (Robaxin) 500 mg tablet Take 1 tablet (500 mg) by mouth if needed for muscle spasms., Starting Wed 3/13/2024, Historical Med      multivitamin tablet Take 1 tablet by mouth once daily., Starting Thu 1/23/2003, Historical Med      pantoprazole (ProtoNix) 40 mg EC tablet Take 1 tablet (40 mg) by mouth once daily in the morning. Take before meals., Starting Wed 8/26/2020, Historical Med      potassium gluconate 595 mg (99 mg) tablet Take 1 tablet by mouth once daily., Historical Med      pregabalin (Lyrica) 100 mg capsule Take 1 capsule (100 mg) by mouth once daily., Starting Thu 3/16/2023, Historical Med      spironolactone (Aldactone) 25 mg tablet Take 1 tablet (25 mg) by mouth once daily., Starting Fri 8/18/2023, Historical Med      traMADol (Ultram) 50 mg tablet Take 2 tablets (100 mg) by mouth every 8 hours if needed for moderate pain (4 - 6)., Starting Tue 8/9/2022, Historical Med      traZODone (Desyrel) 50 mg tablet Take 1 tablet (50 mg) by mouth once daily at bedtime., Historical Med           SURGICAL HISTORY       Past Surgical History:   Procedure Laterality Date    APPENDECTOMY  08/24/2017    Appendectomy    CERVICAL FUSION  08/24/2017    Cervical Vertebral Fusion    CHOLECYSTECTOMY  08/24/2017    Cholecystectomy    COLON SURGERY  08/13/2021    Colon Surgery    LUMBAR LAMINECTOMY  08/24/2017    Laminectomy Lumbar    OTHER SURGICAL HISTORY  08/24/2017    Spinal Stereotaxis Stimulation Of Cord     ALLERGIES     Colchicine, Fluticasone  propion-salmeterol, Erythromycin, Hydroxychloroquine, Morphine, Nickel, and Sulfa (sulfonamide antibiotics)  FAMILY HISTORY     No family history on file.  SOCIAL HISTORY       Social History     Tobacco Use    Smoking status: Never     Passive exposure: Never    Smokeless tobacco: Never   Vaping Use    Vaping status: Never Used   Substance Use Topics    Alcohol use: Not Currently    Drug use: Never     PHYSICAL EXAM  (up to 7 for level 4, 8 or more for level 5)     ED Triage Vitals [02/16/25 0812]   Temperature Heart Rate Respirations BP   36.5 °C (97.7 °F) 85 18 120/82      Pulse Ox Temp Source Heart Rate Source Patient Position   96 % Temporal Monitor Sitting      BP Location FiO2 (%)     Right arm --       Physical Exam  Vitals and nursing note reviewed.   Constitutional:       General: She is not in acute distress.     Appearance: Normal appearance. She is not ill-appearing.   HENT:      Head: Normocephalic and atraumatic. No raccoon eyes, Barahona's sign, contusion or laceration.      Jaw: No trismus or malocclusion.      Right Ear: External ear normal.      Left Ear: External ear normal.      Mouth/Throat:      Mouth: Mucous membranes are moist.      Pharynx: Oropharynx is clear.   Eyes:      Extraocular Movements: Extraocular movements intact.      Pupils: Pupils are equal, round, and reactive to light.   Neck:      Trachea: No tracheal deviation.   Cardiovascular:      Rate and Rhythm: Normal rate and regular rhythm.      Pulses: Normal pulses.   Pulmonary:      Effort: No respiratory distress.      Breath sounds: No wheezing, rhonchi or rales.   Chest:      Chest wall: No tenderness.   Abdominal:      General: Abdomen is flat.      Palpations: Abdomen is soft. There is no mass.      Tenderness: There is no abdominal tenderness.   Musculoskeletal:         General: No tenderness or signs of injury.      Right lower leg: No edema.      Left lower leg: No edema.   Skin:     Coloration: Skin is not jaundiced or  pale.      Findings: No petechiae, rash or wound.   Neurological:      Mental Status: She is alert.      Comments: Straight leg raise positive bilaterally        DIAGNOSTIC RESULTS   LABS:  Labs Reviewed   COMPREHENSIVE METABOLIC PANEL - Abnormal       Result Value    Glucose 88      Sodium 135 (*)     Potassium 3.7      Chloride 101      Bicarbonate 25      Anion Gap 13      Urea Nitrogen 13      Creatinine 1.04      eGFR 58 (*)     Calcium 9.8      Albumin 4.3      Alkaline Phosphatase 109      Total Protein 6.9      AST 45 (*)     Bilirubin, Total 0.9      ALT 59 (*)    D-DIMER, VTE EXCLUSION - Abnormal    D-Dimer, Quantitative VTE Exclusion 730 (*)     Narrative:     The VTE Exclusion D-Dimer assay is reported in ng/mL Fibrinogen Equivalent Units (FEU).    Per 's instructions for use, a value of less than 500 ng/mL (FEU) may help to exclude DVT or PE in outpatients when the assay is used with a clinical pretest probability assessment.([facility] must utilize and document eCalc 'Wells Score Deep Vein Thrombosis Risk' for DVT exclusion only. Emergency Department should utilize  Guidelines for Emergency Department Use of the VTE Exclusion D-Dimer and Clinical Pretest probability assessment model for DVT or PE exclusion.)   SARS-COV-2 AND INFLUENZA A/B PCR - Normal    Flu A Result Not Detected      Flu B Result Not Detected      Coronavirus 2019, PCR Not Detected      Narrative:     This assay is an FDA-cleared, in vitro diagnostic nucleic acid amplification test for the qualitative detection and differentiation of SARS CoV-2/ Influenza A/B from nasopharyngeal specimens collected from individuals with signs and symptoms of respiratory tract infections, and has been validated for use at [facility]. Negative results do not preclude COVID-19/ Influenza A/B infections and should not be used as the sole basis for diagnosis, treatment, or other management decisions. Testing for SARS CoV-2  is recommended only for patients who meet current clinical and/or epidemiological criteria defined by federal, state, or local public health directives.   RSV PCR - Normal    RSV PCR Not Detected      Narrative:     This assay is an FDA-cleared, in vitro diagnostic nucleic acid amplification test for the detection of RSV from nasopharyngeal specimens, and has been validated for use at [facility]. Negative results do not preclude RSV infections, and should not be used as the sole basis for diagnosis, treatment, or other management decisions. If Influenza A/B and RSV PCR results are negative, testing for Parainfluenza virus, Adenovirus and Metapneumovirus is routinely performed for pediatric oncology and intensive care inpatients at [facility], and is available on other patients by placing an add-on request.       MAGNESIUM - Normal    Magnesium 1.74     PROTIME-INR - Normal    Protime 12.8      INR 1.1     SERIAL TROPONIN-INITIAL - Normal    Troponin I, High Sensitivity 11      Narrative:     Less than 99th percentile of normal range cutoff-  Female and children under 18 years old <14 ng/L; Male <21 ng/L: Negative  Repeat testing should be performed if clinically indicated.     Female and children under 18 years old 14-50 ng/L; Male 21-50 ng/L:  Consistent with possible cardiac damage and possible increased clinical   risk. Serial measurements may help to assess extent of myocardial damage.     >50 ng/L: Consistent with cardiac damage, increased clinical risk and  myocardial infarction. Serial measurements may help assess extent of   myocardial damage.      NOTE: Children less than 1 year old may have higher baseline troponin   levels and results should be interpreted in conjunction with the overall   clinical context.     NOTE: Troponin I testing is performed using a different   testing methodology at [facility] than at other   [facility]. Direct result comparisons should only    be made within the same method.   SERIAL TROPONIN, 1 HOUR - Normal    Troponin I, High Sensitivity 11      Narrative:     Less than 99th percentile of normal range cutoff-  Female and children under 18 years old <14 ng/L; Male <21 ng/L: Negative  Repeat testing should be performed if clinically indicated.     Female and children under 18 years old 14-50 ng/L; Male 21-50 ng/L:  Consistent with possible cardiac damage and possible increased clinical   risk. Serial measurements may help to assess extent of myocardial damage.     >50 ng/L: Consistent with cardiac damage, increased clinical risk and  myocardial infarction. Serial measurements may help assess extent of   myocardial damage.      NOTE: Children less than 1 year old may have higher baseline troponin   levels and results should be interpreted in conjunction with the overall   clinical context.     NOTE: Troponin I testing is performed using a different   testing methodology at [facility] than at other   [facility]. Direct result comparisons should only   be made within the same method.   TROPONIN SERIES- (INITIAL, 1 HR)    Narrative:     The following orders were created for panel order Troponin I Series, High Sensitivity (0, 1 HR).  Procedure                               Abnormality         Status                     ---------                               -----------         ------                     Troponin I, High Sensiti...[[id number]]  Normal              Final result               Troponin, High Sensitivi...[[id number]]  Normal              Final result                 Please view results for these tests on the individual orders.   CBC WITH AUTO DIFFERENTIAL    WBC 5.9      nRBC 0.0      RBC 4.80      Hemoglobin 13.8      Hematocrit 42.9      MCV 89      MCH 28.8      MCHC 32.2      RDW 13.6      Platelets 229      Neutrophils % 64.5      Immature Granulocytes %, Automated 0.3      Lymphocytes % 23.4      Monocytes % 9.2       Eosinophils % 1.9      Basophils % 0.7      Neutrophils Absolute 3.77      Immature Granulocytes Absolute, Automated 0.02      Lymphocytes Absolute 1.37      Monocytes Absolute 0.54      Eosinophils Absolute 0.11      Basophils Absolute 0.04       All other labs were within normal range or not returned as of this dictation.  Imaging  XR chest 2 views   Final Result   No evidence consolidating infiltrate or effusion.   Signed by Uday Saleh MD         Procedure  Procedures  EMERGENCY DEPARTMENT COURSE/MDM:   Medical Decision Making    Vitals:    Vitals:    02/16/25 1000 02/16/25 1200 02/16/25 1215 02/16/25 1329   BP: 128/79 121/76  146/72   BP Location:    Right arm   Patient Position:    Lying   Pulse: 65 64 66 67   Resp: (!) 26 (!) 21 (!) 22 18   Temp:       TempSrc:       SpO2: 99% 95% 94% 95%   Weight:       Height:         Lanie Pablo is a female 69 y.o. who presents to the ER for dry cough, right lower chest pain, acute on back pain with radiation to her bilateral legs. On arrival the patients vital signs were: Afebrile, regular heart rate, normotensive, regular respiration rate, normoxic on room air. History obtained from: patient.  Given chest pain and 69-year-old female plan for cardiac workup with viral swabs, unable to utilize PERC rule will also obtain D-dimer.  No  incontinence, GI incontinence, weakness, numbness or tingling in her legs, no new trauma, no new midline CT or L-spine tenderness. No concern for osteomyelitis, discitis, epidural abscess, cord compression, fracture, or cauda equina.  I do not believe they require MRI or CT imaging at this time.  Will provide acetaminophen, Toradol, Dilaudid, prednisone for analgesia and antiinflammation.    ED Course as of 02/17/25 1053   Sun Feb 16, 2025   1014 CBC and Auto Differential  No acute leukocytosis, leukopenia, thrombocytosis, thrombocytopenia, or anemia [CB]   1132 D-dimer elevated at 730.  PE ruled out by years criteria. [JJ]   1145  Troponin I, High Sensitivity: 11  Not elevated doubt acs or myopericarditis [CB]   1146 Coronavirus 2019, PCR: Not Detected [CB]   1146 Flu B Result: Not Detected [CB]   1146 Flu A Result: Not Detected [CB]   1146 Comprehensive Metabolic Panel(!)  Normoglycemic, no acute electrolyte or hepatorenal abnormality [CB]   1146 XR chest 2 views  No evidence consolidating infiltrate or effusion. [CB]   1309 Troponin I, High Sensitivity: 11  Not elevated up to 15 points relative to initial troponin, doubt doubt acs or myopericarditis [CB]   1309 Passed road test [CB]      ED Course User Index  [CB] Taye Frank DO  [JJ] Aguila Stevenson DO         Diagnoses as of 02/17/25 1053   Upper respiratory tract infection, unspecified type   Acute exacerbation of chronic low back pain       External Records Reviewed: I reviewed recent and relevant outside records including inpatient notes, outpatient records  Prescription Drug Consideration: Prednisone 50mg x 5 days prescribed for back pain exacerbation    Shared decision making for disposition  Patient and/or patient´s representative was counseled regarding labs, imaging, likely diagnosis. All questions were answered. Recommendation was made   for discharge home. The patient agreed and was discharged home in stable condition with appropriate relevant educational materials. Return precautions were provided which included increasing pain, numbness, tingling, weakness, loss of motion in your arms or legs, loss of control of your urine or stool, fever, abdominal pain, chest pain, shortness of breath, or any new or worsening symptoms..     ED Medications administered this visit:    Medications   predniSONE (Deltasone) tablet 50 mg (50 mg oral Given 2/16/25 0958)   HYDROmorphone (Dilaudid) injection 0.5 mg (0.5 mg intravenous Given 2/16/25 0958)   acetaminophen (Tylenol) tablet 975 mg (975 mg oral Given 2/16/25 0958)   ketorolac (Toradol) injection 15 mg (15 mg intravenous Given 2/16/25  0958)       New Prescriptions from this visit:    Discharge Medication List as of 2/16/2025  1:25 PM        START taking these medications    Details   predniSONE (Deltasone) 50 mg tablet Take 1 tablet (50 mg) by mouth once daily for 5 days., Starting Sun 2/16/2025, Until Fri 2/21/2025, Normal             Follow-up:  Rafal Francisco MD  [address] [facility] [place], [facility]  [address]  [place] [address]  [phone]    Schedule an appointment as soon as possible for a visit       Mike Quigley MD  [address] [place] [address] [address]  [place] [id number]  [phone]              Final Impression:   1. Upper respiratory tract infection, unspecified type    2. Acute exacerbation of chronic low back pain          Please excuse any misspellings or unintended errors related to the Dragon speech recognition software used to dictate this note.    I reviewed the case with the attending ED physician. The attending ED physician agrees with the plan.      Taye Frank, DO  Resident  02/17/25 1058

## 2025-02-16 NOTE — DISCHARGE INSTRUCTIONS
Please return to the ER or seek immediate medical attention if you experience increasing pain, numbness, tingling, weakness, loss of motion in your arms or legs, loss of control of your urine or stool, fever, abdominal pain, chest pain, shortness of breath, or any new or worsening symptoms.    You are welcome back any time. Thank you for entrusting your care to us, I hope we made your visit as pleasant as possible. Wishing you well!    Dr. Frank

## 2025-02-17 LAB
ATRIAL RATE: 65 BPM
ATRIAL RATE: 67 BPM
P AXIS: -13 DEGREES
P AXIS: 71 DEGREES
P OFFSET: 195 MS
P OFFSET: 197 MS
P ONSET: 132 MS
P ONSET: 144 MS
PR INTERVAL: 148 MS
PR INTERVAL: 166 MS
Q ONSET: 215 MS
Q ONSET: 218 MS
QRS COUNT: 11 BEATS
QRS COUNT: 11 BEATS
QRS DURATION: 100 MS
QRS DURATION: 108 MS
QT INTERVAL: 426 MS
QT INTERVAL: 438 MS
QTC CALCULATION(BAZETT): 450 MS
QTC CALCULATION(BAZETT): 455 MS
QTC FREDERICIA: 442 MS
QTC FREDERICIA: 449 MS
R AXIS: -28 DEGREES
R AXIS: -30 DEGREES
T AXIS: 23 DEGREES
T AXIS: 58 DEGREES
T OFFSET: 431 MS
T OFFSET: 434 MS
VENTRICULAR RATE: 65 BPM
VENTRICULAR RATE: 67 BPM

## 2025-03-12 ENCOUNTER — OFFICE VISIT (OUTPATIENT)
Dept: NEUROSURGERY | Facility: CLINIC | Age: 70
End: 2025-03-12
Payer: MEDICARE

## 2025-03-12 VITALS — BODY MASS INDEX: 34.33 KG/M2 | HEIGHT: 64 IN

## 2025-03-12 DIAGNOSIS — G89.29 CHRONIC BILATERAL LOW BACK PAIN WITH BILATERAL SCIATICA: ICD-10-CM

## 2025-03-12 DIAGNOSIS — M54.42 CHRONIC BILATERAL LOW BACK PAIN WITH BILATERAL SCIATICA: ICD-10-CM

## 2025-03-12 DIAGNOSIS — M54.6 CHRONIC MIDLINE THORACIC BACK PAIN: Primary | ICD-10-CM

## 2025-03-12 DIAGNOSIS — M54.41 CHRONIC BILATERAL LOW BACK PAIN WITH BILATERAL SCIATICA: ICD-10-CM

## 2025-03-12 DIAGNOSIS — G89.29 CHRONIC MIDLINE THORACIC BACK PAIN: Primary | ICD-10-CM

## 2025-03-12 PROCEDURE — 1125F AMNT PAIN NOTED PAIN PRSNT: CPT | Performed by: PHYSICIAN ASSISTANT

## 2025-03-12 PROCEDURE — 99214 OFFICE O/P EST MOD 30 MIN: CPT | Performed by: PHYSICIAN ASSISTANT

## 2025-03-12 ASSESSMENT — PAIN SCALES - GENERAL: PAINLEVEL_OUTOF10: 8

## 2025-03-12 NOTE — PROGRESS NOTES
[facility] [place]  Department of Neurological Surgery  Established Patient Visit    History of Present Illness  Lanie Pablo is a 69 y.o. year old female who presents to the spine clinic in follow up with continued mid thoracic back pain radiating bilaterally to her shoulder blades however has worsening low back pain approximately L5-S1 extending bilaterally right greater than left.  Also experiencing bilateral sciatica posteriorly through her hips and thighs continuing laterally through the calves and ending just above her ankles.  She was last seen in December for radiating leg pain as well as SI joint pain.  Her progressed through epidural steroid injection with via pain management, Dr. Francisco, and endorses a few days of relief following this injection.  Also just received SI joint injection a few days ago and has had increasing low back pain.  Also endorses recent onset of waxing and waning bowel incontinence.  She states that at times the stool will leak without her control between time frames of improvement in control.  Balance is also worsening over the past few months.    Patient's BMI is Body mass index is 34.33 kg/m².    14/14 systems reviewed and negative other than what is listed in the history of present illness    Patient Active Problem List   Diagnosis    COVID     Past Medical History:   Diagnosis Date    Personal history of other diseases of the circulatory system 08/13/2021    History of atrial fibrillation    Personal history of other diseases of the musculoskeletal system and connective tissue 08/13/2021    History of degenerative disc disease    Personal history of other diseases of the respiratory system 08/13/2021    History of bronchitis    Personal history of other endocrine, nutritional and metabolic disease     History of hypercholesterolemia     Past Surgical History:   Procedure Laterality Date    APPENDECTOMY  08/24/2017    Appendectomy    CERVICAL FUSION   08/24/2017    Cervical Vertebral Fusion    CHOLECYSTECTOMY  08/24/2017    Cholecystectomy    COLON SURGERY  08/13/2021    Colon Surgery    LUMBAR LAMINECTOMY  08/24/2017    Laminectomy Lumbar    OTHER SURGICAL HISTORY  08/24/2017    Spinal Stereotaxis Stimulation Of Cord     Social History     Tobacco Use    Smoking status: Never     Passive exposure: Never    Smokeless tobacco: Never   Substance Use Topics    Alcohol use: Not Currently     family history is not on file.    Current Outpatient Medications:     acebutolol (Sectral) 200 mg capsule, Take 1 capsule (200 mg) by mouth 2 times a day. (Patient not taking: Reported on 11/6/2024), Disp: , Rfl:     albuterol 2.5 mg /3 mL (0.083 %) nebulizer solution, Inhale 3 mL (2.5 mg) every 4 hours if needed., Disp: , Rfl:     albuterol 90 mcg/actuation inhaler, INHALE 2 PUFFS BY MOUTH AS DIRECTED EVERY 4 HOURS AS NEEDED FOR WHEEZING AND FOR SHORTNESS OF BREATH, Disp: , Rfl:     apixaban (Eliquis) 5 mg tablet, Take 1 tablet (5 mg) by mouth twice a day. (Patient not taking: Reported on 11/6/2024), Disp: , Rfl:     biotin 1 mg capsule, Take 1 tablet by mouth once daily., Disp: , Rfl:     Breo Ellipta 100-25 mcg/dose inhaler, Inhale 1 puff once daily., Disp: , Rfl:     celecoxib (CeleBREX) 100 mg capsule, Take 1 capsule (100 mg) by mouth 2 times a day as needed., Disp: , Rfl:     cholecalciferol (Vitamin D-3) 25 MCG (1000 UT) tablet, Take 1 tablet (1,000 Units) by mouth once daily., Disp: , Rfl:     citalopram (CeleXA) 40 mg tablet, Take 1 tablet (40 mg) by mouth once daily., Disp: , Rfl:     cyanocobalamin (Vitamin B-12) 500 mcg tablet, Take 1 tablet (500 mcg) by mouth once daily., Disp: , Rfl:     cyclobenzaprine (Flexeril) 10 mg tablet, Take 1 tablet (10 mg) by mouth 2 times a day as needed., Disp: , Rfl:     docusate sodium (Colace) 100 mg capsule, Take 1 capsule (100 mg) by mouth 2 times a day as needed., Disp: , Rfl:     dofetilide (Tikosyn) 250 mcg capsule, Take 1  capsule (250 mcg) by mouth twice a day. (Patient not taking: Reported on 11/6/2024), Disp: , Rfl:     FeroSuL tablet, Take 1 tablet (325 mg) by mouth early in the morning.., Disp: , Rfl:     flecainide (Tambocor) 50 mg tablet, Take 1 tablet (50 mg) by mouth once daily., Disp: , Rfl:     lidocaine 4 % patch, Place 1 patch on the skin once daily., Disp: , Rfl:     magnesium oxide (Mag-Ox) 200 mg split tablet, Take 2 half tablet (400 mg) by mouth once daily., Disp: , Rfl:     methocarbamol (Robaxin) 500 mg tablet, Take 1 tablet (500 mg) by mouth if needed for muscle spasms., Disp: , Rfl:     multivitamin tablet, Take 1 tablet by mouth once daily., Disp: , Rfl:     pantoprazole (ProtoNix) 40 mg EC tablet, Take 1 tablet (40 mg) by mouth once daily in the morning. Take before meals., Disp: , Rfl:     potassium gluconate 595 mg (99 mg) tablet, Take 1 tablet by mouth once daily., Disp: , Rfl:     pregabalin (Lyrica) 100 mg capsule, Take 1 capsule (100 mg) by mouth once daily. (Patient taking differently: Take 1 capsule (100 mg) by mouth if needed.), Disp: , Rfl:     spironolactone (Aldactone) 25 mg tablet, Take 1 tablet (25 mg) by mouth once daily. (Patient not taking: Reported on 11/6/2024), Disp: , Rfl:     traMADol (Ultram) 50 mg tablet, Take 2 tablets (100 mg) by mouth every 8 hours if needed for moderate pain (4 - 6)., Disp: , Rfl:     traZODone (Desyrel) 50 mg tablet, Take 1 tablet (50 mg) by mouth once daily at bedtime., Disp: , Rfl:   Allergies   Allergen Reactions    Colchicine Other    Fluticasone Propion-Salmeterol Angioedema and Unknown    Erythromycin Rash     Periorbital dermatitis - worsening sx    Hydroxychloroquine Diarrhea and Hives    Morphine GI Upset and Nausea/vomiting    Nickel Rash    Sulfa (Sulfonamide Antibiotics) Rash and Unknown       Physical Examination:    General: Well developed, awake/alert/oriented x3, no distress, alert and cooperative  Skin: Warm and dry, no lesions, no rashes  ENMT:  Mucous membranes moist, no apparent injury, no lesions seen  Head/Neck: Neck Supple, no apparent injury  Respiratory/Thorax: Normal breath sounds with good chest expansion, thorax symmetric  Cardiovascular: No pitting edema, no JVD    Motor Strength: 4/5 left hip flexion/extension, left knee flexion/extension otherwise 5/5 Throughout all extremities    Muscle Bulk: Normal and symmetric in all extremities    Posture:   -- Cervical: Normal  -- Thoracic: Normal  -- Lumbar : Normal  Paraspinal muscle spasm/tenderness present L5-S1  Midline tenderness lower lumbar    Sensation: intact to light touch       Results:  I personally reviewed and interpreted the imaging results which included broad-based disc bulge L4-5 and bony foraminal stenosis L5-S1 and CT lumbar in January    Assessment and Plan:  Lanie Pablo is a 69 y.o. year old female who presents to the spine clinic in follow up with continued mid thoracic back pain radiating bilaterally to her shoulder blades however has worsening low back pain approximately L5-S1 extending bilaterally right greater than left.  Also experiencing bilateral sciatica posteriorly through her hips and thighs continuing laterally through the calves and ending just above her ankles.  She was last seen in December for radiating leg pain as well as SI joint pain.  Her progressed through epidural steroid injection with via pain management, Dr. Francisco, and endorses a few days of relief following this injection.  Also just received SI joint injection a few days ago and has had increasing low back pain.  Also endorses recent onset of waxing and waning bowel incontinence.  She states that at times the stool will leak without her control between time frames of improvement in control.  Balance is also worsening over the past few months.    Concern for thoracic myelopathy in addition to lumbar radiculopathy and MRI thoracic and lumbar spine will be ordered for further evaluation prior to  follow-up with attending neurosurgeon.      I have reviewed all prior documentation and reviewed the electronic medical record since admission. I have personally have reviewed all advanced imaging not just the reports and used my interpretation as documented as the relevant findings. I have reviewed the risks and benefits of all treatment recommendations listed in this note with the patient and family.       The above clinical summary has been dictated with voice recognition software. It has not been proofread for grammatical errors, typographical mistakes, or other semantic inconsistencies.    Thank you for visiting our office today. It was our pleasure to take part in your healthcare.     Do not hesitate to call with any questions regarding your plan of care after leaving at [phone] M-F 8am-4pm.     To clinicians, thank you very much for this kind referral. It is a privilege to partner with you in the care of your patients. My office would be delighted to assist you with any further consultations or with questions regarding the plan of care outlined. Do not hesitate to call the office or contact me directly.       Sincerely,      JOELLE Allne, PADanyaC  Associate Physician Assistant, Neurosurgery  Clinical   [facility] [place] [address]    Phone: [phone]  Fax: [phone]

## 2025-03-17 ENCOUNTER — APPOINTMENT (OUTPATIENT)
Dept: RADIOLOGY | Facility: HOSPITAL | Age: 70
End: 2025-03-17
Payer: MEDICARE

## 2025-03-17 ENCOUNTER — HOSPITAL ENCOUNTER (EMERGENCY)
Facility: HOSPITAL | Age: 70
Discharge: HOME | End: 2025-03-17
Attending: EMERGENCY MEDICINE
Payer: MEDICARE

## 2025-03-17 VITALS
DIASTOLIC BLOOD PRESSURE: 80 MMHG | SYSTOLIC BLOOD PRESSURE: 148 MMHG | HEIGHT: 64 IN | HEART RATE: 56 BPM | OXYGEN SATURATION: 96 % | RESPIRATION RATE: 18 BRPM | BODY MASS INDEX: 34.15 KG/M2 | TEMPERATURE: 97.9 F | WEIGHT: 200 LBS

## 2025-03-17 DIAGNOSIS — M54.50 CHRONIC BILATERAL LOW BACK PAIN, UNSPECIFIED WHETHER SCIATICA PRESENT: Primary | ICD-10-CM

## 2025-03-17 DIAGNOSIS — G89.29 CHRONIC BILATERAL LOW BACK PAIN, UNSPECIFIED WHETHER SCIATICA PRESENT: Primary | ICD-10-CM

## 2025-03-17 PROCEDURE — 96372 THER/PROPH/DIAG INJ SC/IM: CPT

## 2025-03-17 PROCEDURE — 2500000004 HC RX 250 GENERAL PHARMACY W/ HCPCS (ALT 636 FOR OP/ED): Mod: JZ

## 2025-03-17 PROCEDURE — 2500000001 HC RX 250 WO HCPCS SELF ADMINISTERED DRUGS (ALT 637 FOR MEDICARE OP): Performed by: EMERGENCY MEDICINE

## 2025-03-17 PROCEDURE — 2500000005 HC RX 250 GENERAL PHARMACY W/O HCPCS: Performed by: EMERGENCY MEDICINE

## 2025-03-17 PROCEDURE — 99285 EMERGENCY DEPT VISIT HI MDM: CPT | Performed by: EMERGENCY MEDICINE

## 2025-03-17 PROCEDURE — 72148 MRI LUMBAR SPINE W/O DYE: CPT | Performed by: RADIOLOGY

## 2025-03-17 PROCEDURE — 99284 EMERGENCY DEPT VISIT MOD MDM: CPT | Mod: 25 | Performed by: EMERGENCY MEDICINE

## 2025-03-17 PROCEDURE — 72148 MRI LUMBAR SPINE W/O DYE: CPT

## 2025-03-17 RX ORDER — HYDROMORPHONE HYDROCHLORIDE 1 MG/ML
1 INJECTION, SOLUTION INTRAMUSCULAR; INTRAVENOUS; SUBCUTANEOUS ONCE
Status: COMPLETED | OUTPATIENT
Start: 2025-03-17 | End: 2025-03-17

## 2025-03-17 RX ORDER — OXYCODONE AND ACETAMINOPHEN 5; 325 MG/1; MG/1
1 TABLET ORAL ONCE
Status: COMPLETED | OUTPATIENT
Start: 2025-03-17 | End: 2025-03-17

## 2025-03-17 RX ORDER — KETOROLAC TROMETHAMINE 30 MG/ML
30 INJECTION, SOLUTION INTRAMUSCULAR; INTRAVENOUS ONCE
Status: COMPLETED | OUTPATIENT
Start: 2025-03-17 | End: 2025-03-17

## 2025-03-17 RX ORDER — OXYCODONE AND ACETAMINOPHEN 5; 325 MG/1; MG/1
1 TABLET ORAL EVERY 6 HOURS PRN
Qty: 12 TABLET | Refills: 0 | Status: SHIPPED | OUTPATIENT
Start: 2025-03-17 | End: 2025-03-17

## 2025-03-17 RX ORDER — OXYCODONE AND ACETAMINOPHEN 5; 325 MG/1; MG/1
1 TABLET ORAL EVERY 6 HOURS PRN
Qty: 12 TABLET | Refills: 0 | Status: SHIPPED | OUTPATIENT
Start: 2025-03-17

## 2025-03-17 RX ORDER — LIDOCAINE 560 MG/1
1 PATCH PERCUTANEOUS; TOPICAL; TRANSDERMAL ONCE
Status: DISCONTINUED | OUTPATIENT
Start: 2025-03-17 | End: 2025-03-17 | Stop reason: HOSPADM

## 2025-03-17 RX ADMIN — KETOROLAC TROMETHAMINE 30 MG: 30 INJECTION, SOLUTION INTRAMUSCULAR at 10:20

## 2025-03-17 RX ADMIN — HYDROMORPHONE HYDROCHLORIDE 1 MG: 1 INJECTION, SOLUTION INTRAMUSCULAR; INTRAVENOUS; SUBCUTANEOUS at 12:27

## 2025-03-17 RX ADMIN — LIDOCAINE 1 PATCH: 4 PATCH TOPICAL at 12:20

## 2025-03-17 RX ADMIN — OXYCODONE HYDROCHLORIDE AND ACETAMINOPHEN 1 TABLET: 5; 325 TABLET ORAL at 12:21

## 2025-03-17 RX ADMIN — HYDROMORPHONE HYDROCHLORIDE 1 MG: 1 INJECTION, SOLUTION INTRAMUSCULAR; INTRAVENOUS; SUBCUTANEOUS at 10:20

## 2025-03-17 ASSESSMENT — PAIN DESCRIPTION - ONSET: ONSET: ONGOING

## 2025-03-17 ASSESSMENT — PAIN DESCRIPTION - ORIENTATION
ORIENTATION: LOWER;RIGHT;LEFT
ORIENTATION: LOWER

## 2025-03-17 ASSESSMENT — PAIN - FUNCTIONAL ASSESSMENT
PAIN_FUNCTIONAL_ASSESSMENT: 0-10

## 2025-03-17 ASSESSMENT — LIFESTYLE VARIABLES
HAVE PEOPLE ANNOYED YOU BY CRITICIZING YOUR DRINKING: NO
EVER FELT BAD OR GUILTY ABOUT YOUR DRINKING: NO
EVER HAD A DRINK FIRST THING IN THE MORNING TO STEADY YOUR NERVES TO GET RID OF A HANGOVER: NO
TOTAL SCORE: 0
HAVE YOU EVER FELT YOU SHOULD CUT DOWN ON YOUR DRINKING: NO

## 2025-03-17 ASSESSMENT — COLUMBIA-SUICIDE SEVERITY RATING SCALE - C-SSRS
1. IN THE PAST MONTH, HAVE YOU WISHED YOU WERE DEAD OR WISHED YOU COULD GO TO SLEEP AND NOT WAKE UP?: NO
6. HAVE YOU EVER DONE ANYTHING, STARTED TO DO ANYTHING, OR PREPARED TO DO ANYTHING TO END YOUR LIFE?: NO
2. HAVE YOU ACTUALLY HAD ANY THOUGHTS OF KILLING YOURSELF?: NO

## 2025-03-17 ASSESSMENT — PAIN DESCRIPTION - PAIN TYPE
TYPE: ACUTE PAIN
TYPE: ACUTE PAIN
TYPE: CHRONIC PAIN

## 2025-03-17 ASSESSMENT — PAIN DESCRIPTION - DESCRIPTORS: DESCRIPTORS: ACHING

## 2025-03-17 ASSESSMENT — PAIN SCALES - GENERAL
PAINLEVEL_OUTOF10: 6
PAINLEVEL_OUTOF10: 8
PAINLEVEL_OUTOF10: 9

## 2025-03-17 ASSESSMENT — PAIN DESCRIPTION - LOCATION
LOCATION: BACK
LOCATION: BACK

## 2025-03-17 ASSESSMENT — PAIN DESCRIPTION - PROGRESSION: CLINICAL_PROGRESSION: GRADUALLY WORSENING

## 2025-03-17 ASSESSMENT — PAIN DESCRIPTION - FREQUENCY: FREQUENCY: CONSTANT/CONTINUOUS

## 2025-03-17 NOTE — ED PROVIDER NOTES
Emergency Department Provider Note        History of Present Illness     History provided by: Patient  Limitations to History: None  External Records Reviewed with Brief Summary: None    HPI:  Lanie Pablo is a 69 y.o. female with atrial fibrillation s/p ablation, hypertension, hyperlipidemia CHF, multiple lumbar laminectomies, pain stimulator presenting for lower back pain.  Symptoms ongoing for several weeks but worsened significantly over the last week.  She has not had any saddle paresthesias but reports that she has had intermittent urinary incontinence for the last 8 months.  She has chronic numbness of her toes bilaterally, unchanged.  The pain radiates down the back of her legs.  She saw her neurosurgeon physician assistant several weeks ago who ordered her an outpatient MRI.  She also saw pain management about a week and a half ago for an injection of her back.  Since then, she feels that her back pain has worsened and feels that her incontinence has also worsened.  Denies any fever, chills, urinary symptoms.  The pain is in the typical location and quality but more severe.  She has tramadol gabapentin at home which is not working.  She has also tried NSAIDs in the past but that does not work either.    Physical Exam   Triage vitals:  T 36.6 °C (97.9 °F)  HR 68  /68  RR 17  O2 95 % None (Room air)    Physical Exam  Vitals and nursing note reviewed.   Constitutional:       General: She is not in acute distress.     Appearance: She is well-developed.   HENT:      Head: Normocephalic and atraumatic.      Right Ear: External ear normal.      Left Ear: External ear normal.      Nose: Nose normal.      Mouth/Throat:      Mouth: Mucous membranes are moist.   Eyes:      General: No scleral icterus.     Extraocular Movements: Extraocular movements intact.      Conjunctiva/sclera: Conjunctivae normal.      Pupils: Pupils are equal, round, and reactive to light.   Cardiovascular:      Rate and Rhythm:  Normal rate and regular rhythm.      Heart sounds: No murmur heard.  Pulmonary:      Effort: Pulmonary effort is normal. No respiratory distress.      Breath sounds: Normal breath sounds.   Abdominal:      Palpations: Abdomen is soft.      Tenderness: There is no abdominal tenderness.   Musculoskeletal:         General: No swelling.      Cervical back: Neck supple.   Skin:     General: Skin is warm and dry.      Comments: Postsurgical incision sites on her lumbar spine that are well-healed.  No erythema or fluctuance that indicate signs of infection.   Neurological:      Mental Status: She is alert. Mental status is at baseline.      Comments: Chronic numbness of her toes bilaterally.  Strength intact although limited due to pain.   Psychiatric:         Mood and Affect: Mood normal.          Medical Decision Making & ED Course   Medical Decision Makin y.o. female presenting for lower back pain.  She is afebrile hemodynamically stable on arrival.  Had a recent injection.  Also saw her neurosurgeons physician assistant recently who ordered an outpatient MRI.  Has reported worsening pain since her injection as well as worsening incontinence, although that has been ongoing for 8 months.  No saddle paresthesias.  No weakness of the lower extremities although range of motion limited due to pain.  Given her recent injection as well as worsening symptoms and pain, will obtain MRI of her lumbar spine to evaluate for hematoma, cauda equina, or other acute pathology.  Will provide Toradol, Dilaudid intramuscularly for her pain.  Does not appear to have infection on her lower back and she has not had any fevers or other infectious signs.    MR lumbar spine wo IV contrast   Final Result   1. No significant central canal stenosis is noted. The images are   significantly degraded by metallic artifact related to the posterior   fusion devices making evaluation of the lateral recesses and foramina   suboptimal.        2. The  postoperative collection of fluid at the laminectomy site does   not compress the thecal sac.        I personally reviewed the images/study and I agree with the findings   as stated. This study was interpreted at [facility], [place], [place].        MACRO:   None        Signed by: Rusty Jovel 3/17/2025 12:20 PM   Dictation workstation:   [id number]         MRI findings discussed with Dr. Quigley, patient's neurosurgeon.  He reviewed the images.  No acute intervention is needed at this time, safe for discharge and outpatient follow-up.  This was discussed with the patient.  She is under a pain contract with her pain management doctor so his office was contacted.  Okay for discharge home with several doses of Percocet, so this is prescribed to the patient.  Home care and return instructions discussed. Patient expressed understanding and agreement. Patient discharged in stable condition.    Sergey Vela DO, PGY-4  Emergency Medicine Resident     Social Determinants of Health which Significantly Impact Care: None identified     EKG Independent Interpretation: EKG not obtained    Independent Result Review and Interpretation: Relevant laboratory and radiographic results were reviewed and independently interpreted by myself.  As necessary, they are commented on in the ED Course.    Chronic conditions affecting the patient's care: As documented above in [facility]    The patient was discussed with the following consultants/services:  neurosurgery    Care Considerations: As documented above in [facility]    ED Course:  Diagnoses as of 03/18/25 0049   Chronic bilateral low back pain, unspecified whether sciatica present     Disposition   As a result of the work-up, the patient was discharged home.  she was informed of her diagnosis and instructed to come back with any concerns or worsening of condition.  she and was agreeable to the plan as discussed above.  she was given the opportunity to ask questions.   All of the patient's questions were answered.    Procedures   Procedures    Patient seen and discussed with ED attending physician.    Sergey Vela DO  Emergency Medicine    =================Attending note===============    The patient was seen by the resident/fellow.  I have personally performed a substantive portion of the encounter.  I have seen and examined the patient; agree with the workup, evaluation, MDM,   management and diagnosis.  The care plan has been discussed with the resident; I have reviewed the resident’s note and agree with the documented findings.      This is a 69 y.o. female who presents to ER with low back pain.  She has had chronic issues of low back pain.  Has been getting worse.  She has had an injection in her back about a week and a half ago.  She thinks it is at the lumbosacral junction.  States that she gets some chronic numbness and tingling in her toes.  States it has been slightly progressive.  She has had some issues with intermittent incontinence over the last 8 months.  Is been incontinence of stool.  No fevers or chills.  No saddle anesthesias.  No recent falls or injuries.  She does have a history of A-fib.  She had a prior ablation.  No anticoagulants.  She does have a history of CHF, hyperlipidemia, hypertension.  She does follow Dr. Quigley and his ESTRADA Giovanni for the spinal issues.    Heart is regular.  Lungs are clear.  Abdomen is soft and nontender.  No guarding or rebound.  No peritoneal signs.  No abdominal bruits.    She is tender to palpate at the lower lumbosacral junction.  Deep tendon reflexes are 2+ at the bilateral patella and Achilles.  There is full strength against resistance with flexion extension at the hips, knees, ankles, toes.  She is able to stand on her toes.    Due to the recent injection and this increasing pain, we did order an MRI to make sure there is no infectious process or hematomas in this area.  Also give us an idea with going on with the rest of  the spine and the nerves.    No acute surgical findings on MRI.  Case discussed with Dr Quigley.  He did review the MRI images himself.    He felt patient was safe for discharge home.  No acute surgical intervention.  Patient to follow-up with him in the office.    I did discuss this all with the patient.  She was concerned that she is going to go home and have pain.  I did offer to prescribe her a few Percocet tabs and she states she has a pain management contract.  I did [facility] and will arrange and to discuss the case with his nurse.  They are fine with having the patient take a short course of Percocet they can discontinue it and resume tramadol.    Patient is given an RX for percocet.  She is to follow-up with her neurosurgeon and pain management doctor.  She is to return to nearest ER for any new or worsening symptoms.            ==========================================       Sergey Vela DO  Resident  03/18/25 0049

## 2025-03-17 NOTE — Clinical Note
Lanie Pablo was seen and treated in our emergency department on 3/17/2025.  She may return to work on 03/20/2025.       If you have any questions or concerns, please don't hesitate to call.      Mike Barba, DO

## 2025-03-17 NOTE — DISCHARGE INSTRUCTIONS
Seek immediate medical attention if you develop: increasing pain, numbness, tingling, weakness, loss of motion in your arms or legs, loss of control of your urine or stool, fever, abdominal pain, chest pain, shortness of breath, or any new or worsening symptoms.    Do not take tramadol while taking Percocet.

## 2025-05-03 ENCOUNTER — HOSPITAL ENCOUNTER (OUTPATIENT)
Dept: RADIOLOGY | Facility: HOSPITAL | Age: 70
Discharge: HOME | End: 2025-05-03
Payer: MEDICARE

## 2025-05-03 DIAGNOSIS — G89.29 CHRONIC MIDLINE THORACIC BACK PAIN: ICD-10-CM

## 2025-05-03 DIAGNOSIS — M54.6 CHRONIC MIDLINE THORACIC BACK PAIN: ICD-10-CM

## 2025-05-03 PROCEDURE — 72146 MRI CHEST SPINE W/O DYE: CPT | Performed by: RADIOLOGY

## 2025-05-03 PROCEDURE — 72146 MRI CHEST SPINE W/O DYE: CPT

## 2025-05-08 ENCOUNTER — HOSPITAL ENCOUNTER (OUTPATIENT)
Dept: RADIOLOGY | Facility: CLINIC | Age: 70
Discharge: HOME | End: 2025-05-08
Payer: MEDICARE

## 2025-05-08 ENCOUNTER — OFFICE VISIT (OUTPATIENT)
Facility: CLINIC | Age: 70
End: 2025-05-08
Payer: MEDICARE

## 2025-05-08 VITALS
HEART RATE: 73 BPM | TEMPERATURE: 97.6 F | WEIGHT: 207.5 LBS | HEIGHT: 64 IN | DIASTOLIC BLOOD PRESSURE: 82 MMHG | SYSTOLIC BLOOD PRESSURE: 124 MMHG | BODY MASS INDEX: 35.42 KG/M2 | RESPIRATION RATE: 16 BRPM

## 2025-05-08 DIAGNOSIS — M54.41 ACUTE MIDLINE LOW BACK PAIN WITH BILATERAL SCIATICA: ICD-10-CM

## 2025-05-08 DIAGNOSIS — M46.1 SACROILIITIS, NOT ELSEWHERE CLASSIFIED: ICD-10-CM

## 2025-05-08 DIAGNOSIS — M54.42 CHRONIC BILATERAL LOW BACK PAIN WITH BILATERAL SCIATICA: Primary | ICD-10-CM

## 2025-05-08 DIAGNOSIS — M54.42 ACUTE MIDLINE LOW BACK PAIN WITH BILATERAL SCIATICA: ICD-10-CM

## 2025-05-08 DIAGNOSIS — G89.29 CHRONIC BILATERAL LOW BACK PAIN WITH BILATERAL SCIATICA: Primary | ICD-10-CM

## 2025-05-08 DIAGNOSIS — M54.41 CHRONIC BILATERAL LOW BACK PAIN WITH BILATERAL SCIATICA: Primary | ICD-10-CM

## 2025-05-08 PROCEDURE — 1125F AMNT PAIN NOTED PAIN PRSNT: CPT | Performed by: STUDENT IN AN ORGANIZED HEALTH CARE EDUCATION/TRAINING PROGRAM

## 2025-05-08 PROCEDURE — 3008F BODY MASS INDEX DOCD: CPT | Performed by: STUDENT IN AN ORGANIZED HEALTH CARE EDUCATION/TRAINING PROGRAM

## 2025-05-08 PROCEDURE — 99215 OFFICE O/P EST HI 40 MIN: CPT | Performed by: STUDENT IN AN ORGANIZED HEALTH CARE EDUCATION/TRAINING PROGRAM

## 2025-05-08 PROCEDURE — 72120 X-RAY BEND ONLY L-S SPINE: CPT

## 2025-05-08 PROCEDURE — 1036F TOBACCO NON-USER: CPT | Performed by: STUDENT IN AN ORGANIZED HEALTH CARE EDUCATION/TRAINING PROGRAM

## 2025-05-08 PROCEDURE — 1159F MED LIST DOCD IN RCRD: CPT | Performed by: STUDENT IN AN ORGANIZED HEALTH CARE EDUCATION/TRAINING PROGRAM

## 2025-05-08 PROCEDURE — 72070 X-RAY EXAM THORAC SPINE 2VWS: CPT

## 2025-05-08 ASSESSMENT — PATIENT HEALTH QUESTIONNAIRE - PHQ9
2. FEELING DOWN, DEPRESSED OR HOPELESS: NOT AT ALL
1. LITTLE INTEREST OR PLEASURE IN DOING THINGS: NOT AT ALL
SUM OF ALL RESPONSES TO PHQ9 QUESTIONS 1 AND 2: 0

## 2025-05-08 ASSESSMENT — PAIN SCALES - GENERAL: PAINLEVEL_OUTOF10: 8

## 2025-05-08 NOTE — PROGRESS NOTES
[facility]  Department of Neurological Surgery  Established Patient Visit    History of Present Illness:  Lanie Pablo is a 69 y.o. year old female who presents to the spine clinic in follow up with continued mid thoracic back pain radiating bilaterally to her shoulder blades however has worsening low back pain approximately L5-S1 extending bilaterally right greater than left.  Also experiencing bilateral sciatica posteriorly through her hips and thighs continuing laterally through the calves and ending just above her ankles.  She was last seen in December for radiating leg pain as well as SI joint pain.  Her progressed through epidural steroid injection with via pain management, Dr. Francisco, and endorses a few days of relief following this injection.  Also just received SI joint injection a few days ago and has had increasing low back pain.  Also endorses recent onset of waxing and waning bowel incontinence.  She states that at times the stool will leak without her control between time frames of improvement in control.  Balance is also worsening over the past few months.      Since her last visit, her symptoms have progressed. The back pain is predominately in the belt line and radiates down the posterior aspect of the legs into the bilateral ankles. She has difficulty walking secondary to pain. She had to call of work yesterday due to being unable to walk. She is unable to do any activities of daily living. Her last injection helped for only 2 days. Her spinal cord stimulator usually helped but she states it is not doing much for her anymore. She denies smoking and diabetes.    Patient's BMI is Body mass index is 35.62 kg/m².    Diabetic: no       Osteoporosis: n/a  No DXA results found for the past 12 months    Review of Systems:  14/14 systems reviewed and negative other than what is listed in the history of present illness    Problem List[1]  Medical History[2]  Surgical  History[3]  Social History     Tobacco Use    Smoking status: Never     Passive exposure: Never    Smokeless tobacco: Never   Substance Use Topics    Alcohol use: Not Currently     family history is not on file.  Current Medications[4]  Allergies[5]    Physical Examination:    General: Well developed, awake/alert/oriented x3, no distress, alert and cooperative  Skin: Warm and dry, no lesions, no rashes  ENMT: Mucous membranes moist, no apparent injury, no lesions seen  Head/Neck: Neck Supple, no apparent injury  Respiratory/Thorax: Normal breath sounds with good chest expansion, thorax symmetric  Cardiovascular: No pitting edema, no JVD    Motor Strength: 5/5 Throughout all extremities    Muscle Bulk: Normal and symmetric in all extremities    Posture:   -- Cervical: Normal  -- Thoracic: Normal  -- Lumbar : Normal  Paraspinal muscle spasm/tenderness absent.     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 year old female who presents to the spine clinic in follow up with continued mid thoracic back pain radiating bilaterally to her shoulder blades however has worsening low back pain approximately L5-S1 extending bilaterally right greater than left.  Also experiencing bilateral sciatica posteriorly through her hips and thighs continuing laterally through the calves and ending just above her ankles.  She was last seen in December for radiating leg pain as well as SI joint pain.  Her progressed through epidural steroid injection with via pain management, Dr. Francisco, and endorses a few days of relief  following this injection.  Also just received SI joint injection a few days ago and has had increasing low back pain.  Also endorses recent onset of waxing and waning bowel incontinence.  She states that at times the stool will leak without her control between time frames of improvement in control.  Balance is also worsening over the past few months.      Since her last visit, her symptoms have progressed. The back pain is predominately in the belt line and radiates down the posterior aspect of the legs into the bilateral ankles. She has difficulty walking secondary to pain. She had to call of work yesterday due to being unable to walk. She is unable to do any activities of daily living. Her last injection helped for only 2 days. Her spinal cord stimulator usually helped but she states it is not doing much for her anymore. She denies smoking and diabetes.    At this point, she has failed all conservative care. I discussed the risks and benefits of revision surgery and redo fusion with her. I am not certain that her previous fusion has completely fused upon reviewing her imaging today. From a structural perspective, everything appears to be healed except for the bottom disc spaces. I would like to get scoliosis x-rays to evaluate her global alignment. I will also get x-rays of the thoracic and lumbar spine.     I have reviewed imaging and diagnosis with the patient, discussed the natural history of their disease and both non-operative and operative treatments available and rationale vs risks for both.    The patient’s clinical symptoms correlates well with the radiological findings. Patient has been having significant functional impairment with decreased ability to perform her normal activities of daily living. They have tried treatment options including medications (NSAIDs/narcotics/muscle relaxants/membrane stabilizers), formal physical therapy, and injections.    I offered the option of surgery that would consist  of a exploration of prior T10-Pelvis fusion with revision of amanda fracture at L3-4 and SI fusion bilaterally as well as revision posterolateral arthrodesis L5-S1.    While there is no evidence of instability in the form of pars defect or spondylolisthesis or prior discectomies; because of the presence of foraminal and extraforaminal severe stenosis, extensive decompression with removal of almost the entire facet in the form of complete facetectomy /resection of pars interarticularis or more than 75% of the facet will have to be performed at the operative levels that will result in destabilization of the spine/intraoperative spinal instability and hence would require concomitant stabilization by placement of pedicle screws. I believe that not performing a fusion and instrumentation following the extensive decompression will be a suboptimal treatment and leave the spine destabilized with potential worsening of her symptoms and development of spinal deformity that may require a much bigger revision surgery that currently planned.    I have explained the surgical procedure in detail with expected duration and extent of recovery along risks of surgery that include, but is not limited to bleeding, infection, blood vessel injury or damage, loss of sensation, loss of bladder, bowel or sexual function, nerve injury/damage resulting in weakness/paralysis, malunion, nonunion, CSF leak, brachial plexus injury, peripheral vision blindness, failure of implants/fusion, failure to relieve symptoms, recurrent disease, adjacent segment disease, need to reoperate for any reason and general anesthesia reaction such as stroke, coma, heart attack, delirium, confusion, death as well as worsening of preexisted medical conditions.    All questions were answered and the patient left satisfied with the surgical plan moving forward.    I have reviewed all prior documentation and reviewed the electronic medical record since admission. I have  personally have reviewed all advanced imaging not just the reports and used my interpretation as documented as the relevant findings. I have reviewed the risks and benefits of all treatment recommendations listed in this note with the patient and family.       The above clinical summary has been dictated with voice recognition software. It has not been proofread for grammatical errors, typographical mistakes, or other semantic inconsistencies.    Thank you for visiting our office today. It was our pleasure to take part in your healthcare.     Do not hesitate to call with any questions regarding your plan of care after leaving at [phone] M-F 8am-4pm.     To clinicians, thank you very much for this kind referral. It is a privilege to partner with you in the care of your patients. My office would be delighted to assist you with any further consultations or with questions regarding the plan of care outlined. Do not hesitate to call the office or contact me directly.       Sincerely,      Mike Quigley MD, [facility]  Spine , [facility]  Darell Delcid Chair in Spinal Neurosurgery  Complex Spine Surgery Fellowship Director   of Neurological Surgery  [facility] [employer]  Phone: [phone]  Fax: [phone]        Scribe Attestation  By signing my name below, I, Rosemarie Glo Ardon   attest that this documentation has been prepared under the direction and in the presence of Mike Quigley MD.           [1]   Patient Active Problem List  Diagnosis    COVID   [2]   Past Medical History:  Diagnosis Date    Personal history of other diseases of the circulatory system 08/13/2021    History of atrial fibrillation    Personal history of other diseases of the musculoskeletal system and connective tissue 08/13/2021    History of degenerative disc disease    Personal history of other diseases of the  respiratory system 08/13/2021    History of bronchitis    Personal history of other endocrine, nutritional and metabolic disease     History of hypercholesterolemia   [3]   Past Surgical History:  Procedure Laterality Date    APPENDECTOMY  08/24/2017    Appendectomy    CERVICAL FUSION  08/24/2017    Cervical Vertebral Fusion    CHOLECYSTECTOMY  08/24/2017    Cholecystectomy    COLON SURGERY  08/13/2021    Colon Surgery    LUMBAR LAMINECTOMY  08/24/2017    Laminectomy Lumbar    OTHER SURGICAL HISTORY  08/24/2017    Spinal Stereotaxis Stimulation Of Cord   [4]   Current Outpatient Medications:     albuterol 2.5 mg /3 mL (0.083 %) nebulizer solution, Inhale 3 mL (2.5 mg) every 4 hours if needed., Disp: , Rfl:     albuterol 90 mcg/actuation inhaler, INHALE 2 PUFFS BY MOUTH AS DIRECTED EVERY 4 HOURS AS NEEDED FOR WHEEZING AND FOR SHORTNESS OF BREATH, Disp: , Rfl:     biotin 1 mg capsule, Take 1 tablet by mouth once daily., Disp: , Rfl:     Breo Ellipta 100-25 mcg/dose inhaler, Inhale 1 puff once daily., Disp: , Rfl:     celecoxib (CeleBREX) 100 mg capsule, Take 1 capsule (100 mg) by mouth 2 times a day as needed., Disp: , Rfl:     cholecalciferol (Vitamin D-3) 25 MCG (1000 UT) tablet, Take 1 tablet (1,000 Units) by mouth once daily., Disp: , Rfl:     citalopram (CeleXA) 40 mg tablet, Take 1 tablet (40 mg) by mouth once daily., Disp: , Rfl:     cyanocobalamin (Vitamin B-12) 500 mcg tablet, Take 1 tablet (500 mcg) by mouth once daily., Disp: , Rfl:     cyclobenzaprine (Flexeril) 10 mg tablet, Take 1 tablet (10 mg) by mouth 2 times a day as needed., Disp: , Rfl:     docusate sodium (Colace) 100 mg capsule, Take 1 capsule (100 mg) by mouth 2 times a day as needed., Disp: , Rfl:     FeroSuL tablet, Take 1 tablet (325 mg) by mouth early in the morning.., Disp: , Rfl:     flecainide (Tambocor) 50 mg tablet, Take 1 tablet (50 mg) by mouth once daily., Disp: , Rfl:     lidocaine 4 % patch, Place 1 patch on the skin once daily.,  Disp: , Rfl:     magnesium oxide (Mag-Ox) 200 mg split tablet, Take 2 half tablet (400 mg) by mouth once daily., Disp: , Rfl:     methocarbamol (Robaxin) 500 mg tablet, Take 1 tablet (500 mg) by mouth if needed for muscle spasms., Disp: , Rfl:     multivitamin tablet, Take 1 tablet by mouth once daily., Disp: , Rfl:     oxyCODONE-acetaminophen (Percocet) 5-325 mg tablet, Take 1 tablet by mouth every 6 hours if needed for severe pain (7 - 10). Do not take tramadol while taking this medication., Disp: 12 tablet, Rfl: 0    pantoprazole (ProtoNix) 40 mg EC tablet, Take 1 tablet (40 mg) by mouth once daily in the morning. Take before meals., Disp: , Rfl:     potassium gluconate 595 mg (99 mg) tablet, Take 1 tablet by mouth once daily., Disp: , Rfl:     pregabalin (Lyrica) 100 mg capsule, Take 1 capsule (100 mg) by mouth once daily. (Patient taking differently: Take 1 capsule (100 mg) by mouth if needed.), Disp: , Rfl:     traZODone (Desyrel) 50 mg tablet, Take 1 tablet (50 mg) by mouth once daily at bedtime., Disp: , Rfl:     acebutolol (Sectral) 200 mg capsule, Take 1 capsule (200 mg) by mouth 2 times a day. (Patient not taking: Reported on 5/8/2025), Disp: , Rfl:     apixaban (Eliquis) 5 mg tablet, Take 1 tablet (5 mg) by mouth twice a day. (Patient not taking: Reported on 5/8/2025), Disp: , Rfl:     dofetilide (Tikosyn) 250 mcg capsule, Take 1 capsule (250 mcg) by mouth twice a day. (Patient not taking: Reported on 5/8/2025), Disp: , Rfl:     spironolactone (Aldactone) 25 mg tablet, Take 1 tablet (25 mg) by mouth once daily. (Patient not taking: Reported on 5/8/2025), Disp: , Rfl:     traMADol (Ultram) 50 mg tablet, Take 2 tablets (100 mg) by mouth every 8 hours if needed for moderate pain (4 - 6). (Patient not taking: Reported on 5/8/2025), Disp: , Rfl:   [5]   Allergies  Allergen Reactions    Colchicine Other    Fluticasone Propion-Salmeterol Angioedema and Unknown    Erythromycin Rash     Periorbital dermatitis -  worsening sx    Hydroxychloroquine Diarrhea and Hives    Morphine GI Upset and Nausea/vomiting    Nickel Rash    Sulfa (Sulfonamide Antibiotics) Rash and Unknown

## 2025-05-09 ENCOUNTER — HOSPITAL ENCOUNTER (OUTPATIENT)
Dept: RADIOLOGY | Facility: HOSPITAL | Age: 70
Discharge: HOME | End: 2025-05-09
Payer: MEDICARE

## 2025-05-09 DIAGNOSIS — M54.41 ACUTE MIDLINE LOW BACK PAIN WITH BILATERAL SCIATICA: ICD-10-CM

## 2025-05-09 DIAGNOSIS — M54.42 ACUTE MIDLINE LOW BACK PAIN WITH BILATERAL SCIATICA: ICD-10-CM

## 2025-05-09 PROCEDURE — 72082 X-RAY EXAM ENTIRE SPI 2/3 VW: CPT

## 2025-05-13 ENCOUNTER — TELEPHONE (OUTPATIENT)
Facility: CLINIC | Age: 70
End: 2025-05-13
Payer: MEDICARE

## 2025-05-13 NOTE — TELEPHONE ENCOUNTER
----- Message from Mike Qugiley sent at 5/13/2025 12:28 PM EDT -----  Regarding: Results Review  Scoliosis xrays confirm we would need to revise this construct if she would like to proceed, then needs to schedule       ----- Message -----  From: Interface, Radiology Results In  Sent: 5/9/2025  10:23 AM EDT  To: Mike Quigley MD

## 2025-05-14 ENCOUNTER — TELEPHONE (OUTPATIENT)
Dept: NEUROSURGERY | Facility: HOSPITAL | Age: 70
End: 2025-05-14
Payer: MEDICARE

## 2025-05-14 DIAGNOSIS — R79.1 ABNORMAL COAGULATION PROFILE: ICD-10-CM

## 2025-05-14 DIAGNOSIS — G89.29 CHRONIC BILATERAL LOW BACK PAIN WITH BILATERAL SCIATICA: Primary | ICD-10-CM

## 2025-05-14 DIAGNOSIS — M54.42 CHRONIC BILATERAL LOW BACK PAIN WITH BILATERAL SCIATICA: Primary | ICD-10-CM

## 2025-05-14 DIAGNOSIS — M54.41 CHRONIC BILATERAL LOW BACK PAIN WITH BILATERAL SCIATICA: Primary | ICD-10-CM

## 2025-05-14 NOTE — TELEPHONE ENCOUNTER
Result Communication    Resulted Orders   MR thoracic spine wo IV contrast    Narrative    Interpreted By:  Rusty Jovel,   STUDY:  MR THORACIC SPINE WO IV CONTRAST;  5/3/2025 10:06 am      INDICATION:  Signs/Symptoms:chronic thoracic back pain near scapula.      ,M54.6 Pain in thoracic spine,G89.29 Other chronic pain      COMPARISON:  01/21/2025 thoracic spine CT      ACCESSION NUMBER(S):  [id number]      ORDERING CLINICIAN:  DILIP PAULINO      TECHNIQUE:  Sagittal T1, T2, STIR and axial T2 and T1 weighted MR images of the  thoracic spine were obtained.      FINDINGS:  There are 12 rib-bearing thoracic vertebrae.      Bilateral pedicle screw/amanda fusion is again noted from T10 inferiorly  to at least L2-3.      Metallic artifact from stimulating device extends along the dorsal  spinal canal from T7 to T9 with the wires entering the spinal canal  at T9-10, unchanged.      The spinal canal contents are obscured by metallic artifact from T10  inferiorly especially in the upper lumbar region.      The  images demonstrate anterior fusion from C4-C7.  Retrolisthesis at C3-4 and disc bulge flatten the ventral cord.      The T6 vertebral body has a benign hemangioma, unchanged.      No intrinsic abnormalities of the spinal cord are noted.          C7-T1: Disc protrusion abuts and slightly flattens the ventral cord.      C2-3: The spinal canal is mildly stenosed by disc bulge with mild  foraminal stenoses bilaterally.      T3-4 through T6-7: No stenoses are noted.      T6-7: The right lateral recess and spinal canal are mildly to  moderately stenosed by focal disc protrusion displacing the cord  posteriorly and flattening at on the right.      T7-8 through T12-L1: No stenoses are noted.      No masses or inflammatory processes in the thoracic paraspinal region  are noted.            Impression    Mild to moderate degenerative changes are present in the thoracic  region as noted, most prominent on the right at T6-7.       MACRO:  None      Signed by: Rusty Jovel 5/5/2025 12:46 PM  Dictation workstation:   [id number]       4:50 PM      Results were successfully communicated with the patient and they acknowledged their understanding.    Discussed at office visit with Dr. Quigley.

## 2025-05-15 PROBLEM — M54.41 CHRONIC BILATERAL LOW BACK PAIN WITH BILATERAL SCIATICA: Status: ACTIVE | Noted: 2025-05-14

## 2025-05-15 PROBLEM — M54.42 CHRONIC BILATERAL LOW BACK PAIN WITH BILATERAL SCIATICA: Status: ACTIVE | Noted: 2025-05-14

## 2025-05-15 PROBLEM — G89.29 CHRONIC BILATERAL LOW BACK PAIN WITH BILATERAL SCIATICA: Status: ACTIVE | Noted: 2025-05-14

## 2025-05-15 RX ORDER — ACETAMINOPHEN 325 MG/1
975 TABLET ORAL ONCE
OUTPATIENT
Start: 2025-05-15

## 2025-05-15 RX ORDER — CELECOXIB 400 MG/1
400 CAPSULE ORAL ONCE
OUTPATIENT
Start: 2025-05-15

## 2025-05-15 RX ORDER — TRANEXAMIC ACID 650 MG/1
1300 TABLET ORAL ONCE
OUTPATIENT
Start: 2025-05-15

## 2025-06-09 ENCOUNTER — CLINICAL SUPPORT (OUTPATIENT)
Dept: PREADMISSION TESTING | Facility: HOSPITAL | Age: 70
End: 2025-06-09
Payer: MEDICARE

## 2025-06-09 NOTE — CPM/PAT H&P
[facility]/PAT Evaluation       Name: Lanie Pablo (Lanie Pablo)  /Age: 1955/70 y.o.     {[facility] Visit Type:51582}      Chief Complaint: ***    HPI    Medical History[1]    Surgical History[2]    Patient  has no history on file for sexual activity.    Family History[3]    Allergies[4]    Lanie Pablo is scheduled for Exploration of Prior T10-Pelvis Fusion with revision of amanda fracture at L3-L4 and S1 Fusion bilaterally as well as revision posterolateral arthrodesis L5-S1 on 25    **Data Input  Prior to Admission medications    Medication Sig Start Date End Date Taking? Authorizing Provider   acebutolol (Sectral) 200 mg capsule Take 1 capsule (200 mg) by mouth 2 times a day.  Patient not taking: Reported on 2025 10/14/20   Historical Provider, MD   albuterol 2.5 mg /3 mL (0.083 %) nebulizer solution Inhale 3 mL (2.5 mg) every 4 hours if needed. 24   Historical Provider, MD   albuterol 90 mcg/actuation inhaler INHALE 2 PUFFS BY MOUTH AS DIRECTED EVERY 4 HOURS AS NEEDED FOR WHEEZING AND FOR SHORTNESS OF BREATH 23   Historical Provider, MD   apixaban (Eliquis) 5 mg tablet Take 1 tablet (5 mg) by mouth twice a day.  Patient not taking: Reported on 2025   Historical Provider, MD   biotin 1 mg capsule Take 1 tablet by mouth once daily.    Historical Provider, MD   Breashley Ellipta 100-25 mcg/dose inhaler Inhale 1 puff once daily.    Historical Provider, MD   celecoxib (CeleBREX) 100 mg capsule Take 1 capsule (100 mg) by mouth 2 times a day as needed.    Historical Provider, MD   cholecalciferol (Vitamin D-3) 25 MCG (1000 UT) tablet Take 1 tablet (1,000 Units) by mouth once daily.    Historical Provider, MD   citalopram (CeleXA) 40 mg tablet Take 1 tablet (40 mg) by mouth once daily.    Historical Provider, MD   cyanocobalamin (Vitamin B-12) 500 mcg tablet Take 1 tablet (500 mcg) by mouth once daily. 24   Historical Provider, MD   cyclobenzaprine (Flexeril) 10 mg tablet Take  1 tablet (10 mg) by mouth 2 times a day as needed. 9/23/20   Historical Provider, MD   docusate sodium (Colace) 100 mg capsule Take 1 capsule (100 mg) by mouth 2 times a day as needed.    Historical Provider, MD   dofetilide (Tikosyn) 250 mcg capsule Take 1 capsule (250 mcg) by mouth twice a day.  Patient not taking: Reported on 5/8/2025 5/17/23   Historical Provider, MD   FeroSuL tablet Take 1 tablet (325 mg) by mouth early in the morning.. 1/11/24   Historical Provider, MD   flecainide (Tambocor) 50 mg tablet Take 1 tablet (50 mg) by mouth once daily.    Historical Provider, MD   lidocaine 4 % patch Place 1 patch on the skin once daily. 7/19/24   Historical Provider, MD   magnesium oxide (Mag-Ox) 200 mg split tablet Take 2 half tablet (400 mg) by mouth once daily.    Historical Provider, MD   methocarbamol (Robaxin) 500 mg tablet Take 1 tablet (500 mg) by mouth if needed for muscle spasms. 3/13/24   Historical Provider, MD   multivitamin tablet Take 1 tablet by mouth once daily. 1/23/03   Historical Provider, MD   oxyCODONE-acetaminophen (Percocet) 5-325 mg tablet Take 1 tablet by mouth every 6 hours if needed for severe pain (7 - 10). Do not take tramadol while taking this medication. 3/17/25   Mike Barba,    pantoprazole (ProtoNix) 40 mg EC tablet Take 1 tablet (40 mg) by mouth once daily in the morning. Take before meals. 8/26/20   Historical Provider, MD   potassium gluconate 595 mg (99 mg) tablet Take 1 tablet by mouth once daily.    Historical Provider, MD   pregabalin (Lyrica) 100 mg capsule Take 1 capsule (100 mg) by mouth once daily.  Patient taking differently: Take 1 capsule (100 mg) by mouth if needed. 3/16/23   Historical Provider, MD   spironolactone (Aldactone) 25 mg tablet Take 1 tablet (25 mg) by mouth once daily.  Patient not taking: Reported on 5/8/2025 8/18/23   Historical Provider, MD   traMADol (Ultram) 50 mg tablet Take 2 tablets (100 mg) by mouth every 8 hours if needed for moderate  pain (4 - 6).  Patient not taking: Reported on 5/8/2025 8/9/22   Historical Provider, MD   traZODone (Desyrel) 50 mg tablet Take 1 tablet (50 mg) by mouth once daily at bedtime.    Historical Provider, MD RITA SALINAS Physical Exam     Airway    Visit Vitals  OB Status Postmenopausal   Smoking Status Never       DASI Risk Score    No data to display       Caprini DVT Assessment      Flowsheet Row ED to Hosp-Admission (Discharged) from 12/10/2023 in [facility] 9 with Aubrey Ambrose MD   DVT Score (IF A SCORE IS NOT CALCULATING, MUST SELECT A BMI TO COMPLETE) 5 filed at 12/10/2023 2138   BMI (BMI MUST BE CHOSEN) 31-40 (Obesity) filed at 12/10/2023 2138   RETIRED: History --  [COVID] filed at 12/10/2023 2138   RETIRED: Age 60-75 years filed at 12/10/2023 2138          Modified Frailty Index    No data to display       [id number] Stroke Risk Points  Current as of just now        4 0 to 9 Points:      No Change          The [id number] risk score (Lip CHAYO, et al. 2009. © 2010 [employer]) quantifies the risk of stroke for a patient with atrial fibrillation. For patients without atrial fibrillation or under the age of 18 this score appears as N/A. Higher score values generally indicate higher risk of stroke.          Points Metrics   1 Has Congestive Heart Failure:  Yes      Patients with congestive heart failure get 1 point.    Current as of just now   1 Has Hypertension:  Yes      Patients with hypertension get 1 point.    Current as of just now   1 Age:  70     Patients 65 to 74 years old get 1 point, or patients 75 years and older get 2 points.    Current as of just now   0 Has Diabetes:  No     Patients with diabetes get 1 point.    Current as of just now   0 Had Stroke:  No  Had TIA:  No  Had Thromboembolism:  No     Patients who have had a stroke, TIA, or thromboembolism get 2 points.    Current as of just now   0 Has Vascular Disease:  No     Patients with  vascular disease get 1 point.    Current as of just now   1 Clinically Relevant Sex:  Female     Patients with a clinically relevant sex of Female get 1 point.    Current as of just now             Revised Cardiac Risk Index    No data to display       Apfel Simplified Score    No data to display       Risk Analysis Index Results This Encounter    No data found in the last 10 encounters.       Prodigy: High Risk  Total Score: 15              Prodigy Age Score      Prodigy Previous Opioid Use Score           ARISCAT Score for Postoperative Pulmonary Complications    No data to display       Palumbo Perioperative Risk for Myocardial Infarction or Cardiac Arrest (NIKKI)    No data to display     --Testing/Diagnostic:        - EK25  Normal sinus rhythm  Left axis deviation  Abnormal ECG      - Echo: 23 at [facility]  CONCLUSIONS:   - Exam indication: pericardial effusion   - The left ventricle is normal in size. Left ventricular systolic function is   normal. EF = 67 ± 5% (2D biplane)   - Exam was compared with the prior  echocardiographic exam performed on 22    with no significant change . No pericardial effus       - Stress Test: 19 at [facility] See media  1. SPECT Perfusion Study: Normal.    2. There is no scintigraphic evidence for inducible ischemia.    3. No evidence of scarred myocardium.    4. Functional capacity N/A (pharmacological).    5. Left ventricle is normal in size. The left ventricle systolic function is normal.    6. Right ventricle is normal in size. The right ventricle systolic function is normal.    7. This is a low risk scan.           Gated Stress FBP    LVEF % 68       - Cardiac Cath: 24 at [facility]  LEFT MAIN:  This is a large caliber, medium length, normal vessel.     LEFT ANTERIOR DESCENDING:  This is a large caliber, normal vessel advancing to the  apex.     CIRCUMFLEX:  This is a large caliber, normal vessel.     RIGHT CORONARY ARTERY:  This is a large caliber, dominant  normal vessel.     SUMMARY:     Normal coronary arteries.       - XR Thoraci Spine: 05/08/25  IMPRESSION:  Postsurgical changes of the thoracolumbar spine with intact hardware.  Spinal leads project over the spinal canal at the T7-T8 level.  Moderate spondylosis      - CT Head: 01/21/25  IMPRESSION:  CT HEAD:  1. No acute intracranial abnormality or calvarial fracture.   CT CERVICAL SPINE:  1. No acute fracture or traumatic malalignment of the cervical spine.  2. Uncomplicated anterior and posterior cervical fusions of C4-C5.  3. Spondylotic changes of the spine as detailed above.      - CT Chest: 07/17/24  IMPRESSION:     1. No evidence of pulmonary embolism.     2.  Mild opacity in left upper lobe lingular segment corresponds with  chest x-ray earlier today. This could represent atelectasis versus mild infection.     3. Bilateral mild scattered ground glass opacities which may represent edema or infection.     4.  Mild coronary artery calcified plaque.       - Dimer: 730 on 02/16/25      - MR Lumbar Spine: 03/17/25  IMPRESSION:  1. No significant central canal stenosis is noted. The images are  significantly degraded by metallic artifact related to the posterior  fusion devices making evaluation of the lateral recesses and foramina suboptimal.      2. The postoperative collection of fluid at the laminectomy site does not compress the thecal sac.      Patient Specialist/PCP:                                                                                                             Cardiology: Tati Jiménez CNP 01/09/25 follow-up visit regarding paroxysmal atrial fibrillation   Per note:  Paroxysmal atrial fibrillation   - [id number]: 2 (Age, female)  - ECG today demonstrates sinus bradycardia VR 63 QTc 440 ms  - Tolerating Flecianide quite well with overall minimal recurrence of atrail fibrillation   - Continue with Flecainide 50 mg BID as prescribed.   - Follow up in 6 months.     PCP: Luke Reagan 03/24/25 seen  for annual wellness visit   Hx of afib, orthostatic hypotension, vasovagal syncope, s/p sleeve gastrectomy ~20 years ago, OA, back pain s/p several surgeries, DDD, history of bowel malrotation s/p surgery in the past, chronic lung issue     HemeOnc: Armida Green 09/17/24 seen for normocytic anemia.  She has history of iron deficiency and history of gastric sleeve surgery. B12 levels also on low side in the past. She has been on oral iron and oral B12 for several months. She got IV iron x1 on 8/1/24 and responded well symptomatically as well but now is starting to feel fatigued again.        Allergist: Margoth Lomax 08/15/24 seen for allergy evaluation  She reports having eye irritation about a few weeks ago.  It lasted a few days.  Thought it was pink eye.  Treated with tobramycin.      Neuro Sx: Mike Quigley 05/08/25 follow up with continued mid thoracic back pain radiating bilaterally to her shoulder blades however has worsening low back pain approximately L5-S1 extending bilaterally right greater than left.     I offered the option of surgery that would consist of a exploration of prior T10-Pelvis fusion with revision of amanda fracture at L3-4 and SI fusion bilaterally as well as revision posterolateral arthrodesis L5-S1.         ____________________________________________________________  **Data input only. No medications, history or providers verified         Shantal Jett LPN  Preadmission Testing        Nurse Plan of Action:  Cardiac/Eliquis recommendation requested          Assessment and Plan:     {[facility] EMBEDDED ASSESSMENT AND PLAN:36008}             [1]   Past Medical History:  Diagnosis Date    Arrhythmia     Asthma     GERD (gastroesophageal reflux disease)     Personal history of other diseases of the circulatory system 08/13/2021    History of atrial fibrillation    Personal history of other diseases of the musculoskeletal system and connective tissue 08/13/2021    History of degenerative disc  disease    Personal history of other diseases of the respiratory system 08/13/2021    History of bronchitis    Personal history of other endocrine, nutritional and metabolic disease     History of hypercholesterolemia   [2]   Past Surgical History:  Procedure Laterality Date    APPENDECTOMY  08/24/2017    Appendectomy    CERVICAL FUSION  08/24/2017    Cervical Vertebral Fusion    CHOLECYSTECTOMY  08/24/2017    Cholecystectomy    COLON SURGERY  08/13/2021    Colon Surgery    LUMBAR LAMINECTOMY  08/24/2017    Laminectomy Lumbar    OTHER SURGICAL HISTORY  08/24/2017    Spinal Stereotaxis Stimulation Of Cord   [3] No family history on file.  [4]   Allergies  Allergen Reactions    Colchicine Other    Fluticasone Propion-Salmeterol Angioedema and Unknown    Erythromycin Rash     Periorbital dermatitis - worsening sx    Hydroxychloroquine Diarrhea and Hives    Morphine GI Upset and Nausea/vomiting    Nickel Rash    Sulfa (Sulfonamide Antibiotics) Rash and Unknown

## 2025-06-17 ENCOUNTER — PRE-ADMISSION TESTING (OUTPATIENT)
Dept: PREADMISSION TESTING | Facility: HOSPITAL | Age: 70
End: 2025-06-17
Payer: MEDICARE

## 2025-06-17 ENCOUNTER — HOSPITAL ENCOUNTER (OUTPATIENT)
Dept: RADIOLOGY | Facility: HOSPITAL | Age: 70
Discharge: HOME | End: 2025-06-17
Payer: MEDICARE

## 2025-06-17 VITALS
HEIGHT: 65 IN | SYSTOLIC BLOOD PRESSURE: 109 MMHG | HEART RATE: 61 BPM | OXYGEN SATURATION: 96 % | BODY MASS INDEX: 33.34 KG/M2 | WEIGHT: 200.1 LBS | DIASTOLIC BLOOD PRESSURE: 72 MMHG | TEMPERATURE: 98.1 F

## 2025-06-17 DIAGNOSIS — M54.42 CHRONIC BILATERAL LOW BACK PAIN WITH BILATERAL SCIATICA: ICD-10-CM

## 2025-06-17 DIAGNOSIS — G89.29 CHRONIC BILATERAL LOW BACK PAIN WITH BILATERAL SCIATICA: ICD-10-CM

## 2025-06-17 DIAGNOSIS — Z01.818 PREOPERATIVE EXAMINATION: Primary | ICD-10-CM

## 2025-06-17 DIAGNOSIS — M54.41 CHRONIC BILATERAL LOW BACK PAIN WITH BILATERAL SCIATICA: ICD-10-CM

## 2025-06-17 DIAGNOSIS — R79.9 ABNORMAL FINDING OF BLOOD CHEMISTRY, UNSPECIFIED: ICD-10-CM

## 2025-06-17 LAB
ABO GROUP (TYPE) IN BLOOD: NORMAL
ANION GAP SERPL CALC-SCNC: 15 MMOL/L (ref 10–20)
ANTIBODY SCREEN: NORMAL
BASOPHILS # BLD AUTO: 0.03 X10*3/UL (ref 0–0.1)
BASOPHILS NFR BLD AUTO: 0.5 %
BUN SERPL-MCNC: 16 MG/DL (ref 6–23)
CALCIUM SERPL-MCNC: 9.7 MG/DL (ref 8.6–10.6)
CHLORIDE SERPL-SCNC: 103 MMOL/L (ref 98–107)
CO2 SERPL-SCNC: 27 MMOL/L (ref 21–32)
CREAT SERPL-MCNC: 1.14 MG/DL (ref 0.5–1.05)
EGFRCR SERPLBLD CKD-EPI 2021: 51 ML/MIN/1.73M*2
EOSINOPHIL # BLD AUTO: 0.2 X10*3/UL (ref 0–0.4)
EOSINOPHIL NFR BLD AUTO: 3.1 %
ERYTHROCYTE [DISTWIDTH] IN BLOOD BY AUTOMATED COUNT: 13.1 % (ref 11.5–14.5)
EST. AVERAGE GLUCOSE BLD GHB EST-MCNC: 103 MG/DL
GLUCOSE SERPL-MCNC: 95 MG/DL (ref 74–99)
HBA1C MFR BLD: 5.2 % (ref ?–5.7)
HCT VFR BLD AUTO: 43.3 % (ref 36–46)
HGB BLD-MCNC: 13.7 G/DL (ref 12–16)
IMM GRANULOCYTES # BLD AUTO: 0.01 X10*3/UL (ref 0–0.5)
IMM GRANULOCYTES NFR BLD AUTO: 0.2 % (ref 0–0.9)
LYMPHOCYTES # BLD AUTO: 1.87 X10*3/UL (ref 0.8–3)
LYMPHOCYTES NFR BLD AUTO: 29.4 %
MCH RBC QN AUTO: 28.7 PG (ref 26–34)
MCHC RBC AUTO-ENTMCNC: 31.6 G/DL (ref 32–36)
MCV RBC AUTO: 91 FL (ref 80–100)
MONOCYTES # BLD AUTO: 0.36 X10*3/UL (ref 0.05–0.8)
MONOCYTES NFR BLD AUTO: 5.7 %
NEUTROPHILS # BLD AUTO: 3.88 X10*3/UL (ref 1.6–5.5)
NEUTROPHILS NFR BLD AUTO: 61.1 %
NRBC BLD-RTO: 0 /100 WBCS (ref 0–0)
PLATELET # BLD AUTO: 265 X10*3/UL (ref 150–450)
POTASSIUM SERPL-SCNC: 5 MMOL/L (ref 3.5–5.3)
PREALB SERPL-MCNC: 28.7 MG/DL (ref 18–40)
RBC # BLD AUTO: 4.78 X10*6/UL (ref 4–5.2)
RH FACTOR (ANTIGEN D): NORMAL
SODIUM SERPL-SCNC: 140 MMOL/L (ref 136–145)
WBC # BLD AUTO: 6.4 X10*3/UL (ref 4.4–11.3)

## 2025-06-17 PROCEDURE — 86923 COMPATIBILITY TEST ELECTRIC: CPT

## 2025-06-17 PROCEDURE — 36415 COLL VENOUS BLD VENIPUNCTURE: CPT

## 2025-06-17 PROCEDURE — 71046 X-RAY EXAM CHEST 2 VIEWS: CPT

## 2025-06-17 PROCEDURE — 87081 CULTURE SCREEN ONLY: CPT

## 2025-06-17 PROCEDURE — 80048 BASIC METABOLIC PNL TOTAL CA: CPT | Performed by: STUDENT IN AN ORGANIZED HEALTH CARE EDUCATION/TRAINING PROGRAM

## 2025-06-17 PROCEDURE — 84134 ASSAY OF PREALBUMIN: CPT | Performed by: STUDENT IN AN ORGANIZED HEALTH CARE EDUCATION/TRAINING PROGRAM

## 2025-06-17 PROCEDURE — 86900 BLOOD TYPING SEROLOGIC ABO: CPT

## 2025-06-17 PROCEDURE — 99204 OFFICE O/P NEW MOD 45 MIN: CPT

## 2025-06-17 PROCEDURE — 85025 COMPLETE CBC W/AUTO DIFF WBC: CPT | Performed by: STUDENT IN AN ORGANIZED HEALTH CARE EDUCATION/TRAINING PROGRAM

## 2025-06-17 PROCEDURE — 83036 HEMOGLOBIN GLYCOSYLATED A1C: CPT

## 2025-06-17 RX ORDER — CHLORHEXIDINE GLUCONATE ORAL RINSE 1.2 MG/ML
15 SOLUTION DENTAL AS NEEDED
Qty: 120 ML | Refills: 0 | Status: SHIPPED | OUTPATIENT
Start: 2025-06-17 | End: 2025-06-19

## 2025-06-17 RX ORDER — CHLORHEXIDINE GLUCONATE 40 MG/ML
SOLUTION TOPICAL DAILY PRN
Qty: 473 ML | Refills: 0 | Status: SHIPPED | OUTPATIENT
Start: 2025-06-17

## 2025-06-17 RX ORDER — DICLOFENAC SODIUM 75 MG/1
75 TABLET, DELAYED RELEASE ORAL 2 TIMES DAILY
COMMUNITY
Start: 2025-06-10

## 2025-06-17 ASSESSMENT — ENCOUNTER SYMPTOMS
HEMOPTYSIS: 0
SINUS CONGESTION: 0
MYALGIAS: 1
DIARRHEA: 0
NECK MASS: 0
EYE DISCHARGE: 0
DYSPNEA AT REST: 0
SKIN CHANGES: 0
CONSTIPATION: 0
BLOOD IN STOOL: 0
DIFFICULTY URINATING: 0
EXCESSIVE BLEEDING: 0
VERTIGO: 0
LIGHT-HEADEDNESS: 0
RHINORRHEA: 0
CONFUSION: 0
COUGH: 0
WHEEZING: 0
NAUSEA: 0
ABDOMINAL DISTENTION: 0
FEVER: 0
DYSURIA: 0
ARTHRALGIAS: 0
WEAKNESS: 0
BRUISES/BLEEDS EASILY: 0
POLYDIPSIA: 0
LIMITED RANGE OF MOTION: 0
NECK SWELLING: 0
DOUBLE VISION: 0
WOUND: 0
TROUBLE SWALLOWING: 0
PND: 0
VOICE CHANGE: 0
NECK STIFFNESS: 0
DYSPNEA WITH EXERTION: 0
UNEXPECTED WEIGHT CHANGE: 0
VISUAL CHANGE: 0
JOINT SWELLING: 0
CHILLS: 0
PALPITATIONS: 0
ABDOMINAL PAIN: 0
NECK PAIN: 0
NERVOUS/ANXIOUS: 0
TREMORS: 0
VOMITING: 0
SHORTNESS OF BREATH: 0
NUMBNESS: 0

## 2025-06-17 ASSESSMENT — DUKE ACTIVITY SCORE INDEX (DASI)
CAN YOU WALK INDOORS, SUCH AS AROUND YOUR HOUSE: YES
CAN YOU PARTICIPATE IN STRENOUS SPORTS LIKE SWIMMING, SINGLES TENNIS, FOOTBALL, BASKETBALL, OR SKIING: NO
CAN YOU HAVE SEXUAL RELATIONS: YES
CAN YOU DO HEAVY WORK AROUND THE HOUSE LIKE SCRUBBING FLOORS OR LIFTING AND MOVING HEAVY FURNITURE: NO
CAN YOU DO LIGHT WORK AROUND THE HOUSE LIKE DUSTING OR WASHING DISHES: YES
CAN YOU CLIMB A FLIGHT OF STAIRS OR WALK UP A HILL: YES
CAN YOU DO YARD WORK LIKE RAKING LEAVES, WEEDING OR PUSHING A MOWER: NO
CAN YOU RUN A SHORT DISTANCE: NO
TOTAL_SCORE: 20.7
CAN YOU TAKE CARE OF YOURSELF (EAT, DRESS, BATHE, OR USE TOILET): YES
CAN YOU PARTICIPATE IN MODERATE RECREATIONAL ACTIVITIES LIKE GOLF, BOWLING, DANCING, DOUBLES TENNIS OR THROWING A BASEBALL OR FOOTBALL: NO
CAN YOU WALK A BLOCK OR TWO ON LEVEL GROUND: YES
DASI METS SCORE: 5.3
CAN YOU DO MODERATE WORK AROUND THE HOUSE LIKE VACUUMING, SWEEPING FLOORS OR CARRYING GROCERIES: NO

## 2025-06-17 ASSESSMENT — LIFESTYLE VARIABLES: SMOKING_STATUS: NONSMOKER

## 2025-06-17 ASSESSMENT — PAIN - FUNCTIONAL ASSESSMENT: PAIN_FUNCTIONAL_ASSESSMENT: 0-10

## 2025-06-17 ASSESSMENT — PAIN SCALES - GENERAL: PAINLEVEL_OUTOF10: 8

## 2025-06-17 NOTE — CPM/PAT H&P
CPM/PAT Evaluation       Name: Lanie Pablo (Lanie Pablo)  /Age: 1955/70 y.o.     Visit Type:   In-Person       Chief Complaint: perioperative evaluation    HPI HPI: 69 y/o female scheduled for Exploration of Prior T10-Pelvis Fusion with revision of amanda fracture at L3-L4 and S1 Fusion bilaterally as well as revision posterolateral arthrodesis L5-S1  on 2025  secondary to Chronic bilateral low back pain with bilateral sciatica  with Dr. Mike Quigley who referred to CPM.  Presents to CPM today for perioperative risk stratification and optimization. PMHX includes Depression, HLD, Artial Fibrillation, orthostatic hypotension, vasovagal syncope, CHF, Asthma, CKD3,  Iron deficiency anemia, B12 deficiency, GERD, s/p gastric sleeve 2005, bowel incontinence, Chronic bilateral low back pain with bilateral sciatica, DDD, OA,.    PCP: Luke Reagan MD   Specialists:   Cardiology- RICK Arnold.CNP   Heme/Onc- MD Margoth Hernandez MD   Allergy/Immunology-  Neurosurgery- Mike Quigley MD             Medical History[1]    Surgical History[2]    Patient Sexual activity questions deferred to the physician.    Family History[3]    Allergies[4]    Prior to Admission medications    Medication Sig Start Date End Date Taking? Authorizing Provider   acebutolol (Sectral) 200 mg capsule Take 1 capsule (200 mg) by mouth 2 times a day.  Patient not taking: Reported on 2025 10/14/20   Historical Provider, MD   albuterol 2.5 mg /3 mL (0.083 %) nebulizer solution Inhale 3 mL (2.5 mg) every 4 hours if needed. 24   Historical Provider, MD   albuterol 90 mcg/actuation inhaler INHALE 2 PUFFS BY MOUTH AS DIRECTED EVERY 4 HOURS AS NEEDED FOR WHEEZING AND FOR SHORTNESS OF BREATH 23   Historical Provider, MD   apixaban (Eliquis) 5 mg tablet Take 1 tablet (5 mg) by mouth twice a day.  Patient not taking: Reported on 2025   Historical Provider, MD   biotin 1 mg capsule Take 1 tablet  by mouth once daily.    Historical Provider, MD Robbins Ellipta 100-25 mcg/dose inhaler Inhale 1 puff once daily.    Historical Provider, MD   celecoxib (CeleBREX) 100 mg capsule Take 1 capsule (100 mg) by mouth 2 times a day as needed.    Historical Provider, MD   cholecalciferol (Vitamin D-3) 25 MCG (1000 UT) tablet Take 1 tablet (1,000 Units) by mouth once daily.    Historical Provider, MD   citalopram (CeleXA) 40 mg tablet Take 1 tablet (40 mg) by mouth once daily.    Historical Provider, MD   cyanocobalamin (Vitamin B-12) 500 mcg tablet Take 1 tablet (500 mcg) by mouth once daily. 2/26/24   Historical Provider, MD   cyclobenzaprine (Flexeril) 10 mg tablet Take 1 tablet (10 mg) by mouth 2 times a day as needed. 9/23/20   Historical Provider, MD   docusate sodium (Colace) 100 mg capsule Take 1 capsule (100 mg) by mouth 2 times a day as needed.    Historical Provider, MD   dofetilide (Tikosyn) 250 mcg capsule Take 1 capsule (250 mcg) by mouth twice a day.  Patient not taking: Reported on 5/8/2025 5/17/23   Historical Provider, MD   FeroSuL tablet Take 1 tablet (325 mg) by mouth early in the morning.. 1/11/24   Historical Provider, MD   flecainide (Tambocor) 50 mg tablet Take 1 tablet (50 mg) by mouth once daily.    Historical Provider, MD   lidocaine 4 % patch Place 1 patch on the skin once daily. 7/19/24   Historical Provider, MD   magnesium oxide (Mag-Ox) 200 mg split tablet Take 2 half tablet (400 mg) by mouth once daily.    Historical Provider, MD   methocarbamol (Robaxin) 500 mg tablet Take 1 tablet (500 mg) by mouth if needed for muscle spasms. 3/13/24   Historical Provider, MD   multivitamin tablet Take 1 tablet by mouth once daily. 1/23/03   Historical Provider, MD   oxyCODONE-acetaminophen (Percocet) 5-325 mg tablet Take 1 tablet by mouth every 6 hours if needed for severe pain (7 - 10). Do not take tramadol while taking this medication. 3/17/25   Mike Barba, DO   pantoprazole (ProtoNix) 40 mg EC  tablet Take 1 tablet (40 mg) by mouth once daily in the morning. Take before meals. 8/26/20   Historical Provider, MD   potassium gluconate 595 mg (99 mg) tablet Take 1 tablet by mouth once daily.    Historical Provider, MD   pregabalin (Lyrica) 100 mg capsule Take 1 capsule (100 mg) by mouth once daily.  Patient taking differently: Take 1 capsule (100 mg) by mouth if needed. 3/16/23   Historical Provider, MD   spironolactone (Aldactone) 25 mg tablet Take 1 tablet (25 mg) by mouth once daily.  Patient not taking: Reported on 5/8/2025 8/18/23   Historical Provider, MD   traMADol (Ultram) 50 mg tablet Take 2 tablets (100 mg) by mouth every 8 hours if needed for moderate pain (4 - 6).  Patient not taking: Reported on 5/8/2025 8/9/22   Historical Provider, MD   traZODone (Desyrel) 50 mg tablet Take 1 tablet (50 mg) by mouth once daily at bedtime.    Historical Provider, MD SALINAS ROS:   Constitutional:    no fever   no chills   no unexpected weight change  Neuro/Psych:    no numbness   no weakness   no light-headedness   no tremors   no confusion   not nervous/anxious   no self-injury   no suicidal ideation  Eyes:    no discharge   no vision loss   no diplopia   no visual disturbance   use of corrective lenses  Ears:    no ear pain   no hearing loss   no vertigo   no tinnitus   no hearing aides  Nose:    no nasal discharge   no sinus congestion   no epistaxis  Mouth:    no dental issues   no mouth pain   no oral bleeding   no mouth lesions  Throat:    no throat pain   no dysphagia   no voice change  Neck:    no neck pain   neck swelling   no neck stiffness   no neck masses  Cardio:    no chest pain   no palpitations   no peripheral edema   no dyspnea   no NARANJO   no paroxysmal nocturnal dyspnea  Respiratory:    no cough   no wheezing   no hemoptysis   no shortness of breath  Endocrine:    no cold intolerance   no heat intolerance   no polydipsia  GI:    no abdominal distention   no abdominal pain   no constipation    no diarrhea   no nausea   no vomiting   no blood in stool  :    no difficulty urinating   no dysuria   no oliguria   no polyuria  Musculoskeletal:    no arthralgias   myalgias (back pain 8/10)   no decreased ROM   no swelling  Hematologic:    does not bruise/bleed easily   no excessive bleeding   no history of blood transfusion   no blood clots  Skin:   no skin changes   no sores/wound   no rash      Physical Exam  Vitals reviewed. Physical exam within normal limits.   Constitutional:       General: She is not in acute distress.     Appearance: Normal appearance. She is normal weight. She is not ill-appearing, toxic-appearing or diaphoretic.   HENT:      Head: Normocephalic.      Nose: Nose normal. No congestion or rhinorrhea.      Mouth/Throat:      Mouth: Mucous membranes are moist.      Pharynx: Oropharynx is clear. No oropharyngeal exudate or posterior oropharyngeal erythema.   Eyes:      General: No scleral icterus.        Right eye: No discharge.         Left eye: No discharge.      Conjunctiva/sclera: Conjunctivae normal.   Neck:      Vascular: No carotid bruit.   Cardiovascular:      Rate and Rhythm: Normal rate and regular rhythm.      Pulses: Normal pulses.      Heart sounds: Normal heart sounds. No murmur heard.     No friction rub. No gallop.   Pulmonary:      Effort: Pulmonary effort is normal. No respiratory distress.      Breath sounds: Normal breath sounds. No stridor. No wheezing, rhonchi or rales.   Chest:      Chest wall: No tenderness.   Musculoskeletal:         General: No swelling or tenderness. Normal range of motion.      Cervical back: Normal range of motion and neck supple. No rigidity or tenderness.   Skin:     General: Skin is warm and dry.   Neurological:      General: No focal deficit present.      Mental Status: She is alert.      Motor: No weakness.   Psychiatric:         Mood and Affect: Mood normal.         Behavior: Behavior normal.         Thought Content: Thought content  "normal.         Judgment: Judgment normal.          PAT AIRWAY:   Airway:     Mallampati::  II    TM distance::  >3 FB    Neck ROM::  Full   upper dentures and lower dentures        Visit Vitals  /72   Pulse 61   Temp 36.7 °C (98.1 °F) (Temporal)   Ht 1.638 m (5' 4.5\")   Wt 90.8 kg (200 lb 1.6 oz)   SpO2 96%   BMI 33.82 kg/m²   OB Status Postmenopausal   Smoking Status Never   BSA 2.03 m²       DASI Risk Score      Flowsheet Row Pre-Admission Testing from [facility]/17/2025 in [facility]   Can you take care of yourself (eat, dress, bathe, or use toilet)?  2.75 filed at 06/17/2025 1303   Can you walk indoors, such as around your house? 1.75 filed at 06/17/2025 1303   Can you walk a block or two on level ground?  2.75 filed at 06/17/2025 1303   Can you climb a flight of stairs or walk up a hill? 5.5 filed at 06/17/2025 1303   Can you run a short distance? 0 filed at 06/17/2025 1303   Can you do light work around the house like dusting or washing dishes? 2.7 filed at 06/17/2025 1303   Can you do moderate work around the house like vacuuming, sweeping floors or carrying groceries? 0 filed at 06/17/2025 1303   Can you do heavy work around the house like scrubbing floors or lifting and moving heavy furniture?  0 filed at 06/17/2025 1303   Can you do yard work like raking leaves, weeding or pushing a mower? 0 filed at 06/17/2025 1303   Can you have sexual relations? 5.25 filed at 06/17/2025 1303   Can you participate in moderate recreational activities like golf, bowling, dancing, doubles tennis or throwing a baseball or football? 0 filed at 06/17/2025 1303   Can you participate in strenous sports like swimming, singles tennis, football, basketball, or skiing? 0 filed at 06/17/2025 1303   DASI SCORE 20.7 filed at 06/17/2025 1303   METS Score (Will be calculated only when all the questions are answered) 5.3 filed at 06/17/2025 1303          Caprini DVT Assessment      Flowsheet Row Pre-Admission Testing from " 6/17/2025 in [facility] ED to Hosp-Admission (Discharged) from 12/10/2023 in [facility] 9 with Aubrey Ambrose MD   DVT Score (IF A SCORE IS NOT CALCULATING, MUST SELECT A BMI TO COMPLETE) 10 filed at 06/17/2025 1501 5 filed at 12/10/2023 2138   Surgical Factors Major surgery planned, lasting over 3 hours filed at 06/17/2025 1501 --   BMI (BMI MUST BE CHOSEN) 31-40 (Obesity) filed at 06/17/2025 1501 31-40 (Obesity) filed at 12/10/2023 2138   RETIRED: History -- --  [COVID] filed at 12/10/2023 2138   RETIRED: Age -- 60-75 years filed at 12/10/2023 2138          Modified Frailty Index      Flowsheet Row Pre-Admission Testing from 6/17/2025 in [facility]   Non-independent functional status (problems with dressing, bathing, personal grooming, or cooking) 0 filed at 06/17/2025 1502   History of diabetes mellitus  0 filed at 06/17/2025 1502   History of COPD 0 filed at 06/17/2025 1502   History of CHF 0.0909 filed at 06/17/2025 1502   History of MI 0 filed at 06/17/2025 1502   History of Percutaneous Coronary Intervention, Cardiac Surgery, or Angina No filed at 06/17/2025 1502   Hypertension requiring the use of medication  0.0909 filed at 06/17/2025 1502   Peripheral vascular disease 0 filed at 06/17/2025 1502   Impaired sensorium (cognitive impairement or loss, clouding, or delirium) 0 filed at 06/17/2025 1502   TIA or CVA withouy residual deficit 0 filed at 06/17/2025 1502   Cerebrovascular accident with deficit 0 filed at 06/17/2025 1502   Modified Frailty Index Calculator .1818 filed at 06/17/2025 1502          [id number] Stroke Risk Points  Current as of just now        4 0 to 9 Points:      No Change          The [id number] risk score (Lip CHAYO, et al. 2009. © 2010 [employer]) quantifies the risk of stroke for a patient with atrial fibrillation. For patients without atrial fibrillation or under the age of 18 this score appears as N/A.  Higher score values generally indicate higher risk of stroke.          Points Metrics   1 Has Congestive Heart Failure:  Yes      Patients with congestive heart failure get 1 point.    Current as of just now   1 Has Hypertension:  Yes      Patients with hypertension get 1 point.    Current as of just now   1 Age:  70     Patients 65 to 74 years old get 1 point, or patients 75 years and older get 2 points.    Current as of just now   0 Has Diabetes:  No     Patients with diabetes get 1 point.    Current as of just now   0 Had Stroke:  No  Had TIA:  No  Had Thromboembolism:  No     Patients who have had a stroke, TIA, or thromboembolism get 2 points.    Current as of just now   0 Has Vascular Disease:  No     Patients with vascular disease get 1 point.    Current as of just now   1 Clinically Relevant Sex:  Female     Patients with a clinically relevant sex of Female get 1 point.    Current as of just now             Revised Cardiac Risk Index      Flowsheet Row Pre-Admission Testing from 6/17/2025 in [facility]   High-Risk Surgery (Intraperitoneal, Intrathoracic,Suprainguinal vascular) 0 filed at 06/17/2025 1427   History of ischemic heart disease (History of MI, History of positive exercuse test, Current chest paint considered due to myocardial ischemia, Use of nitrate therapy, ECG with pathological Q Waves) 0 filed at 06/17/2025 1427   History of congestive heart failure (pulmonary edemia, bilateral rales or S3 gallop, Paroxysmal nocturnal dyspnea, CXR showing pulmonary vascular redistribution) 1 filed at 06/17/2025 1427   History of cerebrovascular disease (Prior TIA or stroke) 0 filed at 06/17/2025 1427   Pre-operative insulin treatment 0 filed at 06/17/2025 1427   Pre-operative creatinine>2 mg/dl 0 filed at 06/17/2025 1427   Revised Cardiac Risk Calculator 1 filed at 06/17/2025 1427          Apfel Simplified Score      Flowsheet Row Pre-Admission Testing from 6/17/2025 in [facility]   Smoking status 1 filed at 06/17/2025 1501   History of motion sickness or PONV  0 filed at 06/17/2025 1501   Use of postoperative opioids 1 filed at 06/17/2025 1501   Gender - Female 1=Yes filed at 06/17/2025 1501   Apfel Simplified Score Calculator 3 filed at 06/17/2025 1501          Risk Analysis Index Results This Encounter    No data found in the last 10 encounters.       Stop Bang Score      Flowsheet Row Pre-Admission Testing from 6/17/2025 in [facility]   Do you snore loudly? 1 filed at 06/17/2025 1304   Do you often feel tired or fatigued after your sleep? 0 filed at 06/17/2025 1304   Has anyone ever observed you stop breathing in your sleep? 0 filed at 06/17/2025 1304   Do you have or are you being treated for high blood pressure? 0 filed at 06/17/2025 1304   Recent BMI (Calculated) 35.6 filed at 06/17/2025 1304   Is BMI greater than 35 kg/m2? 1=Yes filed at 06/17/2025 1304   Age older than 50 years old? 1=Yes filed at 06/17/2025 1304   Is your neck circumference greater than 17 inches (Male) or 16 inches (Female)? 0 filed at 06/17/2025 1304   Gender - Male 0=No filed at 06/17/2025 1304   STOP-BANG Total Score 3 filed at 06/17/2025 1304          Prodigy: High Risk  Total Score: 15              Prodigy Age Score      Prodigy Previous Opioid Use Score           ARISCAT Score for Postoperative Pulmonary Complications      Flowsheet Row Pre-Admission Testing from 6/17/2025 in [facility]   Age Calculated Score 3 filed at 06/17/2025 1501   Preoperative SpO2 0 filed at 06/17/2025 1501   Respiratory infection in the last month Either upper or lower (i.e., URI, bronchitis, pneumonia), with fever and antibiotic treatment 0 filed at 06/17/2025 1501   Preoperative anemia (Hgb less than 10 g/dl) 0 filed at 06/17/2025 1501   Surgical incision  0 filed at 06/17/2025 1501   Duration of surgery  23 filed at 06/17/2025 1501   Emergency Procedure  0 filed at 06/17/2025 1501   DEDE  "Total Score  26 filed at 2025 1501          [place] Perioperative Risk for Myocardial Infarction or Cardiac Arrest (NIKKI)    No data to display           Assessment and Plan:   Anesthesia/Airway:  No anesthesia complications        Neuro:    #Depression, -medicated with Celexa (continue), trazodone (continue).  No further preoperative intervention.     Patient is at an increased risk for post operative delirium secondary to age >/= 65, depression, and type and duration of surgery.  Preoperative brain exercise educational handout provided to patient.    The patient is at an increased risk for perioperative stroke secondary to increased age, HLD, female sex , general anesthesia, and op time >2.5 hours.       HEENT/Airway:    No HEENT diagnosis or significant findings on chart review or clinical presentation and evaluation. No further preoperative testing/intervention indicated at this time.      Cardiovascular:    #HLD, Artial Fibrillation, orthostatic hypotension, vasovagal syncope, CHF,- medicated with potassium, acebutolol (continue both) and follows with cardiology. BP today is 109/72 and denies cardiovascular symptoms. Physical exam is benign.  Patient reports they have not had a syncopal episode in a couple years. METS is >4 and scheduled for non-cardiac surgery.  \"Low Risk\" per Cardiology (scanned to media), no additional preoperative testing is currently indicated.    METS are 5.3    RCRI  1 which is 6% 30 day risk of MACE (risk for cardiac death, nonfatal myocardial infarction, and nonfactal cardiac arrest    NIKKI score which indicates a   0.2 % risk of intraoperative or 30-day postoperative MACE     EK25  Normal sinus rhythm  Left axis deviation  Abnormal ECG        Echo: 23 at [facility]  CONCLUSIONS:   - Exam indication: pericardial effusion   - The left ventricle is normal in size. Left ventricular systolic function is   normal. EF = 67 ± 5% (2D biplane)   - Exam was compared with the prior CC " echocardiographic exam performed on 11-17-22  with no significant change . No pericardial effus         Stress Test: 07/01/19 at [facility] See media  1. SPECT Perfusion Study: Normal.    2. There is no scintigraphic evidence for inducible ischemia.    3. No evidence of scarred myocardium.    4. Functional capacity N/A (pharmacological).    5. Left ventricle is normal in size. The left ventricle systolic function is normal.    6. Right ventricle is normal in size. The right ventricle systolic function is normal.    7. This is a low risk scan.  Gated Stress FBP  LVEF % 68         Cardiac Cath: 07/18/24 at [facility]  LEFT MAIN:  This is a large caliber, medium length, normal vessel.   LEFT ANTERIOR DESCENDING:  This is a large caliber, normal vessel advancing to the  apex.   CIRCUMFLEX:  This is a large caliber, normal vessel.   RIGHT CORONARY ARTERY:  This is a large caliber, dominant normal vessel.   SUMMARY: Normal coronary arteries.         Pulmonary:    # Asthma-patient reports her asthma is well-controlled and they do not remember the last time they used albuterol rescue inhaler.  They have never been hospitalized for asthma or had an exacerbation.  Patient denies any respiratory symptoms and physical exam is benign.  Preoperative deep breathing educational handout provided to patient.    No pulmonary diagnosis, however patient is at increased risk of perioperative complications secondary to  age > 60, obesity, heart failure, duration of surgery > 2 hours, types of anesthetic    ARISCAT:    26  points which is a intermediate (13.3%) risk of in-hospital post-op pulmonary complications     PRODIGY:  19  points which is a high risk of post op opioid induced respiratory depression episodes    STOP BANG:  3   points which is a intermediate risk for moderate to severe ALICIA    Pumonary toilet education discussed, patient also provided deep breathing exercises and incentive spirometry educational handout      Renal:   #CKD3 -patient  reports they were told they had kidney dysfunction approximately 2 years ago during their ablation however they said that they were told this recovered.  They are unaware they were diagnosed with chronic kidney disease.  Per lab results creatinine is within normal limits and has been for the past 3 years     Patient is at increase risk for perioperative renal complications secondary to  Age equal to or greater than 56, BMI equal to or greater than 30, HTN, use of an ace, arb, or NSAID       Endocrine:   No endocrine diagnosis or significant findings on chart review or clinical presentation and evaluation. No further testing or intervention is indicated at this time.      Hematologic/Oncology:      #Iron deficiency anemia, B12 deficiency, -repeat lab work today, last H/H was within normal limits.  Please hold supplementation on day of surgery.    Patient confirmed they would accept blood products if necessary.   Preoperative DVT educational handout provided to patient.  Caprini Score:  10  points which is a highest risk of perioperative VTE      Gastrointestinal:   #GERD, s/p gastric sleeve 2005, bowel incontinence -patient is medicated with pantoprazole (continue), Colace (holding surgery).  Patient denies any GI/ symptoms at this time.    EAT-10 score of    0  : self-perceived oropharyngeal dysphagia scale (0-40)     Apfel: 3 points 61% risk for post operative N/V      Infectious disease:     No infectious diagnosis or significant findings on chart review or clinical presentation and evaluation.   Prescription provided for CHG body wash and dental rinse. CHG use instructions reviewed and provided to patient.  Staph screen collected      Musculoskeletal:    #Chronic bilateral low back pain with bilateral sciatica, DDD, OA,  -seeking surgical intervention with neurosurgery.  Patient currently has a spinal stimulator and was directed to bring remote control on day of surgery fully charged.  Patient reports  significant pain in her back without change.      Other:     Hold Vit D supplementation day of surgery  Hold all other vitamins and supplements 7 days prior to surgery  Tylenol okay to continue, please hold Aleve/naproxen/ibuprofen/Excedrin (NSAIDs) for 7 days prior to surgery  No lotion/moisturizers or Deoderant after last shower prior to surgery        Labs ordered  Recent Results (from the past 3 weeks)   Basic Metabolic Panel    Collection Time: 06/17/25  1:20 PM   Result Value Ref Range    Glucose 95 74 - 99 mg/dL    Sodium 140 136 - 145 mmol/L    Potassium 5.0 3.5 - 5.3 mmol/L    Chloride 103 98 - 107 mmol/L    Bicarbonate 27 21 - 32 mmol/L    Anion Gap 15 10 - 20 mmol/L    Urea Nitrogen 16 6 - 23 mg/dL    Creatinine 1.14 (H) 0.50 - 1.05 mg/dL    eGFR 51 (L) >60 mL/min/1.73m*2    Calcium 9.7 8.6 - 10.6 mg/dL   Prealbumin    Collection Time: 06/17/25  1:20 PM   Result Value Ref Range    Prealbumin 28.7 18.0 - 40.0 mg/dL   CBC and Auto Differential    Collection Time: 06/17/25  1:20 PM   Result Value Ref Range    WBC 6.4 4.4 - 11.3 x10*3/uL    nRBC 0.0 0.0 - 0.0 /100 WBCs    RBC 4.78 4.00 - 5.20 x10*6/uL    Hemoglobin 13.7 12.0 - 16.0 g/dL    Hematocrit 43.3 36.0 - 46.0 %    MCV 91 80 - 100 fL    MCH 28.7 26.0 - 34.0 pg    MCHC 31.6 (L) 32.0 - 36.0 g/dL    RDW 13.1 11.5 - 14.5 %    Platelets 265 150 - 450 x10*3/uL    Neutrophils % 61.1 40.0 - 80.0 %    Immature Granulocytes %, Automated 0.2 0.0 - 0.9 %    Lymphocytes % 29.4 13.0 - 44.0 %    Monocytes % 5.7 2.0 - 10.0 %    Eosinophils % 3.1 0.0 - 6.0 %    Basophils % 0.5 0.0 - 2.0 %    Neutrophils Absolute 3.88 1.60 - 5.50 x10*3/uL    Immature Granulocytes Absolute, Automated 0.01 0.00 - 0.50 x10*3/uL    Lymphocytes Absolute 1.87 0.80 - 3.00 x10*3/uL    Monocytes Absolute 0.36 0.05 - 0.80 x10*3/uL    Eosinophils Absolute 0.20 0.00 - 0.40 x10*3/uL    Basophils Absolute 0.03 0.00 - 0.10 x10*3/uL   Hemoglobin A1C    Collection Time: 06/17/25  1:20 PM   Result Value  Ref Range    Hemoglobin A1C 5.2 See comment %    Estimated Average Glucose 103 Not Established mg/dL   Type And Screen Is this order related to pregnancy or an upcoming surgery? Yes; Where will this surgery/delivery [facility]; What is the date of the surgery? 7/1/2025; Has this patient ever had a transfusion? No; Has th...    Collection Time: 06/17/25  1:20 PM   Result Value Ref Range    ABO TYPE A     Rh TYPE POS     ANTIBODY SCREEN NEG    Coagulation Screen    Collection Time: 06/18/25 11:29 AM   Result Value Ref Range    Protime 11.9 9.8 - 12.4 seconds    INR 1.1 0.9 - 1.1    aPTT 33 26 - 36 seconds           Christiane Pedraza PA-C     Medication instructions and NPO guidelines reviewed with the patient.  All questions or concerns discussed and addressed.   The above clinical summary has been dictated with voice recognition software. It has not been proofread for grammatical errors, typographical mistakes, or other semantic inconsistencies.   All labs/imaging included on this documentation were reviewed/considered in medical decision making for perioperative planning, including labs ordered day of [facility] appointment.           [1]   Past Medical History:  Diagnosis Date    Abnormal EKG     2/16/25--Normal sinus rhythm Left axis deviation Abnormal ECG    Anemia     Arrhythmia     A-Fib on Eliquis    Arthritis     Asthma     Chronic bilateral low back pain with bilateral sciatica     seen by Dr. Mike Quigley 05/08/25    CKD (chronic kidney disease)     reports PCP was watching in the past, not in 2025    GERD (gastroesophageal reflux disease)     controlled    HL (hearing loss)     Personal history of other diseases of the musculoskeletal system and connective tissue 08/13/2021    History of degenerative disc disease    Personal history of other diseases of the respiratory system 08/13/2021    History of bronchitis    Personal history of other endocrine, nutritional and metabolic disease      History of hypercholesterolemia   [2]   Past Surgical History:  Procedure Laterality Date    APPENDECTOMY  08/24/2017    Appendectomy    CERVICAL FUSION  08/24/2017    Cervical Vertebral Fusion    CHOLECYSTECTOMY  08/24/2017    Cholecystectomy    COLON SURGERY  08/13/2021    Colon Surgery    COLONOSCOPY      LUMBAR LAMINECTOMY  08/24/2017    Laminectomy Lumbar    OTHER SURGICAL HISTORY  08/24/2017    Spinal Stereotaxis Stimulation Of Cord    STOMACH SURGERY  2005    gastric sleeve   [3]   Family History  Problem Relation Name Age of Onset    No Known Problems Mother      No Known Problems Father      Atrial fibrillation Brother     [4]   Allergies  Allergen Reactions    Nickel Swelling, Rash and Blurred vision    Colchicine Other    Fluticasone Propion-Salmeterol Angioedema and Unknown    Erythromycin Rash     Periorbital dermatitis - worsening sx    Hydroxychloroquine Diarrhea and Hives    Morphine GI Upset and Nausea/vomiting    Sulfa (Sulfonamide Antibiotics) Rash and Unknown

## 2025-06-17 NOTE — NURSING NOTE
Patient seen in PAT. Orders reviewed with PAT Provider.     Following labs obtained by documenting RN: T/S, MRSA SWAB, A1C, CBC W DIFF, PREALB, BMP, COAG  EKG COMPLETED TODAY  INFORMED BY LAB THAT COAG WAS NOT FILLED TO LINE AND VIAL WAS WASTED.  CONTACTED PATIENT AND SHE WILL GO TO [facility] PRIOR TO SURGERY DATE OF 7/1/25.  PROVIDER UPDATED WITH LAB ISSUE.       Patient discharged with: Pre-procedure instructions/AVS.    Nicole HORNEN, RN  [facility]   [facility]

## 2025-06-17 NOTE — H&P (VIEW-ONLY)
CPM/PAT Evaluation       Name: Lanie Pablo (Lanie Pablo)  /Age: 1955/70 y.o.     Visit Type:   In-Person       Chief Complaint: perioperative evaluation    HPI HPI: 69 y/o female scheduled for Exploration of Prior T10-Pelvis Fusion with revision of amanda fracture at L3-L4 and S1 Fusion bilaterally as well as revision posterolateral arthrodesis L5-S1  on 2025  secondary to Chronic bilateral low back pain with bilateral sciatica  with Dr. Mike Quigley who referred to CPM.  Presents to CPM today for perioperative risk stratification and optimization. PMHX includes Depression, HLD, Artial Fibrillation, orthostatic hypotension, vasovagal syncope, CHF, Asthma, CKD3,  Iron deficiency anemia, B12 deficiency, GERD, s/p gastric sleeve 2005, bowel incontinence, Chronic bilateral low back pain with bilateral sciatica, DDD, OA,.    PCP: Luke Reagan MD   Specialists:   Cardiology- RICK Arnold.CNP   Heme/Onc- MD Margoth Hernandez MD   Allergy/Immunology-  Neurosurgery- Mike Quigley MD             Medical History[1]    Surgical History[2]    Patient Sexual activity questions deferred to the physician.    Family History[3]    Allergies[4]    Prior to Admission medications    Medication Sig Start Date End Date Taking? Authorizing Provider   acebutolol (Sectral) 200 mg capsule Take 1 capsule (200 mg) by mouth 2 times a day.  Patient not taking: Reported on 2025 10/14/20   Historical Provider, MD   albuterol 2.5 mg /3 mL (0.083 %) nebulizer solution Inhale 3 mL (2.5 mg) every 4 hours if needed. 24   Historical Provider, MD   albuterol 90 mcg/actuation inhaler INHALE 2 PUFFS BY MOUTH AS DIRECTED EVERY 4 HOURS AS NEEDED FOR WHEEZING AND FOR SHORTNESS OF BREATH 23   Historical Provider, MD   apixaban (Eliquis) 5 mg tablet Take 1 tablet (5 mg) by mouth twice a day.  Patient not taking: Reported on 2025   Historical Provider, MD   biotin 1 mg capsule Take 1 tablet  by mouth once daily.    Historical Provider, MD Robbins Ellipta 100-25 mcg/dose inhaler Inhale 1 puff once daily.    Historical Provider, MD   celecoxib (CeleBREX) 100 mg capsule Take 1 capsule (100 mg) by mouth 2 times a day as needed.    Historical Provider, MD   cholecalciferol (Vitamin D-3) 25 MCG (1000 UT) tablet Take 1 tablet (1,000 Units) by mouth once daily.    Historical Provider, MD   citalopram (CeleXA) 40 mg tablet Take 1 tablet (40 mg) by mouth once daily.    Historical Provider, MD   cyanocobalamin (Vitamin B-12) 500 mcg tablet Take 1 tablet (500 mcg) by mouth once daily. 2/26/24   Historical Provider, MD   cyclobenzaprine (Flexeril) 10 mg tablet Take 1 tablet (10 mg) by mouth 2 times a day as needed. 9/23/20   Historical Provider, MD   docusate sodium (Colace) 100 mg capsule Take 1 capsule (100 mg) by mouth 2 times a day as needed.    Historical Provider, MD   dofetilide (Tikosyn) 250 mcg capsule Take 1 capsule (250 mcg) by mouth twice a day.  Patient not taking: Reported on 5/8/2025 5/17/23   Historical Provider, MD   FeroSuL tablet Take 1 tablet (325 mg) by mouth early in the morning.. 1/11/24   Historical Provider, MD   flecainide (Tambocor) 50 mg tablet Take 1 tablet (50 mg) by mouth once daily.    Historical Provider, MD   lidocaine 4 % patch Place 1 patch on the skin once daily. 7/19/24   Historical Provider, MD   magnesium oxide (Mag-Ox) 200 mg split tablet Take 2 half tablet (400 mg) by mouth once daily.    Historical Provider, MD   methocarbamol (Robaxin) 500 mg tablet Take 1 tablet (500 mg) by mouth if needed for muscle spasms. 3/13/24   Historical Provider, MD   multivitamin tablet Take 1 tablet by mouth once daily. 1/23/03   Historical Provider, MD   oxyCODONE-acetaminophen (Percocet) 5-325 mg tablet Take 1 tablet by mouth every 6 hours if needed for severe pain (7 - 10). Do not take tramadol while taking this medication. 3/17/25   Mike Barba, DO   pantoprazole (ProtoNix) 40 mg EC  tablet Take 1 tablet (40 mg) by mouth once daily in the morning. Take before meals. 8/26/20   Historical Provider, MD   potassium gluconate 595 mg (99 mg) tablet Take 1 tablet by mouth once daily.    Historical Provider, MD   pregabalin (Lyrica) 100 mg capsule Take 1 capsule (100 mg) by mouth once daily.  Patient taking differently: Take 1 capsule (100 mg) by mouth if needed. 3/16/23   Historical Provider, MD   spironolactone (Aldactone) 25 mg tablet Take 1 tablet (25 mg) by mouth once daily.  Patient not taking: Reported on 5/8/2025 8/18/23   Historical Provider, MD   traMADol (Ultram) 50 mg tablet Take 2 tablets (100 mg) by mouth every 8 hours if needed for moderate pain (4 - 6).  Patient not taking: Reported on 5/8/2025 8/9/22   Historical Provider, MD   traZODone (Desyrel) 50 mg tablet Take 1 tablet (50 mg) by mouth once daily at bedtime.    Historical Provider, MD SALINAS ROS:   Constitutional:    no fever   no chills   no unexpected weight change  Neuro/Psych:    no numbness   no weakness   no light-headedness   no tremors   no confusion   not nervous/anxious   no self-injury   no suicidal ideation  Eyes:    no discharge   no vision loss   no diplopia   no visual disturbance   use of corrective lenses  Ears:    no ear pain   no hearing loss   no vertigo   no tinnitus   no hearing aides  Nose:    no nasal discharge   no sinus congestion   no epistaxis  Mouth:    no dental issues   no mouth pain   no oral bleeding   no mouth lesions  Throat:    no throat pain   no dysphagia   no voice change  Neck:    no neck pain   neck swelling   no neck stiffness   no neck masses  Cardio:    no chest pain   no palpitations   no peripheral edema   no dyspnea   no NARANJO   no paroxysmal nocturnal dyspnea  Respiratory:    no cough   no wheezing   no hemoptysis   no shortness of breath  Endocrine:    no cold intolerance   no heat intolerance   no polydipsia  GI:    no abdominal distention   no abdominal pain   no constipation    no diarrhea   no nausea   no vomiting   no blood in stool  :    no difficulty urinating   no dysuria   no oliguria   no polyuria  Musculoskeletal:    no arthralgias   myalgias (back pain 8/10)   no decreased ROM   no swelling  Hematologic:    does not bruise/bleed easily   no excessive bleeding   no history of blood transfusion   no blood clots  Skin:   no skin changes   no sores/wound   no rash      Physical Exam  Vitals reviewed. Physical exam within normal limits.   Constitutional:       General: She is not in acute distress.     Appearance: Normal appearance. She is normal weight. She is not ill-appearing, toxic-appearing or diaphoretic.   HENT:      Head: Normocephalic.      Nose: Nose normal. No congestion or rhinorrhea.      Mouth/Throat:      Mouth: Mucous membranes are moist.      Pharynx: Oropharynx is clear. No oropharyngeal exudate or posterior oropharyngeal erythema.   Eyes:      General: No scleral icterus.        Right eye: No discharge.         Left eye: No discharge.      Conjunctiva/sclera: Conjunctivae normal.   Neck:      Vascular: No carotid bruit.   Cardiovascular:      Rate and Rhythm: Normal rate and regular rhythm.      Pulses: Normal pulses.      Heart sounds: Normal heart sounds. No murmur heard.     No friction rub. No gallop.   Pulmonary:      Effort: Pulmonary effort is normal. No respiratory distress.      Breath sounds: Normal breath sounds. No stridor. No wheezing, rhonchi or rales.   Chest:      Chest wall: No tenderness.   Musculoskeletal:         General: No swelling or tenderness. Normal range of motion.      Cervical back: Normal range of motion and neck supple. No rigidity or tenderness.   Skin:     General: Skin is warm and dry.   Neurological:      General: No focal deficit present.      Mental Status: She is alert.      Motor: No weakness.   Psychiatric:         Mood and Affect: Mood normal.         Behavior: Behavior normal.         Thought Content: Thought content  "normal.         Judgment: Judgment normal.          PAT AIRWAY:   Airway:     Mallampati::  II    TM distance::  >3 FB    Neck ROM::  Full   upper dentures and lower dentures        Visit Vitals  /72   Pulse 61   Temp 36.7 °C (98.1 °F) (Temporal)   Ht 1.638 m (5' 4.5\")   Wt 90.8 kg (200 lb 1.6 oz)   SpO2 96%   BMI 33.82 kg/m²   OB Status Postmenopausal   Smoking Status Never   BSA 2.03 m²       DASI Risk Score      Flowsheet Row Pre-Admission Testing from 6/17/2025 in [facility]   Can you take care of yourself (eat, dress, bathe, or use toilet)?  2.75 filed at 06/17/2025 1303   Can you walk indoors, such as around your house? 1.75 filed at 06/17/2025 1303   Can you walk a block or two on level ground?  2.75 filed at 06/17/2025 1303   Can you climb a flight of stairs or walk up a hill? 5.5 filed at 06/17/2025 1303   Can you run a short distance? 0 filed at 06/17/2025 1303   Can you do light work around the house like dusting or washing dishes? 2.7 filed at 06/17/2025 1303   Can you do moderate work around the house like vacuuming, sweeping floors or carrying groceries? 0 filed at 06/17/2025 1303   Can you do heavy work around the house like scrubbing floors or lifting and moving heavy furniture?  0 filed at 06/17/2025 1303   Can you do yard work like raking leaves, weeding or pushing a mower? 0 filed at 06/17/2025 1303   Can you have sexual relations? 5.25 filed at 06/17/2025 1303   Can you participate in moderate recreational activities like golf, bowling, dancing, doubles tennis or throwing a baseball or football? 0 filed at 06/17/2025 1303   Can you participate in strenous sports like swimming, singles tennis, football, basketball, or skiing? 0 filed at 06/17/2025 1303   DASI SCORE 20.7 filed at 06/17/2025 1303   METS Score (Will be calculated only when all the questions are answered) 5.3 filed at 06/17/2025 1303          Caprini DVT Assessment      Flowsheet Row Pre-Admission Testing from " 6/17/2025 in [facility] ED to Hosp-Admission (Discharged) from 12/10/2023 in [facility] 9 with Aubrey Ambrose MD   DVT Score (IF A SCORE IS NOT CALCULATING, MUST SELECT A BMI TO COMPLETE) 10 filed at 06/17/2025 1501 5 filed at 12/10/2023 2138   Surgical Factors Major surgery planned, lasting over 3 hours filed at 06/17/2025 1501 --   BMI (BMI MUST BE CHOSEN) 31-40 (Obesity) filed at 06/17/2025 1501 31-40 (Obesity) filed at 12/10/2023 2138   RETIRED: History -- --  [COVID] filed at 12/10/2023 2138   RETIRED: Age -- 60-75 years filed at 12/10/2023 2138          Modified Frailty Index      Flowsheet Row Pre-Admission Testing from 6/17/2025 in [facility]   Non-independent functional status (problems with dressing, bathing, personal grooming, or cooking) 0 filed at 06/17/2025 1502   History of diabetes mellitus  0 filed at 06/17/2025 1502   History of COPD 0 filed at 06/17/2025 1502   History of CHF 0.0909 filed at 06/17/2025 1502   History of MI 0 filed at 06/17/2025 1502   History of Percutaneous Coronary Intervention, Cardiac Surgery, or Angina No filed at 06/17/2025 1502   Hypertension requiring the use of medication  0.0909 filed at 06/17/2025 1502   Peripheral vascular disease 0 filed at 06/17/2025 1502   Impaired sensorium (cognitive impairement or loss, clouding, or delirium) 0 filed at 06/17/2025 1502   TIA or CVA withouy residual deficit 0 filed at 06/17/2025 1502   Cerebrovascular accident with deficit 0 filed at 06/17/2025 1502   Modified Frailty Index Calculator .1818 filed at 06/17/2025 1502          [id number] Stroke Risk Points  Current as of just now        4 0 to 9 Points:      No Change          The [id number] risk score (Lip CHAYO, et al. 2009. © 2010 [employer]) quantifies the risk of stroke for a patient with atrial fibrillation. For patients without atrial fibrillation or under the age of 18 this score appears as N/A.  Higher score values generally indicate higher risk of stroke.          Points Metrics   1 Has Congestive Heart Failure:  Yes      Patients with congestive heart failure get 1 point.    Current as of just now   1 Has Hypertension:  Yes      Patients with hypertension get 1 point.    Current as of just now   1 Age:  70     Patients 65 to 74 years old get 1 point, or patients 75 years and older get 2 points.    Current as of just now   0 Has Diabetes:  No     Patients with diabetes get 1 point.    Current as of just now   0 Had Stroke:  No  Had TIA:  No  Had Thromboembolism:  No     Patients who have had a stroke, TIA, or thromboembolism get 2 points.    Current as of just now   0 Has Vascular Disease:  No     Patients with vascular disease get 1 point.    Current as of just now   1 Clinically Relevant Sex:  Female     Patients with a clinically relevant sex of Female get 1 point.    Current as of just now             Revised Cardiac Risk Index      Flowsheet Row Pre-Admission Testing from 6/17/2025 in [facility]   High-Risk Surgery (Intraperitoneal, Intrathoracic,Suprainguinal vascular) 0 filed at 06/17/2025 1427   History of ischemic heart disease (History of MI, History of positive exercuse test, Current chest paint considered due to myocardial ischemia, Use of nitrate therapy, ECG with pathological Q Waves) 0 filed at 06/17/2025 1427   History of congestive heart failure (pulmonary edemia, bilateral rales or S3 gallop, Paroxysmal nocturnal dyspnea, CXR showing pulmonary vascular redistribution) 1 filed at 06/17/2025 1427   History of cerebrovascular disease (Prior TIA or stroke) 0 filed at 06/17/2025 1427   Pre-operative insulin treatment 0 filed at 06/17/2025 1427   Pre-operative creatinine>2 mg/dl 0 filed at 06/17/2025 1427   Revised Cardiac Risk Calculator 1 filed at 06/17/2025 1427          Apfel Simplified Score      Flowsheet Row Pre-Admission Testing from 6/17/2025 in [facility]   Smoking status 1 filed at 06/17/2025 1501   History of motion sickness or PONV  0 filed at 06/17/2025 1501   Use of postoperative opioids 1 filed at 06/17/2025 1501   Gender - Female 1=Yes filed at 06/17/2025 1501   Apfel Simplified Score Calculator 3 filed at 06/17/2025 1501          Risk Analysis Index Results This Encounter    No data found in the last 10 encounters.       Stop Bang Score      Flowsheet Row Pre-Admission Testing from 6/17/2025 in [facility]   Do you snore loudly? 1 filed at 06/17/2025 1304   Do you often feel tired or fatigued after your sleep? 0 filed at 06/17/2025 1304   Has anyone ever observed you stop breathing in your sleep? 0 filed at 06/17/2025 1304   Do you have or are you being treated for high blood pressure? 0 filed at 06/17/2025 1304   Recent BMI (Calculated) 35.6 filed at 06/17/2025 1304   Is BMI greater than 35 kg/m2? 1=Yes filed at 06/17/2025 1304   Age older than 50 years old? 1=Yes filed at 06/17/2025 1304   Is your neck circumference greater than 17 inches (Male) or 16 inches (Female)? 0 filed at 06/17/2025 1304   Gender - Male 0=No filed at 06/17/2025 1304   STOP-BANG Total Score 3 filed at 06/17/2025 1304          Prodigy: High Risk  Total Score: 15              Prodigy Age Score      Prodigy Previous Opioid Use Score           ARISCAT Score for Postoperative Pulmonary Complications      Flowsheet Row Pre-Admission Testing from 6/17/2025 in [facility]   Age Calculated Score 3 filed at 06/17/2025 1501   Preoperative SpO2 0 filed at 06/17/2025 1501   Respiratory infection in the last month Either upper or lower (i.e., URI, bronchitis, pneumonia), with fever and antibiotic treatment 0 filed at 06/17/2025 1501   Preoperative anemia (Hgb less than 10 g/dl) 0 filed at 06/17/2025 1501   Surgical incision  0 filed at 06/17/2025 1501   Duration of surgery  23 filed at 06/17/2025 1501   Emergency Procedure  0 filed at 06/17/2025 1501   DEDE  "Total Score  26 filed at 2025 1501          [place] Perioperative Risk for Myocardial Infarction or Cardiac Arrest (NIKKI)    No data to display           Assessment and Plan:   Anesthesia/Airway:  No anesthesia complications        Neuro:    #Depression, -medicated with Celexa (continue), trazodone (continue).  No further preoperative intervention.     Patient is at an increased risk for post operative delirium secondary to age >/= 65, depression, and type and duration of surgery.  Preoperative brain exercise educational handout provided to patient.    The patient is at an increased risk for perioperative stroke secondary to increased age, HLD, female sex , general anesthesia, and op time >2.5 hours.       HEENT/Airway:    No HEENT diagnosis or significant findings on chart review or clinical presentation and evaluation. No further preoperative testing/intervention indicated at this time.      Cardiovascular:    #HLD, Artial Fibrillation, orthostatic hypotension, vasovagal syncope, CHF,- medicated with potassium, acebutolol (continue both) and follows with cardiology. BP today is 109/72 and denies cardiovascular symptoms. Physical exam is benign.  Patient reports they have not had a syncopal episode in a couple years. METS is >4 and scheduled for non-cardiac surgery.  \"Low Risk\" per Cardiology (scanned to media), no additional preoperative testing is currently indicated.    METS are 5.3    RCRI  1 which is 6% 30 day risk of MACE (risk for cardiac death, nonfatal myocardial infarction, and nonfactal cardiac arrest    NIKKI score which indicates a   0.2 % risk of intraoperative or 30-day postoperative MACE     EK25  Normal sinus rhythm  Left axis deviation  Abnormal ECG        Echo: 23 at [facility]  CONCLUSIONS:   - Exam indication: pericardial effusion   - The left ventricle is normal in size. Left ventricular systolic function is   normal. EF = 67 ± 5% (2D biplane)   - Exam was compared with the prior CC " echocardiographic exam performed on 11-17-22  with no significant change . No pericardial effus         Stress Test: 07/01/19 at [facility] See media  1. SPECT Perfusion Study: Normal.    2. There is no scintigraphic evidence for inducible ischemia.    3. No evidence of scarred myocardium.    4. Functional capacity N/A (pharmacological).    5. Left ventricle is normal in size. The left ventricle systolic function is normal.    6. Right ventricle is normal in size. The right ventricle systolic function is normal.    7. This is a low risk scan.  Gated Stress FBP  LVEF % 68         Cardiac Cath: 07/18/24 at [facility]  LEFT MAIN:  This is a large caliber, medium length, normal vessel.   LEFT ANTERIOR DESCENDING:  This is a large caliber, normal vessel advancing to the  apex.   CIRCUMFLEX:  This is a large caliber, normal vessel.   RIGHT CORONARY ARTERY:  This is a large caliber, dominant normal vessel.   SUMMARY: Normal coronary arteries.         Pulmonary:    # Asthma-patient reports her asthma is well-controlled and they do not remember the last time they used albuterol rescue inhaler.  They have never been hospitalized for asthma or had an exacerbation.  Patient denies any respiratory symptoms and physical exam is benign.  Preoperative deep breathing educational handout provided to patient.    No pulmonary diagnosis, however patient is at increased risk of perioperative complications secondary to  age > 60, obesity, heart failure, duration of surgery > 2 hours, types of anesthetic    ARISCAT:    26  points which is a intermediate (13.3%) risk of in-hospital post-op pulmonary complications     PRODIGY:  19  points which is a high risk of post op opioid induced respiratory depression episodes    STOP BANG:  3   points which is a intermediate risk for moderate to severe ALICIA    Pumonary toilet education discussed, patient also provided deep breathing exercises and incentive spirometry educational handout      Renal:   #CKD3 -patient  reports they were told they had kidney dysfunction approximately 2 years ago during their ablation however they said that they were told this recovered.  They are unaware they were diagnosed with chronic kidney disease.  Per lab results creatinine is within normal limits and has been for the past 3 years     Patient is at increase risk for perioperative renal complications secondary to  Age equal to or greater than 56, BMI equal to or greater than 30, HTN, use of an ace, arb, or NSAID       Endocrine:   No endocrine diagnosis or significant findings on chart review or clinical presentation and evaluation. No further testing or intervention is indicated at this time.      Hematologic/Oncology:      #Iron deficiency anemia, B12 deficiency, -repeat lab work today, last H/H was within normal limits.  Please hold supplementation on day of surgery.    Patient confirmed they would accept blood products if necessary.   Preoperative DVT educational handout provided to patient.  Caprini Score:  10  points which is a highest risk of perioperative VTE      Gastrointestinal:   #GERD, s/p gastric sleeve 2005, bowel incontinence -patient is medicated with pantoprazole (continue), Colace (holding surgery).  Patient denies any GI/ symptoms at this time.    EAT-10 score of    0  : self-perceived oropharyngeal dysphagia scale (0-40)     Apfel: 3 points 61% risk for post operative N/V      Infectious disease:     No infectious diagnosis or significant findings on chart review or clinical presentation and evaluation.   Prescription provided for CHG body wash and dental rinse. CHG use instructions reviewed and provided to patient.  Staph screen collected      Musculoskeletal:    #Chronic bilateral low back pain with bilateral sciatica, DDD, OA,  -seeking surgical intervention with neurosurgery.  Patient currently has a spinal stimulator and was directed to bring remote control on day of surgery fully charged.  Patient reports  significant pain in her back without change.      Other:     Hold Vit D supplementation day of surgery  Hold all other vitamins and supplements 7 days prior to surgery  Tylenol okay to continue, please hold Aleve/naproxen/ibuprofen/Excedrin (NSAIDs) for 7 days prior to surgery  No lotion/moisturizers or Deoderant after last shower prior to surgery        Labs ordered  Recent Results (from the past 3 weeks)   Basic Metabolic Panel    Collection Time: 06/17/25  1:20 PM   Result Value Ref Range    Glucose 95 74 - 99 mg/dL    Sodium 140 136 - 145 mmol/L    Potassium 5.0 3.5 - 5.3 mmol/L    Chloride 103 98 - 107 mmol/L    Bicarbonate 27 21 - 32 mmol/L    Anion Gap 15 10 - 20 mmol/L    Urea Nitrogen 16 6 - 23 mg/dL    Creatinine 1.14 (H) 0.50 - 1.05 mg/dL    eGFR 51 (L) >60 mL/min/1.73m*2    Calcium 9.7 8.6 - 10.6 mg/dL   Prealbumin    Collection Time: 06/17/25  1:20 PM   Result Value Ref Range    Prealbumin 28.7 18.0 - 40.0 mg/dL   CBC and Auto Differential    Collection Time: 06/17/25  1:20 PM   Result Value Ref Range    WBC 6.4 4.4 - 11.3 x10*3/uL    nRBC 0.0 0.0 - 0.0 /100 WBCs    RBC 4.78 4.00 - 5.20 x10*6/uL    Hemoglobin 13.7 12.0 - 16.0 g/dL    Hematocrit 43.3 36.0 - 46.0 %    MCV 91 80 - 100 fL    MCH 28.7 26.0 - 34.0 pg    MCHC 31.6 (L) 32.0 - 36.0 g/dL    RDW 13.1 11.5 - 14.5 %    Platelets 265 150 - 450 x10*3/uL    Neutrophils % 61.1 40.0 - 80.0 %    Immature Granulocytes %, Automated 0.2 0.0 - 0.9 %    Lymphocytes % 29.4 13.0 - 44.0 %    Monocytes % 5.7 2.0 - 10.0 %    Eosinophils % 3.1 0.0 - 6.0 %    Basophils % 0.5 0.0 - 2.0 %    Neutrophils Absolute 3.88 1.60 - 5.50 x10*3/uL    Immature Granulocytes Absolute, Automated 0.01 0.00 - 0.50 x10*3/uL    Lymphocytes Absolute 1.87 0.80 - 3.00 x10*3/uL    Monocytes Absolute 0.36 0.05 - 0.80 x10*3/uL    Eosinophils Absolute 0.20 0.00 - 0.40 x10*3/uL    Basophils Absolute 0.03 0.00 - 0.10 x10*3/uL   Hemoglobin A1C    Collection Time: 06/17/25  1:20 PM   Result Value  Ref Range    Hemoglobin A1C 5.2 See comment %    Estimated Average Glucose 103 Not Established mg/dL   Type And Screen Is this order related to pregnancy or an upcoming surgery? Yes; Where will this surgery/delivery be [facility]? [facility]; What is the date of the surgery? 7/1/2025; Has this patient ever had a transfusion? No; Has th...    Collection Time: 06/17/25  1:20 PM   Result Value Ref Range    ABO TYPE A     Rh TYPE POS     ANTIBODY SCREEN NEG    Coagulation Screen    Collection Time: 06/18/25 11:29 AM   Result Value Ref Range    Protime 11.9 9.8 - 12.4 seconds    INR 1.1 0.9 - 1.1    aPTT 33 26 - 36 seconds           Christiane Pedraza PA-C     Medication instructions and NPO guidelines reviewed with the patient.  All questions or concerns discussed and addressed.   The above clinical summary has been dictated with voice recognition software. It has not been proofread for grammatical errors, typographical mistakes, or other semantic inconsistencies.   All labs/imaging included on this documentation were reviewed/considered in medical decision making for perioperative planning, including labs ordered day of CPM appointment.           [1]   Past Medical History:  Diagnosis Date    Abnormal EKG     2/16/25--Normal sinus rhythm Left axis deviation Abnormal ECG    Anemia     Arrhythmia     A-Fib on Eliquis    Arthritis     Asthma     Chronic bilateral low back pain with bilateral sciatica     seen by Dr. Mike Quigley 05/08/25    CKD (chronic kidney disease)     reports PCP was watching in the past, not in 2025    GERD (gastroesophageal reflux disease)     controlled    HL (hearing loss)     Personal history of other diseases of the musculoskeletal system and connective tissue 08/13/2021    History of degenerative disc disease    Personal history of other diseases of the respiratory system 08/13/2021    History of bronchitis    Personal history of other endocrine, nutritional and metabolic disease      History of hypercholesterolemia   [2]   Past Surgical History:  Procedure Laterality Date    APPENDECTOMY  08/24/2017    Appendectomy    CERVICAL FUSION  08/24/2017    Cervical Vertebral Fusion    CHOLECYSTECTOMY  08/24/2017    Cholecystectomy    COLON SURGERY  08/13/2021    Colon Surgery    COLONOSCOPY      LUMBAR LAMINECTOMY  08/24/2017    Laminectomy Lumbar    OTHER SURGICAL HISTORY  08/24/2017    Spinal Stereotaxis Stimulation Of Cord    STOMACH SURGERY  2005    gastric sleeve   [3]   Family History  Problem Relation Name Age of Onset    No Known Problems Mother      No Known Problems Father      Atrial fibrillation Brother     [4]   Allergies  Allergen Reactions    Nickel Swelling, Rash and Blurred vision    Colchicine Other    Fluticasone Propion-Salmeterol Angioedema and Unknown    Erythromycin Rash     Periorbital dermatitis - worsening sx    Hydroxychloroquine Diarrhea and Hives    Morphine GI Upset and Nausea/vomiting    Sulfa (Sulfonamide Antibiotics) Rash and Unknown

## 2025-06-17 NOTE — PREPROCEDURE INSTRUCTIONS
Thank you for visiting [facility] ([facility]) today for your pre-procedure evaluation, you were seen by     Christiane Pedraza PA-C   Department of Anesthesiology and Perioperative Medicine  Main phone [phone]  Fax [phone]    This summary includes instructions and information to aid you during your perioperative period.  Please read carefully. If you have any questions about your visit today, please call the number listed above.  If you become ill or have any changes to your health before your surgery, please contact your primary care provider and alert your surgeon.    -Bring spinal stimulator remote to day of surgery, fully charged.     General Medications Instructions (see back for further medication instructions)  Hold Vit D supplementation day of surgery  Hold all other vitamins and supplements 7 days prior to surgery  Tylenol okay to continue, please hold Aleve/naproxen/ibuprofen (NSAIDs) for 7 days prior to surgery  No lotion/moisturizers or Deoderant after last shower prior to surgery    You will be called business day prior to surgery to confirm arrival time.     Preparing for Surgery       Preoperative Fasting Guidelines  Why must I stop eating and drinking near surgery time?  With sedation, food or liquid in your stomach can enter your lungs causing serious complications  Food can increase nausea and vomiting  When do I need to stop eating and drinking before my surgery?  Do not eat any food after midnight the night before your surgery/procedure.  You may have up to 13.5 ounces of clear liquid until TWO hours before your instructed arrival time to the hospital.  This includes water, black tea/coffee, (no milk or cream) apple juice, and electrolyte drinks (Gatorade)  You may chew gum until TWO hours before your surgery/procedure    Tobacco and Alcohol;  Do not drink alcohol or smoke within 24 hours of surgery.  It is best to quit smoking for as long as possible before any  "surgery or procedure.       Other Instructions  Why did I have my nose, under my arms, and groin swabbed? The purpose of the swab is to identify Staphylococcus aureus inside your nose or on your skin.  The swab was sent to the laboratory for culture.  A positive swab/culture for Staphylococcus aureus is called colonization or carriage.   What is Staphylococcus aureus? Staphylococcus aureus, also known as \"staph\", is a germ found on the skin or in the nose of healthy people.  Sometimes Staphylococcus aureus can get into the body and cause an infection.  This can be minor (such as pimples, boils, or other skin problems).  It might also be serious (such as a blood infection, pneumonia, or a surgical site infection). What is Staphylococcus aureus colonization or carriage? Colonization or carriage means that a person has the germ but is not sick from it.  These bacteria can be spread on the hands or when breathing or sneezing. How is Staphylococcus aureus spread? It is most often spread by close contact with a person or item that carries it. What happens if my culture is positive for Staphylococcus aureus? Your doctor/medical team will use this information to guide any antibiotic treatment which may be necessary.  Regardless of the culture results, we will clean the inside of your nose with a betadine swab just before you have your surgery. Will I get an infection if I have Staphylococcus aureus in my nose or on my skin? Anyone can get an infection with Staphylococcus aureus.  However, the best way to reduce your risk of infection is to follow the instructions provided to you for the use of your CHG soap and dental rinse.  , Body Wash; What is a home preoperative antibacterial shower? This shower is a way of cleaning the skin with a germ-killing solution before surgery.  The solution contains chlorhexidine, commonly known as CHG.  CHG is a skin cleanser with germ-killing ability.  Let your doctor know if you are allergic " to chlorhexidine. Why do I need to take a preoperative antibacterial shower? Skin is not sterile.  It is best to try to make your skin as free of germs as possible before surgery.  Proper cleansing with a germ-killing soap before surgery can lower the number of germs on your skin.  This helps to reduce the risk of infection at the surgical site.  Following the instructions listed below will help you prepare your skin for surgery.   How do I use the solution? Steps:  Begin using your CHG soap 5 days before your scheduled surgery on ___________.   First, wash and rinse your hair using the CHG soap. Keep CHG soap away from ear canals and eyes.  Rinse completely, do not condition.  Hair extensions should be removed. , Oral/Dental Rinse: What is oral/dental rinse?  It is a mouthwash. It is a way of cleaning the mouth with a germ-killing solution before your surgery.  The solution contains chlorhexidine, commonly known as CHG. It is used inside the mouth to kill a bacteria known as Staphylococcus aureus.  Let your doctor know if you are allergic to Chlorhexidine. Why do I need to use CHG oral/dental rinse? The CHG oral/dental rinse helps to kill a bacteria in your mouth known as Staphylococcus aureus.  This reduces the risk of infection at the surgical site.  Using your CHG oral/dental rinse STEPS: Use your CHG oral/dental rinse after you brush your teeth the night before (at bedtime) and the morning of your surgery.  Follow all directions on your prescription label.  Use the cap on the container to measure 15 ml.  Swish (gargle if you can) the mouthwash in your mouth for at least 30 seconds, (do not swallow) and spit out.  After you use your CHG rinse, do not rinse your mouth with water, drink or eat.  Please refer to the prescription label for the appropriate time to resume oral intake What side effects might I have using the CHG oral/dental rinse? CHG rinse will stick to plaque on the teeth.  Brush and floss just  before use.  Teeth brushing will help avoid staining of plaque during use.       The Week before Surgery        Seven days before Surgery  Check your CPM medication instructions  Do the exercises provided to you by CPM   Arrange for a responsible, adult licensed  to take you home after surgery and stay with you for 24 hours.  You will not be permitted to drive yourself home if you have received any anesthetic/sedation  Five days before surgery  Check your CPM medication instructions  Do the exercises provided to you by CPM   Start using Chlorhexidene (CHG) body wash if prescribed (Continue till day of surgery)      The Day before Surgery       Check your CPM medication and all other CPM instructions including when to stop eating and drinking  You will be called with your arrival time for surgery in the late afternoon.  If you do not receive a call please reach out to your surgeon's office.  Do not smoke or drink 24 hours before surgery  Prepare items to bring with you to the hospital  Shower with your chlorhexidine wash if prescribed  Brush your teeth and use your chlorhexidine dental rinse if prescribed    The Day of Surgery       Check your CPM medication instructions  Shower, if prescribed use CHG.  Do not apply any lotions, creams, moisturizers, perfume or deodorant  Brush your teeth and use your CHG dental rinse if prescribed  Wear loose comfortable clothing  Avoid make-up  Remove  jewelry and piercings, consider professional piercing removal with a plastic spacer if needed  Bring photo ID and Insurance card  Bring an accurate medication list that includes medication dose, frequency and allergies  Bring a copy of your advanced directives (will, health care power of )  Bring any devices and controllers as well as medical devices you have been provided with for surgery (CPAP, slings, braces, etc.)  Dentures, eyeglasses, and contacts will be removed before surgery, please bring cases for contacts or  glasses    Preoperative Deep Breathing Exercises    Why it is important to do deep breathing exercises before my surgery?  Deep breathing exercises strengthen your breathing muscles.  This helps you to recover after your surgery and decreases the chance of breathing complications.    How are the deep breathing exercises done?  Sit straight with your back supported.  Breathe in deeply and slowly through your nose. Your lower rib cage should expand and your abdomen may move forward.  Hold that breath for 3 to 5 seconds.  Breathe out through pursed lips, slowly and completely.  Rest and repeat 10 times every hour while awake.  Rest longer if you become dizzy or lightheaded.      Preoperative Brain Exercises    What are brain exercises?  A brain exercise is any activity that engages your thinking (cognitive) skills.    What types of activities are considered brain exercises?  Jigsaw puzzles, crossword puzzles, word jumble, memory games, word search, and many more.  Many can be found free online or on your phone via a mobile rochelle.    Why should I do brain exercises before my surgery?  More recent research has shown brain exercise before surgery can lower the risk of postoperative delirium (confusion) which can be especially important for older adults.  Patients who did brain exercises for 5 to 10 hours the days before surgery, cut their risk of postoperative delirium in half up to 1 week after surgery.    Sit-to-Stand Exercise    What is the sit-to-stand exercise?  The sit-to-stand exercise strengthens the muscles of your lower body and muscles in the center of your body (core muscles for stability) helping to maintain and improve your strength and mobility.  How do I do the sit-to-stand exercise?  The goal is to do this exercise without using your arms or hands.  If this is too difficult, use your arms and hands or a chair with armrests to help slowly push yourself to the standing position and lower yourself back to the  sitting position. As the movement becomes easier use your arms and hands less.    Steps to the sit-to-stand exercise  Sit up tall in a sturdy chair, knees bent, feet flat on the floor shoulder-width apart.  Shift your hips/pelvis forward in the chair to correctly position yourself for the next movement.  Lean forward at your hips.  Stand up straight putting equal weight on both feet.  Check to be sure you are properly aligned with the chair, in a slow controlled movement sit back down.  Repeat this exercise 10-15 times.  If needed you can do it fewer times until your strength improves.  Rest for 1 minute.  Do another 10-15 sit-to-stand exercises.  Try to do this in the morning and evening.       Simple things you can do to help prevent blood clots     Blood clots are blockages that can form in the body's veins. When a blood clot forms in your deep veins, it may be called a deep vein thrombosis, or DVT for short. Blood clots can happen in any part of the body where blood flows, but they are most common in the arms and legs. If a piece of a blood clot breaks free and travels to the lungs, it is called a pulmonary embolus (PE). A PE can be a very serious problem.      Being in the hospital or having surgery can raise your chances of getting a blood clot because you may not be well enough to move around as much as you normally do.         Ways you can help prevent blood clots in the hospital       Wearing SCDs  SCDs stands for Sequential Compression Devices.   SCDs are special sleeves that wrap around your legs. They attach to a pump that fills them with air to gently squeeze your legs every few minutes.  This helps return the blood in your legs to your heart.   SCDs should only be taken off when walking or bathing. SCDs may not be comfortable, but they can help save your life.              Pump SCD leg sleeves  Wearing compression stockings - if your doctor orders them. These special snug-fitting stockings gently  squeeze your legs to help blood flow.       Walking. Walking helps move the blood in your legs.   If your doctor says it is ok, try walking the halls at least   5 times a day. Ask us to help you get up, so you don't fall.      Taking any blood-thinning medicines your doctor orders.              Ways you can help prevent blood clots at home         Wearing compression stockings - if your doctor orders them.   Walking - to help move the blood in your legs.    Taking any blood-thinning medicines your doctor orders.      Signs of a blood clot or PE    Tell your doctor or nurse right away if you have any of the problems listed below.         If you are at home, seek medical care right away. Call 911 for chest pain or problems breathing.            Signs of a blood clot (DVT) - such as pain, swelling, redness, or warmth in your arm or legs.  Signs of a pulmonary embolism (PE) - such as chest pain or feeling short of breath

## 2025-06-18 ENCOUNTER — LAB (OUTPATIENT)
Dept: LAB | Facility: HOSPITAL | Age: 70
End: 2025-06-18
Payer: MEDICARE

## 2025-06-18 LAB
APTT PPP: 33 SECONDS (ref 26–36)
INR PPP: 1.1 (ref 0.9–1.1)
PROTHROMBIN TIME: 11.9 SECONDS (ref 9.8–12.4)

## 2025-06-18 PROCEDURE — 85730 THROMBOPLASTIN TIME PARTIAL: CPT

## 2025-06-18 PROCEDURE — 85610 PROTHROMBIN TIME: CPT

## 2025-06-19 LAB — STAPHYLOCOCCUS SPEC CULT: NORMAL

## 2025-06-30 ENCOUNTER — ANESTHESIA EVENT (OUTPATIENT)
Dept: OPERATING ROOM | Facility: HOSPITAL | Age: 70
End: 2025-06-30
Payer: MEDICARE

## 2025-06-30 NOTE — PROGRESS NOTES
Pharmacy Medication History Review    Lanei Pablo is a 70 y.o. female who is planned to be admitted for Chronic bilateral low back pain with bilateral sciatica. Pharmacy called the patient prior to their scheduled procedure and reviewed the patient's [id number] medications for accuracy.    Medications ADDED:  none  Medications CHANGED:  Flecainide 50mg directions from #1QD to #1BID  Breo 100-25mcg directions from #1QD to #1PRN  Pregabalin 100mg note on 06/17/25 stated #1BIDprn changed back to #1QD  Trazodone 50mg directions from #1HS to #1HSprn  Medications REMOVED:   Acebutolol 200mg  Albuterol HFA  Albuterol 0.083%  Celebrex 100mg  Lidocaine 4% patch  Tramadol 50mg    Please review updated prior to admission medication list and comments regarding how patient may be taking medications differently by going to Admission tab --> Admission Orders --> Admit Orders / Review prior to admission medications.     Preferred pharmacy, last doses of medications, and allergies to be confirmed with patient by nursing the day of procedure.     Sources used to complete the med history include:  [facility]  Pharmacy dispense history  Patient Interview Good historian  Chart Review  Care Everywhere     Below are additional concerns with the patient's PTA list.  Patient states they are taking #1 tablet of flecainide 50mg twice daily. L.F. 03/04/25 #180/90d  Patient states they use their breo inhaler as needed. Does not use daily. L.F. 09/18/2024 #1  Patient states they are taking #1 capsule of pregabalin 100mg once daily. L.F. 03/13/25 #56/28d and OAS verified  Patient states they are taking #1 tablet of trazodone 50mg as needed at bedtime. Does not take daily. L.F. 12/06/24 #90/90d  Patient states they stopped supplements in preparation of procedure    Lisa Mesa [facility]  Please reach out via Secure Chat for questions

## 2025-06-30 NOTE — PROGRESS NOTES
Pharmacy Medication History Review    Lanie Pablo is a 70 y.o. female who is planned to be admitted for Chronic bilateral low back pain with bilateral sciatica. Pharmacy called the patient prior to their scheduled procedure and reviewed the patient's [id number] medications for accuracy.    Medications ADDED:  none  Medications CHANGED:  Flecainide 50mg directions from #1QD to #1BID  Breo 100-25mcg directions from #1QD to #1PRN  Pregabalin 100mg note on 06/17/25 stated #1BIDprn changed back to #1QD and pregabalin 50mg  Trazodone 50mg directions from #1HS to #1HSprn  Medications REMOVED:   Acebutolol 200mg  Albuterol HFA  Albuterol 0.083%  Celebrex 100mg  Lidocaine 4% patch  Tramadol 50mg    Please review updated prior to admission medication list and comments regarding how patient may be taking medications differently by going to Admission tab --> Admission Orders --> Admit Orders / Review prior to admission medications.     Preferred pharmacy, last doses of medications, and allergies to be confirmed with patient by nursing the day of procedure.     Sources used to complete the med history include:  [facility]  Pharmacy dispense history  Patient Interview Good historian  Chart Review  Care Everywhere     Below are additional concerns with the patient's PTA list.  Patient states they are taking #1 tablet of flecainide 50mg twice daily. L.F. 03/04/25 #180/90d  Patient states they use their breo inhaler as needed. Does not use daily. L.F. 09/18/2024 #1  Patient states they are taking #1 capsule of pregabalin 50mg once daily. L.F. 03/13/25 #56/28d and OAS verified  Patient states they are taking #1 tablet of trazodone 50mg as needed at bedtime. Does not take daily. L.F. 12/06/24 #90/90d  Patient states they stopped supplements in preparation of procedure    Lisa Mesa [facility]  Please reach out via Secure Chat for questions

## 2025-07-01 ENCOUNTER — HOSPITAL ENCOUNTER (INPATIENT)
Facility: HOSPITAL | Age: 70
End: 2025-07-01
Attending: STUDENT IN AN ORGANIZED HEALTH CARE EDUCATION/TRAINING PROGRAM | Admitting: STUDENT IN AN ORGANIZED HEALTH CARE EDUCATION/TRAINING PROGRAM
Payer: MEDICARE

## 2025-07-01 ENCOUNTER — APPOINTMENT (OUTPATIENT)
Dept: RADIOLOGY | Facility: HOSPITAL | Age: 70
DRG: 448 | End: 2025-07-01
Payer: MEDICARE

## 2025-07-01 ENCOUNTER — ANESTHESIA (OUTPATIENT)
Dept: OPERATING ROOM | Facility: HOSPITAL | Age: 70
End: 2025-07-01
Payer: MEDICARE

## 2025-07-01 DIAGNOSIS — M54.41 CHRONIC BILATERAL LOW BACK PAIN WITH BILATERAL SCIATICA: Primary | ICD-10-CM

## 2025-07-01 DIAGNOSIS — M54.42 CHRONIC BILATERAL LOW BACK PAIN WITH BILATERAL SCIATICA: Primary | ICD-10-CM

## 2025-07-01 DIAGNOSIS — G89.29 CHRONIC BILATERAL LOW BACK PAIN WITH BILATERAL SCIATICA: Primary | ICD-10-CM

## 2025-07-01 LAB
ANION GAP BLDA CALCULATED.4IONS-SCNC: 10 MMO/L (ref 10–25)
ANION GAP BLDA CALCULATED.4IONS-SCNC: 11 MMO/L (ref 10–25)
ANION GAP BLDA CALCULATED.4IONS-SCNC: 13 MMO/L (ref 10–25)
ANION GAP BLDA CALCULATED.4IONS-SCNC: 9 MMO/L (ref 10–25)
BASE EXCESS BLDA CALC-SCNC: -2.3 MMOL/L (ref -2–3)
BASE EXCESS BLDA CALC-SCNC: -3.3 MMOL/L (ref -2–3)
BASE EXCESS BLDA CALC-SCNC: -3.7 MMOL/L (ref -2–3)
BASE EXCESS BLDA CALC-SCNC: -4.7 MMOL/L (ref -2–3)
BODY TEMPERATURE: 37 DEGREES CELSIUS
CA-I BLDA-SCNC: 1.12 MMOL/L (ref 1.1–1.33)
CA-I BLDA-SCNC: 1.14 MMOL/L (ref 1.1–1.33)
CA-I BLDA-SCNC: 1.15 MMOL/L (ref 1.1–1.33)
CA-I BLDA-SCNC: 1.17 MMOL/L (ref 1.1–1.33)
CHLORIDE BLDA-SCNC: 105 MMOL/L (ref 98–107)
CHLORIDE BLDA-SCNC: 107 MMOL/L (ref 98–107)
CHLORIDE BLDA-SCNC: 107 MMOL/L (ref 98–107)
CHLORIDE BLDA-SCNC: 108 MMOL/L (ref 98–107)
COHGB MFR BLDA: 0.9 %
DO-HGB MFR BLDA: 0 % (ref 0–5)
GLUCOSE BLDA-MCNC: 108 MG/DL (ref 74–99)
GLUCOSE BLDA-MCNC: 118 MG/DL (ref 74–99)
GLUCOSE BLDA-MCNC: 122 MG/DL (ref 74–99)
GLUCOSE BLDA-MCNC: 143 MG/DL (ref 74–99)
HCO3 BLDA-SCNC: 22.1 MMOL/L (ref 22–26)
HCO3 BLDA-SCNC: 22.1 MMOL/L (ref 22–26)
HCO3 BLDA-SCNC: 22.4 MMOL/L (ref 22–26)
HCO3 BLDA-SCNC: 22.5 MMOL/L (ref 22–26)
HCT VFR BLD EST: 31 % (ref 36–46)
HCT VFR BLD EST: 35 % (ref 36–46)
HCT VFR BLD EST: 38 % (ref 36–46)
HCT VFR BLD EST: 43 % (ref 36–46)
HGB BLDA-MCNC: 10.3 G/DL (ref 12–16)
HGB BLDA-MCNC: 11.8 G/DL (ref 12–16)
HGB BLDA-MCNC: 12.8 G/DL (ref 12–16)
HGB BLDA-MCNC: 14.2 G/DL (ref 12–16)
HGB BLDA-MCNC: 14.2 G/DL (ref 12–16)
INHALED O2 CONCENTRATION: 35 %
INHALED O2 CONCENTRATION: 37 %
INHALED O2 CONCENTRATION: 90 %
LACTATE BLDA-SCNC: 0.8 MMOL/L (ref 0.4–2)
LACTATE BLDA-SCNC: 0.9 MMOL/L (ref 0.4–2)
LACTATE BLDA-SCNC: 0.9 MMOL/L (ref 0.4–2)
LACTATE BLDA-SCNC: 1 MMOL/L (ref 0.4–2)
METHGB MFR BLDA: 1.1 % (ref 0–1.5)
OXYHGB MFR BLDA: 97.3 % (ref 94–98)
OXYHGB MFR BLDA: 97.8 % (ref 94–98)
OXYHGB MFR BLDA: 97.8 % (ref 94–98)
OXYHGB MFR BLDA: 98 % (ref 94–98)
OXYHGB MFR BLDA: 98 % (ref 94–98)
PCO2 BLDA: 38 MM HG (ref 38–42)
PCO2 BLDA: 40 MM HG (ref 38–42)
PCO2 BLDA: 42 MM HG (ref 38–42)
PCO2 BLDA: 50 MM HG (ref 38–42)
PH BLDA: 7.26 PH (ref 7.38–7.42)
PH BLDA: 7.33 PH (ref 7.38–7.42)
PH BLDA: 7.35 PH (ref 7.38–7.42)
PH BLDA: 7.38 PH (ref 7.38–7.42)
PO2 BLDA: 159 MM HG (ref 85–95)
PO2 BLDA: 188 MM HG (ref 85–95)
PO2 BLDA: 219 MM HG (ref 85–95)
PO2 BLDA: 377 MM HG (ref 85–95)
POTASSIUM BLDA-SCNC: 3.8 MMOL/L (ref 3.5–5.3)
POTASSIUM BLDA-SCNC: 3.9 MMOL/L (ref 3.5–5.3)
POTASSIUM BLDA-SCNC: 4.2 MMOL/L (ref 3.5–5.3)
POTASSIUM BLDA-SCNC: 4.3 MMOL/L (ref 3.5–5.3)
SAO2 % BLDA: 100 % (ref 94–100)
SAO2 % BLDA: 99 % (ref 94–100)
SODIUM BLDA-SCNC: 134 MMOL/L (ref 136–145)
SODIUM BLDA-SCNC: 135 MMOL/L (ref 136–145)
SODIUM BLDA-SCNC: 136 MMOL/L (ref 136–145)
SODIUM BLDA-SCNC: 137 MMOL/L (ref 136–145)

## 2025-07-01 PROCEDURE — 2500000002 HC RX 250 W HCPCS SELF ADMINISTERED DRUGS (ALT 637 FOR MEDICARE OP, ALT 636 FOR OP/ED): Performed by: ANESTHESIOLOGIST ASSISTANT

## 2025-07-01 PROCEDURE — 2500000004 HC RX 250 GENERAL PHARMACY W/ HCPCS (ALT 636 FOR OP/ED): Performed by: ANESTHESIOLOGIST ASSISTANT

## 2025-07-01 PROCEDURE — 36620 INSERTION CATHETER ARTERY: CPT | Performed by: ANESTHESIOLOGY

## 2025-07-01 PROCEDURE — XRGE058 FUSION OF RIGHT SACROILIAC JOINT USING INTERNAL FIXATION DEVICE WITH TULIP CONNECTOR, OPEN APPROACH, NEW TECHNOLOGY GROUP 8: ICD-10-PCS | Performed by: STUDENT IN AN ORGANIZED HEALTH CARE EDUCATION/TRAINING PROGRAM

## 2025-07-01 PROCEDURE — 22612 ARTHRD PST TQ 1NTRSPC LUMBAR: CPT | Performed by: STUDENT IN AN ORGANIZED HEALTH CARE EDUCATION/TRAINING PROGRAM

## 2025-07-01 PROCEDURE — 99100 ANES PT EXTEME AGE<1 YR&>70: CPT | Performed by: ANESTHESIOLOGY

## 2025-07-01 PROCEDURE — 27280 ARTHR SI JT OPN B1GRF INSTRM: CPT | Performed by: STUDENT IN AN ORGANIZED HEALTH CARE EDUCATION/TRAINING PROGRAM

## 2025-07-01 PROCEDURE — 2500000005 HC RX 250 GENERAL PHARMACY W/O HCPCS: Performed by: ANESTHESIOLOGIST ASSISTANT

## 2025-07-01 PROCEDURE — 2500000001 HC RX 250 WO HCPCS SELF ADMINISTERED DRUGS (ALT 637 FOR MEDICARE OP): Performed by: PHYSICIAN ASSISTANT

## 2025-07-01 PROCEDURE — 3700000001 HC GENERAL ANESTHESIA TIME - INITIAL BASE CHARGE: Performed by: STUDENT IN AN ORGANIZED HEALTH CARE EDUCATION/TRAINING PROGRAM

## 2025-07-01 PROCEDURE — 0PP404Z REMOVAL OF INTERNAL FIXATION DEVICE FROM THORACIC VERTEBRA, OPEN APPROACH: ICD-10-PCS | Performed by: STUDENT IN AN ORGANIZED HEALTH CARE EDUCATION/TRAINING PROGRAM

## 2025-07-01 PROCEDURE — 22614 ARTHRD PST TQ 1NTRSPC EA ADD: CPT | Performed by: STUDENT IN AN ORGANIZED HEALTH CARE EDUCATION/TRAINING PROGRAM

## 2025-07-01 PROCEDURE — 61783 SCAN PROC SPINAL: CPT | Performed by: STUDENT IN AN ORGANIZED HEALTH CARE EDUCATION/TRAINING PROGRAM

## 2025-07-01 PROCEDURE — 22843 INSERT SPINE FIXATION DEVICE: CPT | Performed by: STUDENT IN AN ORGANIZED HEALTH CARE EDUCATION/TRAINING PROGRAM

## 2025-07-01 PROCEDURE — 3600000018 HC OR TIME - INITIAL BASE CHARGE - PROCEDURE LEVEL SIX: Performed by: STUDENT IN AN ORGANIZED HEALTH CARE EDUCATION/TRAINING PROGRAM

## 2025-07-01 PROCEDURE — 2500000004 HC RX 250 GENERAL PHARMACY W/ HCPCS (ALT 636 FOR OP/ED): Performed by: ANESTHESIOLOGY

## 2025-07-01 PROCEDURE — 2500000005 HC RX 250 GENERAL PHARMACY W/O HCPCS: Performed by: STUDENT IN AN ORGANIZED HEALTH CARE EDUCATION/TRAINING PROGRAM

## 2025-07-01 PROCEDURE — XRGF058 FUSION OF LEFT SACROILIAC JOINT USING INTERNAL FIXATION DEVICE WITH TULIP CONNECTOR, OPEN APPROACH, NEW TECHNOLOGY GROUP 8: ICD-10-PCS | Performed by: STUDENT IN AN ORGANIZED HEALTH CARE EDUCATION/TRAINING PROGRAM

## 2025-07-01 PROCEDURE — 0SG00K1 FUSION OF LUMBAR VERTEBRAL JOINT WITH NONAUTOLOGOUS TISSUE SUBSTITUTE, POSTERIOR APPROACH, POSTERIOR COLUMN, OPEN APPROACH: ICD-10-PCS | Performed by: STUDENT IN AN ORGANIZED HEALTH CARE EDUCATION/TRAINING PROGRAM

## 2025-07-01 PROCEDURE — 84132 ASSAY OF SERUM POTASSIUM: CPT | Performed by: ANESTHESIOLOGIST ASSISTANT

## 2025-07-01 PROCEDURE — 2720000007 HC OR 272 NO HCPCS: Performed by: STUDENT IN AN ORGANIZED HEALTH CARE EDUCATION/TRAINING PROGRAM

## 2025-07-01 PROCEDURE — 22848 INSERT PELV FIXATION DEVICE: CPT | Performed by: STUDENT IN AN ORGANIZED HEALTH CARE EDUCATION/TRAINING PROGRAM

## 2025-07-01 PROCEDURE — 2500000002 HC RX 250 W HCPCS SELF ADMINISTERED DRUGS (ALT 637 FOR MEDICARE OP, ALT 636 FOR OP/ED): Performed by: STUDENT IN AN ORGANIZED HEALTH CARE EDUCATION/TRAINING PROGRAM

## 2025-07-01 PROCEDURE — C1713 ANCHOR/SCREW BN/BN,TIS/BN: HCPCS | Performed by: STUDENT IN AN ORGANIZED HEALTH CARE EDUCATION/TRAINING PROGRAM

## 2025-07-01 PROCEDURE — 2500000001 HC RX 250 WO HCPCS SELF ADMINISTERED DRUGS (ALT 637 FOR MEDICARE OP): Performed by: STUDENT IN AN ORGANIZED HEALTH CARE EDUCATION/TRAINING PROGRAM

## 2025-07-01 PROCEDURE — C1734 ORTH/DEVIC/DRUG BN/BN,TIS/BN: HCPCS | Performed by: STUDENT IN AN ORGANIZED HEALTH CARE EDUCATION/TRAINING PROGRAM

## 2025-07-01 PROCEDURE — C1889 IMPLANT/INSERT DEVICE, NOC: HCPCS | Performed by: STUDENT IN AN ORGANIZED HEALTH CARE EDUCATION/TRAINING PROGRAM

## 2025-07-01 PROCEDURE — 84132 ASSAY OF SERUM POTASSIUM: CPT

## 2025-07-01 PROCEDURE — 3700000002 HC GENERAL ANESTHESIA TIME - EACH INCREMENTAL 1 MINUTE: Performed by: STUDENT IN AN ORGANIZED HEALTH CARE EDUCATION/TRAINING PROGRAM

## 2025-07-01 PROCEDURE — C1821 INTERSPINOUS IMPLANT: HCPCS | Performed by: STUDENT IN AN ORGANIZED HEALTH CARE EDUCATION/TRAINING PROGRAM

## 2025-07-01 PROCEDURE — 2500000004 HC RX 250 GENERAL PHARMACY W/ HCPCS (ALT 636 FOR OP/ED)

## 2025-07-01 PROCEDURE — P9045 ALBUMIN (HUMAN), 5%, 250 ML: HCPCS | Mod: JZ,TB | Performed by: ANESTHESIOLOGIST ASSISTANT

## 2025-07-01 PROCEDURE — 7100000002 HC RECOVERY ROOM TIME - EACH INCREMENTAL 1 MINUTE: Performed by: STUDENT IN AN ORGANIZED HEALTH CARE EDUCATION/TRAINING PROGRAM

## 2025-07-01 PROCEDURE — 0QP104Z REMOVAL OF INTERNAL FIXATION DEVICE FROM SACRUM, OPEN APPROACH: ICD-10-PCS | Performed by: STUDENT IN AN ORGANIZED HEALTH CARE EDUCATION/TRAINING PROGRAM

## 2025-07-01 PROCEDURE — S0109 METHADONE ORAL 5MG: HCPCS | Performed by: ANESTHESIOLOGIST ASSISTANT

## 2025-07-01 PROCEDURE — C1737 HC OR 278 NO HCPCS: HCPCS | Performed by: STUDENT IN AN ORGANIZED HEALTH CARE EDUCATION/TRAINING PROGRAM

## 2025-07-01 PROCEDURE — 2780000003 HC OR 278 NO HCPCS: Performed by: STUDENT IN AN ORGANIZED HEALTH CARE EDUCATION/TRAINING PROGRAM

## 2025-07-01 PROCEDURE — 2500000004 HC RX 250 GENERAL PHARMACY W/ HCPCS (ALT 636 FOR OP/ED): Performed by: STUDENT IN AN ORGANIZED HEALTH CARE EDUCATION/TRAINING PROGRAM

## 2025-07-01 PROCEDURE — 83050 HGB METHEMOGLOBIN QUAN: CPT

## 2025-07-01 PROCEDURE — 82810 BLOOD GASES O2 SAT ONLY: CPT

## 2025-07-01 PROCEDURE — 3600000017 HC OR TIME - EACH INCREMENTAL 1 MINUTE - PROCEDURE LEVEL SIX: Performed by: STUDENT IN AN ORGANIZED HEALTH CARE EDUCATION/TRAINING PROGRAM

## 2025-07-01 PROCEDURE — A22612 PR ARTHRODESIS POSTERIOR/POSTEROLATERAL LUMBAR: Performed by: ANESTHESIOLOGY

## 2025-07-01 PROCEDURE — A22612 PR ARTHRODESIS POSTERIOR/POSTEROLATERAL LUMBAR: Performed by: ANESTHESIOLOGIST ASSISTANT

## 2025-07-01 PROCEDURE — 1200000002 HC GENERAL ROOM WITH TELEMETRY DAILY

## 2025-07-01 PROCEDURE — 7100000001 HC RECOVERY ROOM TIME - INITIAL BASE CHARGE: Performed by: STUDENT IN AN ORGANIZED HEALTH CARE EDUCATION/TRAINING PROGRAM

## 2025-07-01 DEVICE — 10.5 MM X 90 MM IFUSE BEDROCK GRANITE IMPLANT
Type: IMPLANTABLE DEVICE | Site: SPINE LUMBAR | Status: FUNCTIONAL
Brand: IFUSE GRANITE IMPLANT SYSTEM

## 2025-07-01 DEVICE — BONE GRAFT PUTTY, OSSIFUSE HSA FIBER 10CC: Type: IMPLANTABLE DEVICE | Site: SPINE LUMBAR | Status: FUNCTIONAL

## 2025-07-01 DEVICE — IMPLANTABLE DEVICE: Type: IMPLANTABLE DEVICE | Site: SPINE LUMBAR | Status: FUNCTIONAL

## 2025-07-01 DEVICE — BONE GRAFT KIT 7510200 INFUSE SMALL
Type: IMPLANTABLE DEVICE | Site: SPINE LUMBAR | Status: FUNCTIONAL
Brand: INFUSE® BONE GRAFT

## 2025-07-01 DEVICE — SCREW, RELINE LOCK, 5.5MM OPEN TULIP: Type: IMPLANTABLE DEVICE | Site: SPINE LUMBAR | Status: FUNCTIONAL

## 2025-07-01 DEVICE — ROD, RELINE-0, 5.5 X 300MM, STRAIGHT: Type: IMPLANTABLE DEVICE | Site: SPINE LUMBAR | Status: FUNCTIONAL

## 2025-07-01 DEVICE — SCREW, RELINE-O, 8.5X90MM 2S POLY ILIAC: Type: IMPLANTABLE DEVICE | Site: SPINE LUMBAR | Status: FUNCTIONAL

## 2025-07-01 RX ORDER — CHOLECALCIFEROL (VITAMIN D3) 25 MCG
25 TABLET ORAL DAILY
Status: DISPENSED | OUTPATIENT
Start: 2025-07-01

## 2025-07-01 RX ORDER — FENTANYL CITRATE 50 UG/ML
12.5 INJECTION, SOLUTION INTRAMUSCULAR; INTRAVENOUS EVERY 5 MIN PRN
Status: DISCONTINUED | OUTPATIENT
Start: 2025-07-01 | End: 2025-07-01 | Stop reason: HOSPADM

## 2025-07-01 RX ORDER — PROCHLORPERAZINE EDISYLATE 5 MG/ML
5 INJECTION INTRAMUSCULAR; INTRAVENOUS ONCE AS NEEDED
Status: DISCONTINUED | OUTPATIENT
Start: 2025-07-01 | End: 2025-07-01 | Stop reason: HOSPADM

## 2025-07-01 RX ORDER — TRAZODONE HYDROCHLORIDE 50 MG/1
50 TABLET ORAL NIGHTLY PRN
Status: DISPENSED | OUTPATIENT
Start: 2025-07-01

## 2025-07-01 RX ORDER — CYCLOBENZAPRINE HCL 10 MG
10 TABLET ORAL 3 TIMES DAILY
Status: DISPENSED | OUTPATIENT
Start: 2025-07-01

## 2025-07-01 RX ORDER — PROPOFOL 10 MG/ML
INJECTION, EMULSION INTRAVENOUS AS NEEDED
Status: DISCONTINUED | OUTPATIENT
Start: 2025-07-01 | End: 2025-07-01

## 2025-07-01 RX ORDER — CELECOXIB 200 MG/1
400 CAPSULE ORAL ONCE
Status: COMPLETED | OUTPATIENT
Start: 2025-07-01 | End: 2025-07-01

## 2025-07-01 RX ORDER — TRANEXAMIC ACID 650 MG/1
1300 TABLET ORAL ONCE
Status: COMPLETED | OUTPATIENT
Start: 2025-07-01 | End: 2025-07-01

## 2025-07-01 RX ORDER — LIDOCAINE HYDROCHLORIDE 20 MG/ML
INJECTION, SOLUTION INFILTRATION; PERINEURAL AS NEEDED
Status: DISCONTINUED | OUTPATIENT
Start: 2025-07-01 | End: 2025-07-01

## 2025-07-01 RX ORDER — GLYCOPYRROLATE 0.2 MG/ML
INJECTION INTRAMUSCULAR; INTRAVENOUS AS NEEDED
Status: DISCONTINUED | OUTPATIENT
Start: 2025-07-01 | End: 2025-07-01

## 2025-07-01 RX ORDER — APREPITANT 40 MG/1
CAPSULE ORAL AS NEEDED
Status: DISCONTINUED | OUTPATIENT
Start: 2025-07-01 | End: 2025-07-01

## 2025-07-01 RX ORDER — FLUTICASONE FUROATE AND VILANTEROL 100; 25 UG/1; UG/1
1 POWDER RESPIRATORY (INHALATION) DAILY PRN
Status: DISPENSED | OUTPATIENT
Start: 2025-07-01

## 2025-07-01 RX ORDER — SODIUM CHLORIDE, SODIUM LACTATE, POTASSIUM CHLORIDE, CALCIUM CHLORIDE 600; 310; 30; 20 MG/100ML; MG/100ML; MG/100ML; MG/100ML
100 INJECTION, SOLUTION INTRAVENOUS CONTINUOUS
Status: DISCONTINUED | OUTPATIENT
Start: 2025-07-01 | End: 2025-07-01

## 2025-07-01 RX ORDER — LANOLIN ALCOHOL/MO/W.PET/CERES
400 CREAM (GRAM) TOPICAL DAILY
Status: DISPENSED | OUTPATIENT
Start: 2025-07-01

## 2025-07-01 RX ORDER — HYDRALAZINE HYDROCHLORIDE 20 MG/ML
5 INJECTION INTRAMUSCULAR; INTRAVENOUS ONCE AS NEEDED
Status: DISCONTINUED | OUTPATIENT
Start: 2025-07-01 | End: 2025-07-01 | Stop reason: HOSPADM

## 2025-07-01 RX ORDER — LABETALOL HYDROCHLORIDE 5 MG/ML
5 INJECTION, SOLUTION INTRAVENOUS EVERY 10 MIN PRN
Status: DISCONTINUED | OUTPATIENT
Start: 2025-07-01 | End: 2025-07-01 | Stop reason: HOSPADM

## 2025-07-01 RX ORDER — ACETAMINOPHEN 325 MG/1
650 TABLET ORAL EVERY 6 HOURS
Status: DISPENSED | OUTPATIENT
Start: 2025-07-01

## 2025-07-01 RX ORDER — CEFAZOLIN 1 G/1
INJECTION, POWDER, FOR SOLUTION INTRAVENOUS AS NEEDED
Status: DISCONTINUED | OUTPATIENT
Start: 2025-07-01 | End: 2025-07-01

## 2025-07-01 RX ORDER — OXYCODONE HYDROCHLORIDE 5 MG/1
5 TABLET ORAL EVERY 4 HOURS PRN
Status: DISPENSED | OUTPATIENT
Start: 2025-07-01

## 2025-07-01 RX ORDER — ROCURONIUM BROMIDE 10 MG/ML
INJECTION, SOLUTION INTRAVENOUS AS NEEDED
Status: DISCONTINUED | OUTPATIENT
Start: 2025-07-01 | End: 2025-07-01

## 2025-07-01 RX ORDER — ACETAMINOPHEN 10 MG/ML
INJECTION, SOLUTION INTRAVENOUS AS NEEDED
Status: DISCONTINUED | OUTPATIENT
Start: 2025-07-01 | End: 2025-07-01

## 2025-07-01 RX ORDER — AMOXICILLIN 250 MG
2 CAPSULE ORAL 2 TIMES DAILY
Status: DISPENSED | OUTPATIENT
Start: 2025-07-01

## 2025-07-01 RX ORDER — METOCLOPRAMIDE HYDROCHLORIDE 5 MG/ML
INJECTION INTRAMUSCULAR; INTRAVENOUS AS NEEDED
Status: DISCONTINUED | OUTPATIENT
Start: 2025-07-01 | End: 2025-07-01

## 2025-07-01 RX ORDER — ALBUMIN HUMAN 50 G/1000ML
SOLUTION INTRAVENOUS AS NEEDED
Status: DISCONTINUED | OUTPATIENT
Start: 2025-07-01 | End: 2025-07-01

## 2025-07-01 RX ORDER — LIDOCAINE HYDROCHLORIDE 10 MG/ML
0.1 INJECTION, SOLUTION INFILTRATION; PERINEURAL ONCE
Status: DISCONTINUED | OUTPATIENT
Start: 2025-07-01 | End: 2025-07-01 | Stop reason: HOSPADM

## 2025-07-01 RX ORDER — MIDAZOLAM HYDROCHLORIDE 1 MG/ML
INJECTION INTRAMUSCULAR; INTRAVENOUS AS NEEDED
Status: DISCONTINUED | OUTPATIENT
Start: 2025-07-01 | End: 2025-07-01

## 2025-07-01 RX ORDER — FENTANYL CITRATE 50 UG/ML
50 INJECTION, SOLUTION INTRAMUSCULAR; INTRAVENOUS EVERY 5 MIN PRN
Status: DISCONTINUED | OUTPATIENT
Start: 2025-07-01 | End: 2025-07-01 | Stop reason: HOSPADM

## 2025-07-01 RX ORDER — ONDANSETRON HYDROCHLORIDE 2 MG/ML
INJECTION, SOLUTION INTRAVENOUS AS NEEDED
Status: DISCONTINUED | OUTPATIENT
Start: 2025-07-01 | End: 2025-07-01

## 2025-07-01 RX ORDER — ACETAMINOPHEN 325 MG/1
975 TABLET ORAL ONCE
Status: COMPLETED | OUTPATIENT
Start: 2025-07-01 | End: 2025-07-01

## 2025-07-01 RX ORDER — BUPIVACAINE HYDROCHLORIDE 2.5 MG/ML
INJECTION, SOLUTION INFILTRATION; PERINEURAL AS NEEDED
Status: DISCONTINUED | OUTPATIENT
Start: 2025-07-01 | End: 2025-07-01 | Stop reason: HOSPADM

## 2025-07-01 RX ORDER — OXYCODONE HYDROCHLORIDE 5 MG/1
10 TABLET ORAL EVERY 4 HOURS PRN
Status: DISCONTINUED | OUTPATIENT
Start: 2025-07-01 | End: 2025-07-01 | Stop reason: HOSPADM

## 2025-07-01 RX ORDER — ALBUTEROL SULFATE 0.83 MG/ML
2.5 SOLUTION RESPIRATORY (INHALATION) ONCE AS NEEDED
Status: DISCONTINUED | OUTPATIENT
Start: 2025-07-01 | End: 2025-07-01 | Stop reason: HOSPADM

## 2025-07-01 RX ORDER — METHOCARBAMOL 100 MG/ML
INJECTION, SOLUTION INTRAMUSCULAR; INTRAVENOUS AS NEEDED
Status: DISCONTINUED | OUTPATIENT
Start: 2025-07-01 | End: 2025-07-01

## 2025-07-01 RX ORDER — FENTANYL CITRATE 50 UG/ML
INJECTION, SOLUTION INTRAMUSCULAR; INTRAVENOUS AS NEEDED
Status: DISCONTINUED | OUTPATIENT
Start: 2025-07-01 | End: 2025-07-01

## 2025-07-01 RX ORDER — NORETHINDRONE AND ETHINYL ESTRADIOL 0.5-0.035
10 KIT ORAL ONCE AS NEEDED
Status: DISCONTINUED | OUTPATIENT
Start: 2025-07-01 | End: 2025-07-01 | Stop reason: HOSPADM

## 2025-07-01 RX ORDER — POLYETHYLENE GLYCOL 3350 17 G/17G
17 POWDER, FOR SOLUTION ORAL DAILY
Status: DISPENSED | OUTPATIENT
Start: 2025-07-02

## 2025-07-01 RX ORDER — PANTOPRAZOLE SODIUM 40 MG/1
40 TABLET, DELAYED RELEASE ORAL
Status: DISPENSED | OUTPATIENT
Start: 2025-07-02

## 2025-07-01 RX ORDER — FAMOTIDINE 10 MG/ML
INJECTION INTRAVENOUS AS NEEDED
Status: DISCONTINUED | OUTPATIENT
Start: 2025-07-01 | End: 2025-07-01

## 2025-07-01 RX ORDER — HEPARIN SODIUM 5000 [USP'U]/ML
5000 INJECTION, SOLUTION INTRAVENOUS; SUBCUTANEOUS EVERY 8 HOURS
Status: DISPENSED | OUTPATIENT
Start: 2025-07-02

## 2025-07-01 RX ORDER — DEXTROSE 50 % IN WATER (D50W) INTRAVENOUS SYRINGE
12.5
Status: ACTIVE | OUTPATIENT
Start: 2025-07-01

## 2025-07-01 RX ORDER — CITALOPRAM 40 MG/1
40 TABLET ORAL DAILY
Status: DISPENSED | OUTPATIENT
Start: 2025-07-01

## 2025-07-01 RX ORDER — SODIUM CHLORIDE 9 MG/ML
100 INJECTION, SOLUTION INTRAVENOUS CONTINUOUS
Status: ACTIVE | OUTPATIENT
Start: 2025-07-01 | End: 2025-07-03

## 2025-07-01 RX ORDER — ONDANSETRON HYDROCHLORIDE 2 MG/ML
4 INJECTION, SOLUTION INTRAVENOUS EVERY 8 HOURS PRN
Status: ACTIVE | OUTPATIENT
Start: 2025-07-01

## 2025-07-01 RX ORDER — VANCOMYCIN HYDROCHLORIDE 1 G/20ML
INJECTION, POWDER, LYOPHILIZED, FOR SOLUTION INTRAVENOUS AS NEEDED
Status: DISCONTINUED | OUTPATIENT
Start: 2025-07-01 | End: 2025-07-01 | Stop reason: HOSPADM

## 2025-07-01 RX ORDER — PROMETHAZINE HYDROCHLORIDE 25 MG/1
25 TABLET ORAL EVERY 6 HOURS PRN
Status: DISPENSED | OUTPATIENT
Start: 2025-07-01

## 2025-07-01 RX ORDER — NALOXONE HYDROCHLORIDE 0.4 MG/ML
0.2 INJECTION, SOLUTION INTRAMUSCULAR; INTRAVENOUS; SUBCUTANEOUS EVERY 5 MIN PRN
Status: ACTIVE | OUTPATIENT
Start: 2025-07-01

## 2025-07-01 RX ORDER — PREGABALIN 50 MG/1
100 CAPSULE ORAL DAILY
Status: DISPENSED | OUTPATIENT
Start: 2025-07-01

## 2025-07-01 RX ORDER — OXYCODONE HYDROCHLORIDE 5 MG/1
2.5 TABLET ORAL EVERY 4 HOURS PRN
Status: ACTIVE | OUTPATIENT
Start: 2025-07-01

## 2025-07-01 RX ORDER — HYDROMORPHONE HYDROCHLORIDE 1 MG/ML
INJECTION, SOLUTION INTRAMUSCULAR; INTRAVENOUS; SUBCUTANEOUS AS NEEDED
Status: DISCONTINUED | OUTPATIENT
Start: 2025-07-01 | End: 2025-07-01

## 2025-07-01 RX ORDER — FENTANYL CITRATE 50 UG/ML
25 INJECTION, SOLUTION INTRAMUSCULAR; INTRAVENOUS EVERY 5 MIN PRN
Status: DISCONTINUED | OUTPATIENT
Start: 2025-07-01 | End: 2025-07-01 | Stop reason: HOSPADM

## 2025-07-01 RX ORDER — METHADONE HYDROCHLORIDE 10 MG/1
TABLET ORAL AS NEEDED
Status: DISCONTINUED | OUTPATIENT
Start: 2025-07-01 | End: 2025-07-01

## 2025-07-01 RX ORDER — DEXTROSE 50 % IN WATER (D50W) INTRAVENOUS SYRINGE
25
Status: ACTIVE | OUTPATIENT
Start: 2025-07-01

## 2025-07-01 RX ORDER — PNV NO.95/FERROUS FUM/FOLIC AC 28MG-0.8MG
500 TABLET ORAL
Status: DISPENSED | OUTPATIENT
Start: 2025-07-01

## 2025-07-01 RX ORDER — FLECAINIDE ACETATE 50 MG/1
50 TABLET ORAL 2 TIMES DAILY
Status: DISPENSED | OUTPATIENT
Start: 2025-07-01

## 2025-07-01 RX ORDER — PROMETHAZINE HYDROCHLORIDE 25 MG/1
25 SUPPOSITORY RECTAL EVERY 12 HOURS PRN
Status: DISPENSED | OUTPATIENT
Start: 2025-07-01

## 2025-07-01 RX ORDER — MAGNESIUM SULFATE HEPTAHYDRATE 500 MG/ML
INJECTION, SOLUTION INTRAMUSCULAR; INTRAVENOUS AS NEEDED
Status: DISCONTINUED | OUTPATIENT
Start: 2025-07-01 | End: 2025-07-01

## 2025-07-01 RX ORDER — OXYCODONE HYDROCHLORIDE 10 MG/1
10 TABLET ORAL EVERY 4 HOURS PRN
Status: DISPENSED | OUTPATIENT
Start: 2025-07-01

## 2025-07-01 RX ORDER — PHENYLEPHRINE HCL IN 0.9% NACL 0.4MG/10ML
SYRINGE (ML) INTRAVENOUS AS NEEDED
Status: DISCONTINUED | OUTPATIENT
Start: 2025-07-01 | End: 2025-07-01

## 2025-07-01 RX ORDER — PHENYLEPHRINE 10 MG/250 ML(40 MCG/ML)IN 0.9 % SOD.CHLORIDE INTRAVENOUS
CONTINUOUS PRN
Status: DISCONTINUED | OUTPATIENT
Start: 2025-07-01 | End: 2025-07-01

## 2025-07-01 RX ORDER — LIDOCAINE HYDROCHLORIDE AND EPINEPHRINE 5; 5 MG/ML; UG/ML
INJECTION, SOLUTION INFILTRATION; PERINEURAL AS NEEDED
Status: DISCONTINUED | OUTPATIENT
Start: 2025-07-01 | End: 2025-07-01 | Stop reason: HOSPADM

## 2025-07-01 RX ORDER — ONDANSETRON HYDROCHLORIDE 2 MG/ML
4 INJECTION, SOLUTION INTRAVENOUS ONCE AS NEEDED
Status: DISCONTINUED | OUTPATIENT
Start: 2025-07-01 | End: 2025-07-01 | Stop reason: HOSPADM

## 2025-07-01 RX ORDER — OXYCODONE HYDROCHLORIDE 5 MG/1
5 TABLET ORAL EVERY 4 HOURS PRN
Status: DISCONTINUED | OUTPATIENT
Start: 2025-07-01 | End: 2025-07-01 | Stop reason: HOSPADM

## 2025-07-01 RX ORDER — ONDANSETRON 4 MG/1
4 TABLET, FILM COATED ORAL EVERY 8 HOURS PRN
Status: ACTIVE | OUTPATIENT
Start: 2025-07-01

## 2025-07-01 RX ADMIN — Medication 80 MCG: at 09:18

## 2025-07-01 RX ADMIN — Medication 160 MCG: at 07:57

## 2025-07-01 RX ADMIN — ROCURONIUM BROMIDE 100 MG: 10 INJECTION INTRAVENOUS at 07:45

## 2025-07-01 RX ADMIN — HYDROMORPHONE HYDROCHLORIDE 0.25 MG: 1 INJECTION, SOLUTION INTRAMUSCULAR; INTRAVENOUS; SUBCUTANEOUS at 16:24

## 2025-07-01 RX ADMIN — ROCURONIUM BROMIDE 10 MG: 10 INJECTION INTRAVENOUS at 09:53

## 2025-07-01 RX ADMIN — METOCLOPRAMIDE 10 MG: 5 INJECTION, SOLUTION INTRAMUSCULAR; INTRAVENOUS at 11:42

## 2025-07-01 RX ADMIN — GLYCOPYRROLATE 0.2 MG: 0.2 INJECTION, SOLUTION INTRAMUSCULAR; INTRAVENOUS at 13:18

## 2025-07-01 RX ADMIN — Medication 50 MG: at 07:42

## 2025-07-01 RX ADMIN — Medication 120 MCG: at 11:58

## 2025-07-01 RX ADMIN — Medication 200 MCG: at 12:39

## 2025-07-01 RX ADMIN — Medication 200 MCG: at 12:32

## 2025-07-01 RX ADMIN — MAGNESIUM SULFATE HEPTAHYDRATE 2 G: 500 INJECTION, SOLUTION INTRAMUSCULAR; INTRAVENOUS at 08:49

## 2025-07-01 RX ADMIN — LIDOCAINE HYDROCHLORIDE 80 MG: 20 INJECTION, SOLUTION INFILTRATION; PERINEURAL at 07:42

## 2025-07-01 RX ADMIN — ACETAMINOPHEN 975 MG: 325 TABLET ORAL at 06:51

## 2025-07-01 RX ADMIN — SODIUM CHLORIDE, SODIUM LACTATE, POTASSIUM CHLORIDE, AND CALCIUM CHLORIDE 100 ML/HR: .6; .31; .03; .02 INJECTION, SOLUTION INTRAVENOUS at 14:06

## 2025-07-01 RX ADMIN — APREPITANT 40 MG: 40 CAPSULE ORAL at 06:55

## 2025-07-01 RX ADMIN — SUGAMMADEX 200 MG: 100 INJECTION, SOLUTION INTRAVENOUS at 13:40

## 2025-07-01 RX ADMIN — Medication 200 MCG: at 13:09

## 2025-07-01 RX ADMIN — Medication 200 MCG: at 12:17

## 2025-07-01 RX ADMIN — ONDANSETRON 4 MG: 2 INJECTION INTRAMUSCULAR; INTRAVENOUS at 12:58

## 2025-07-01 RX ADMIN — TRANEXAMIC ACID 1300 MG: 650 TABLET ORAL at 06:52

## 2025-07-01 RX ADMIN — HEPARIN SODIUM 5000 UNITS: 5000 INJECTION, SOLUTION INTRAVENOUS; SUBCUTANEOUS at 23:10

## 2025-07-01 RX ADMIN — HYDROMORPHONE HYDROCHLORIDE 0.25 MG: 1 INJECTION, SOLUTION INTRAMUSCULAR; INTRAVENOUS; SUBCUTANEOUS at 20:05

## 2025-07-01 RX ADMIN — HYDROMORPHONE HYDROCHLORIDE 0.4 MG: 1 INJECTION, SOLUTION INTRAMUSCULAR; INTRAVENOUS; SUBCUTANEOUS at 23:08

## 2025-07-01 RX ADMIN — CYCLOBENZAPRINE 10 MG: 10 TABLET, FILM COATED ORAL at 23:10

## 2025-07-01 RX ADMIN — Medication 10 MG: at 10:33

## 2025-07-01 RX ADMIN — Medication 80 MCG: at 12:01

## 2025-07-01 RX ADMIN — ALBUMIN HUMAN 250 ML: 0.05 INJECTION, SOLUTION INTRAVENOUS at 10:25

## 2025-07-01 RX ADMIN — Medication 200 MCG: at 13:19

## 2025-07-01 RX ADMIN — PROPOFOL 100 MG: 10 INJECTION, EMULSION INTRAVENOUS at 07:44

## 2025-07-01 RX ADMIN — Medication 10 MG: at 09:30

## 2025-07-01 RX ADMIN — HYDROMORPHONE HYDROCHLORIDE 0.25 MG: 1 INJECTION, SOLUTION INTRAMUSCULAR; INTRAVENOUS; SUBCUTANEOUS at 07:43

## 2025-07-01 RX ADMIN — Medication 160 MCG: at 07:56

## 2025-07-01 RX ADMIN — Medication 200 MCG: at 12:48

## 2025-07-01 RX ADMIN — POVIDONE-IODINE 1 APPLICATION: 5 SOLUTION TOPICAL at 06:52

## 2025-07-01 RX ADMIN — FENTANYL CITRATE 25 MCG: 50 INJECTION INTRAMUSCULAR; INTRAVENOUS at 15:41

## 2025-07-01 RX ADMIN — ACETAMINOPHEN 1000 MG: 10 INJECTION, SOLUTION INTRAVENOUS at 13:04

## 2025-07-01 RX ADMIN — GLYCOPYRROLATE 0.2 MG: 0.2 INJECTION, SOLUTION INTRAMUSCULAR; INTRAVENOUS at 08:16

## 2025-07-01 RX ADMIN — CEFAZOLIN 2 G: 330 INJECTION, POWDER, FOR SOLUTION INTRAMUSCULAR; INTRAVENOUS at 08:38

## 2025-07-01 RX ADMIN — DEXAMETHASONE SODIUM PHOSPHATE 8 MG: 4 INJECTION INTRA-ARTICULAR; INTRALESIONAL; INTRAMUSCULAR; INTRAVENOUS; SOFT TISSUE at 09:13

## 2025-07-01 RX ADMIN — SODIUM CHLORIDE, SODIUM LACTATE, POTASSIUM CHLORIDE, AND CALCIUM CHLORIDE: 600; 310; 30; 20 INJECTION, SOLUTION INTRAVENOUS at 07:15

## 2025-07-01 RX ADMIN — TRAZODONE HYDROCHLORIDE 50 MG: 50 TABLET ORAL at 23:14

## 2025-07-01 RX ADMIN — HYDROMORPHONE HYDROCHLORIDE 0.25 MG: 1 INJECTION, SOLUTION INTRAMUSCULAR; INTRAVENOUS; SUBCUTANEOUS at 19:02

## 2025-07-01 RX ADMIN — Medication 200 MCG: at 12:06

## 2025-07-01 RX ADMIN — Medication 200 MCG: at 12:11

## 2025-07-01 RX ADMIN — Medication 200 MCG: at 12:56

## 2025-07-01 RX ADMIN — FENTANYL CITRATE 25 MCG: 50 INJECTION INTRAMUSCULAR; INTRAVENOUS at 16:12

## 2025-07-01 RX ADMIN — FENTANYL CITRATE 100 MCG: 50 INJECTION, SOLUTION INTRAMUSCULAR; INTRAVENOUS at 07:41

## 2025-07-01 RX ADMIN — CELECOXIB 400 MG: 200 CAPSULE ORAL at 06:51

## 2025-07-01 RX ADMIN — HYDROMORPHONE HYDROCHLORIDE 0.25 MG: 1 INJECTION, SOLUTION INTRAMUSCULAR; INTRAVENOUS; SUBCUTANEOUS at 17:52

## 2025-07-01 RX ADMIN — CEFAZOLIN 2 G: 330 INJECTION, POWDER, FOR SOLUTION INTRAMUSCULAR; INTRAVENOUS at 12:41

## 2025-07-01 RX ADMIN — Medication 200 MCG: at 12:24

## 2025-07-01 RX ADMIN — TRANEXAMIC ACID 5 MG/KG/HR: 100 INJECTION INTRAVENOUS at 08:45

## 2025-07-01 RX ADMIN — METHOCARBAMOL 1000 MG: 100 INJECTION INTRAMUSCULAR; INTRAVENOUS at 12:55

## 2025-07-01 RX ADMIN — FENTANYL CITRATE 12.5 MCG: 50 INJECTION INTRAMUSCULAR; INTRAVENOUS at 15:00

## 2025-07-01 RX ADMIN — ALBUMIN HUMAN 250 ML: 0.05 INJECTION, SOLUTION INTRAVENOUS at 09:52

## 2025-07-01 RX ADMIN — SODIUM CHLORIDE: 0.9 INJECTION, SOLUTION INTRAVENOUS at 08:30

## 2025-07-01 RX ADMIN — FAMOTIDINE 20 MG: 10 INJECTION, SOLUTION INTRAVENOUS at 11:43

## 2025-07-01 RX ADMIN — Medication 120 MCG: at 07:51

## 2025-07-01 RX ADMIN — MIDAZOLAM HYDROCHLORIDE 2 MG: 2 INJECTION, SOLUTION INTRAMUSCULAR; INTRAVENOUS at 07:24

## 2025-07-01 RX ADMIN — PHENYLEPHRINE-NACL IV SOLUTION 10 MG/250ML-0.9% 0.4 MCG/KG/MIN: 10-0.9/25 SOLUTION at 08:02

## 2025-07-01 RX ADMIN — ACETAMINOPHEN 650 MG: 325 TABLET, FILM COATED ORAL at 23:00

## 2025-07-01 RX ADMIN — METHADONE HYDROCHLORIDE 10 MG: 10 TABLET ORAL at 06:55

## 2025-07-01 RX ADMIN — Medication 10 MG: at 11:30

## 2025-07-01 RX ADMIN — ALBUMIN HUMAN 250 ML: 0.05 INJECTION, SOLUTION INTRAVENOUS at 12:58

## 2025-07-01 RX ADMIN — HYDROMORPHONE HYDROCHLORIDE 0.25 MG: 1 INJECTION, SOLUTION INTRAMUSCULAR; INTRAVENOUS; SUBCUTANEOUS at 13:50

## 2025-07-01 ASSESSMENT — PAIN - FUNCTIONAL ASSESSMENT
PAIN_FUNCTIONAL_ASSESSMENT: 0-10
PAIN_FUNCTIONAL_ASSESSMENT: UNABLE TO SELF-REPORT
PAIN_FUNCTIONAL_ASSESSMENT: 0-10
PAIN_FUNCTIONAL_ASSESSMENT: UNABLE TO SELF-REPORT
PAIN_FUNCTIONAL_ASSESSMENT: 0-10
PAIN_FUNCTIONAL_ASSESSMENT: UNABLE TO SELF-REPORT
PAIN_FUNCTIONAL_ASSESSMENT: 0-10
PAIN_FUNCTIONAL_ASSESSMENT: UNABLE TO SELF-REPORT
PAIN_FUNCTIONAL_ASSESSMENT: 0-10
PAIN_FUNCTIONAL_ASSESSMENT: UNABLE TO SELF-REPORT
PAIN_FUNCTIONAL_ASSESSMENT: UNABLE TO SELF-REPORT
PAIN_FUNCTIONAL_ASSESSMENT: 0-10
PAIN_FUNCTIONAL_ASSESSMENT: UNABLE TO SELF-REPORT

## 2025-07-01 ASSESSMENT — PAIN SCALES - GENERAL
PAINLEVEL_OUTOF10: 8
PAINLEVEL_OUTOF10: 6
PAINLEVEL_OUTOF10: 8
PAINLEVEL_OUTOF10: 7
PAINLEVEL_OUTOF10: 4
PAINLEVEL_OUTOF10: 5 - MODERATE PAIN
PAINLEVEL_OUTOF10: 5 - MODERATE PAIN
PAINLEVEL_OUTOF10: 6
PAINLEVEL_OUTOF10: 7
PAINLEVEL_OUTOF10: 10 - WORST POSSIBLE PAIN
PAINLEVEL_OUTOF10: 8
PAINLEVEL_OUTOF10: 8
PAINLEVEL_OUTOF10: 4
PAINLEVEL_OUTOF10: 5 - MODERATE PAIN
PAINLEVEL_OUTOF10: 8
PAIN_LEVEL: 0
PAINLEVEL_OUTOF10: 6
PAINLEVEL_OUTOF10: 7
PAINLEVEL_OUTOF10: 8
PAINLEVEL_OUTOF10: 6
PAINLEVEL_OUTOF10: 6

## 2025-07-01 ASSESSMENT — PAIN DESCRIPTION - LOCATION: LOCATION: BACK

## 2025-07-01 NOTE — ANESTHESIA PROCEDURE NOTES
Airway  Date/Time: 7/1/2025 7:50 AM  Reason: elective    Airway not difficult    Staffing  Performed: ROBERTO   Authorized by: Neva Verdugo MD    Performed by: MARY Souza  Patient location during procedure: OR    Patient Condition  Indications for airway management: anesthesia  Patient position: sniffing  MILS not maintained throughout  Planned trial extubation  Sedation level: deep     Final Airway Details   Preoxygenated: yes  Final airway type: endotracheal airway  Successful airway: ETT   Successful intubation technique: direct laryngoscopy  Adjuncts used in placement: intubating stylet  Endotracheal tube insertion site: oral  Blade: Chaitanya  Blade size: #4  ETT size (mm): 7.0  Cormack-Lehane Classification: grade IIa - partial view of glottis  Placement verified by: chest auscultation and capnometry   Measured from: lips  ETT to lips (cm): 22  Ventilation between attempts: none  Number of attempts at approach: 1  Number of other approaches attempted: 0    Additional Comments  Lips and teeth in preintubation conditions

## 2025-07-01 NOTE — ANESTHESIA PREPROCEDURE EVALUATION
Patient: Lanie Pablo    Procedure Information       Date/Time: 25 0650    Procedure: Exploration of Prior T10-Pelvis Fusion with revision of amanda fracture at L3-L4 and S1 Fusion bilaterally as well as revision posterolateral arthrodesis L5-S1 - 63311, 67654, 38866, 13779  Globus, Ossifuse, Reline Open screw system,SI Bone    Location: [facility] OR 24 / [facility] OR    Surgeons: Mike Quigley MD          Lanie Pablo is a 70 year old female with history significant for CKD (Cr 1.14; eGFR 51), GERD, asthma, and chronic low back pain, who is scheduled for Exploration of Prior T10-Pelvis Fusion with revision of amanda fracture at L3-L4 and S1 Fusion bilaterally as well as revision posterolateral arthrodesis L5-S1.     Appropriately NPO. Risks/ benefits/ alternatives of anesthetic plan discussed. All questions invited and answered. Patient agrees to proceed as planned and consents to blood transfusion as well as all necessary procedures and invasive monitors.      MRSA negative  PT/ INR 11.9/ 1.1    Relevant Problems   Musculoskeletal   (+) Chronic bilateral low back pain with bilateral sciatica      ID   (+) COVID       Clinical information reviewed:     Meds               NPO Detail:  No data recorded     Physical Exam    Airway  Mallampati: II  TM distance: <3 FB  Neck ROM: limited  Mouth openin finger widths     Cardiovascular   Rhythm: regular  Rate: normal     Dental    Pulmonary Breath sounds clear to auscultation     Abdominal            Anesthesia Plan    History of general anesthesia?: unknown/emergency  History of complications of general anesthesia?: unknown/emergency    ASA 2     general     intravenous induction   Postoperative pain plan includes opioids.  Anesthetic plan and risks discussed with patient.  Use of blood products discussed with patient who consented to blood products.    Plan discussed with CAA.

## 2025-07-01 NOTE — ANESTHESIA PROCEDURE NOTES
Peripheral IV  Date/Time: 7/1/2025 8:20 AM  Inserted by: MARY Souza    Placement  Needle size: 18 G  Laterality: right  Location: hand  Local anesthetic: none  Site prep: alcohol  Technique: anatomical landmarks  Attempts: 1

## 2025-07-01 NOTE — ANESTHESIA PROCEDURE NOTES
Arterial Line:    Date/Time: 7/1/2025 8:00 AM    Staffing  Performed: attending   Authorized by: Neva Verdugo MD    Performed by: MARY Souza    An arterial line was placed. Procedure performed using surface landmarks.in the OR for the following indication(s): continuous blood pressure monitoring.    A 20 gauge (size) (length), Angiocath (type) catheter was placed into the Left radial artery, secured by Tegaderm,   Seldinger technique not used.  Events:  patient tolerated procedure well with no complications.

## 2025-07-01 NOTE — OP NOTE
Exploration of Prior T10-Pelvis Fusion with revision of amanda fracture at L3-L4 and S1 Fusion bilaterally as well as revision posterolateral arthrodesis L5-S1 Operative Note     Date: 2025  OR Location: [facility] OR    Name: Lanie Pablo, : 1955, Age: 70 y.o., MRN: [id number], Sex: female    Diagnosis  Pre-op Diagnosis      * Chronic bilateral low back pain with bilateral sciatica [M54.42, M54.41, G89.29] Post-op Diagnosis     * Chronic bilateral low back pain with bilateral sciatica [M54.42, M54.41, G89.29]     Procedures  Exploration of Prior T10-Pelvis Fusion with revision of amanda fracture at L3-L4 and S1 Fusion bilaterally as well as revision posterolateral arthrodesis L5-S1  21472 - CA ARTHRODESIS POSTERIOR/PSTLAT TQ 1NTRSPC LUMBAR    CA POSTERIOR SEGMENTAL INSTRUMENTATION 7-12 VRT SEG [88939]  CA PELVIC FIXATION OTHER THAN SACRUM []  CA ALLOGRAFT FOR SPINE SURGERY ONLY MORSELIZED []  CA ARTHRODESIS SI JT OPN W/OBTAINING B1 GRF INSTRMJ [63390]  Surgeons      * Mike Quigley - Primary    Resident/Fellow/Other Assistant:  Surgeons and Role:     * Luis A Hemphill MD - Resident - Assisting    Staff:   Circulator: Gaviota Loredo Person: Jo Reyes Circulator: Violeta Vázquezub Person: Adrienne    Anesthesia Staff: Anesthesiologist: Neva Verdugo MD  C-AA: MARY Salazar; MARY Souza  ROBERTO: Anuja Renner    Procedure Summary  Anesthesia: Anesthesia type not filed in the log.  ASA: II  Estimated Blood Loss: 800mL  Intra-op Medications:   Administrations occurring from 0650 to 1520 on 25:   Medication Name Total Dose   lidocaine-epinephrine (Xylocaine W/EPI) 0.5 %-1:200,000 injection 20 mL   thrombin-recombinant (Recothrom) 5,000 unit topical solution 10,000 Units   gelatin absorbable (Gelfoam) 100 sponge 1 each   vancomycin (Vancocin) vial for injection 2 g   polymyxin B 500,000 Units in sodium chloride 0.9% 1,000 mL irrigation 500,000 Units   BUPivacaine HCl  (Marcaine) 0.25 % (2.5 mg/mL) injection 30 mL   acetaminophen (Ofirmev) injection 1,000 mg   albumin human bottle 5% 750 mL   aprepitant (Emend) capsule 40 mg   ceFAZolin (Ancef) vial 1 g 4 g   dexAMETHasone (Decadron) 4 mg/mL IV Syringe 2 mL 8 mg   famotidine (Pepcid) 10 mg/mL 20 mg   fentaNYL (Sublimaze) injection 50 mcg/mL 100 mcg   glycopyrrolate (Robinul) injection 0.4 mg   HYDROmorphone (Dilaudid) injection 1 mg/mL 0.25 mg   ketamine injection 50 mg/ 5 mL (10 mg/mL) 80 mg   LR bolus Cannot be calculated   lidocaine (Xylocaine) injection 2 % 80 mg   magnesium sulfate 50 % injection 2 g   methadone (Dolophine) tablet 10 mg   methocarbamol (Robaxin) 100 mg/mL 1,000 mg   metoclopramide (Reglan) 5 mg/mL 10 mg   midazolam PF (Versed) injection 1 mg/mL 2 mg   ondansetron (Zofran) 2 mg/mL injection 4 mg   phenylephrine (Molina-Synephrine) 10 mg in sodium chloride 0.9% 250 mL (0.04 mg/mL) infusion (premix) 10.33 mg   phenylephrine 40 mcg/mL syringe 10 mL 2,720 mcg   propofol (Diprivan) injection 10 mg/mL 100 mg   rocuronium (ZeMuron) 50 mg/5 mL injection 110 mg   NaCl 0.9 % bolus Cannot be calculated   tranexamic acid (Cyklokapron) 5,000 mg in sodium chloride 0.9% 250 mL (20 mg/mL) infusion 1,566.02 mg   acetaminophen (Tylenol) tablet 975 mg 975 mg   celecoxib (CeleBREX) capsule 400 mg 400 mg   povidone-iodine 5 % kit kit 1 Application   tranexamic acid (Lysteda) tablet 1,300 mg 1,300 mg              Anesthesia Record               Intraprocedure I/O Totals          Intake    LR bolus 1000.00 mL    NaCl 0.9 % bolus 1000.00 mL    Tranexamic Acid 0.00 mL    The total shown is the total volume documented since Anesthesia Start was filed.    Phenylephrine Drip 0.00 mL    The total shown is the total volume documented since Anesthesia Start was filed.    Total Intake 2000 mL       Output    Urine 210 mL    Est. Blood Loss 730 mL    Total Output 940 mL       Net    Net Volume 1060 mL          Specimen: No specimens collected               Drains and/or Catheters:   Closed/Suction Drain 1 Midline Back Accordion 10 Fr. (Active)       Closed/Suction Drain 2 Midline Back Accordion 10 Fr. (Active)       Closed/Suction Drain 3 Inferior;Midline Back Accordion 10 Fr. (Active)       Urethral Catheter 16 Fr. (Active)       Implants:  Implants       Type Name Action Serial No.      Candice UNiD EXP Ti Candice 5.5mm 4+ LV Wasted      Graft INFUSE BMP SMALL - [id number] Implanted       iFuse Bedrock Williamson Implant Implanted [id number]      iFuse Bedrock Williamson Implant Implanted [id number]     Screw SCREW, RELINE-O, 8.5X90MM 2S POLY ILIAC - [id number] Implanted      Screw SCREW, RELINE-O, 9.5X45MM 2S POLYAXIAL - [id number] Implanted      Graft BONE GRAFT PUTTY, OSSIFUSE HSA FIBER 10CC - [id number] - [id number] Implanted [id number]     Graft BONE GRAFT PUTTY, OSSIFUSE HSA FIBER 10CC - [id number] - [id number] Implanted [id number]     Neuro Interventional Implant SCREW, RELINE LOCK, 5.5MM OPEN TULIP - [id number] Implanted      Screw CANDICE, RELINE-0, 5.5 X 300MM, STRAIGHT - [id number] Implanted                   Informed Consent:  The risks, benefits, complications, and alternatives were discussed with the patient. I have explained the surgical procedure in detail with expected duration and extent of recovery along risks of surgery that include, but is not limited to bleeding, infection, blood vessel injury or damage, loss of sensation, loss of bladder, bowel or sexual function, nerve injury/damage resulting in weakness/paralysis, malunion, nonunion, CSF leak, brachial plexus injury, peripheral vision blindness, failure of implants/fusion, failure to relieve symptoms, recurrent disease, adjacent segment disease, need to reoperate for any reason and general anesthesia reaction such as stroke, coma, heart attack, delirium, confusion, death as well as worsening of preexisted medical conditions.     I clearly emphasized that while the goal of surgery is  to decompress the spinal cord so as to ARREST the progression of neurological deficits - preexisting deficits may or may not improve after surgery. We discussed that many patients do clinically improve in functional and neurological outcomes following decompression of the spinal elements in patients but the extent of which is variable and depends on the severity of pain, numbness, tingling, or weakness. With improvement seen of those symptoms in that order. We did discuss the goal of surgery to alleviate pain first and foremost and hope for recovery of all neurologic function with time.     All questions were answered and the patient was amenable to proceed.     INDICATIONS FOR THE PROCEDURE: Lanie Pablo is an 70 y.o. female who is having surgery for Chronic bilateral low back pain with bilateral sciatica [M54.42, M54.41, G89.29]. 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     DESCRIPTION OF THE OPERATION:   The patient was brought to the operating room theater. A verbal huddle was performed confirming the patient by name, date of birth, medical record number, site of surgery. After all team members were in agreement, they underwent anesthesia induction without complication. Two large bore IVs were placed as well as endotracheal tube. Perioperative antibiotic administration was confirmed. The patient was flipped prone onto a amy table with a nahum frame and their back was prepped and draped in a sterile fashion. Using lateral fluoroscopy we confirmed our levels of interest with a spinal needle and an incision was marked out in the midline.    The skin was then infiltrated with local anesthetic and we then began the procedure by opening the skin with a 10 blade and using combination of Bovie electrocautery and blunt dissection we exposed the spinous process, pars, lamina, and joints. We then used lateral fluoroscopy again to confirm our levels of interest  T10-Pelvis after total exposure and a self-retaining retractor was placed into the incision. We were able to then remove the prior hardware. The screws from L4 caudally to T10 were all very tight and no loosening was present. There was pseudoarthrosis at L4-5 and L5-S1 levels and no signs of bony fusion. There was fractured rods under the L5 screw on the right and between the L4 and L5 screw on the left. We carefully removed the S2AI and S1 screw on the left. The right S1 screw was fractured from prior surgeries and retained. Next we turned our attention the instrumentation portion of the procedure. A clamp was attached to the spinous process at the rostral edge of the incision and the O arm was brought into the field. An intraoperative CT scan was performed and merged the patient in three dimensional space. We then use navigated drill and taps to cannulate the pedicles from T10 to the S2 pedicle with a 5.5mm tap. We then placed screws at the left S1 without complication. The screw diameters were 9.5x45mm for this screw. We then placed 8.5x80mm screws at S2AI for pelvic fixation. We then performed a SI joint fusion across both sides given the amanda fractures and severe pain she was in to give better fixation under the amanda fractures. We placed two 10.5x90mm granite screws bilaterally to give dual pelvic fixation and fuse the SI joints bilaterally. All screws were placed without complication.     We then completed our segmental instrumentation with rods and locking caps were placed through our screw tops and final tightened to  specifications. We then performed posterolateral arthrodesis decorticating the remaining joints and transverse processes from L4, L5, and S1. We then packed bone morphogenic protein and allograft demineralized bone matrix bone graft into the gutters for fusion. We elected to use BMP given the extensive revision and complexity of he amanda fractures and nonunion present. We did compress  and restore further lordosis using the bed and manually with equipment at each level. We used two rods in total to reinforce the construct.    Three 10 fr round drains were tunneled in a subfascial fashion. AP + Lateral x-rays was performed confirming accurate placement of all of our hardware. Next we copiously irrigated the incision and began our closure, the muscle and fascia were approximated with 0 vicryl and 2-0 vicryl sutures and the skin was approximated with staples. A prevena was placed on the skin to assist with wound healing. The patient returned to anesthesias care and was extubated and returned to the PACU in stable condition.    Disposition: PACU - hemodynamically stable.    Condition: stable    Attending Attestation: I was present and scrubbed for the key portions of the procedure.      Mike Quigley MD, [facility]  Spine , [facility]  Darell Delcid and Myah Delcid Chair in Spinal Neurosurgery  Neurosurgery , [facility]  Complex Spine Surgery Fellowship Director   of Neurological Surgery  [employer]  Office: [phone]  Fax: [phone]

## 2025-07-01 NOTE — ANESTHESIA PROCEDURE NOTES
Peripheral IV  Date/Time: 7/1/2025 8:15 AM  Inserted by: Neva Verdugo MD    Placement  Needle size: 14 G  Laterality: left  Location: hand  Local anesthetic: none  Site prep: alcohol  Technique: anatomical landmarks  Attempts: 1

## 2025-07-01 NOTE — ANESTHESIA POSTPROCEDURE EVALUATION
Patient: Lanie Pablo    Procedure Summary       Date: 07/01/25 Room / Location: [facility] OR 24 / Virtual [facility] OR    Anesthesia Start: 0726 Anesthesia Stop:     Procedure: Exploration of Prior T10-Pelvis Fusion with revision of amanda fracture at L3-L4 and S1 Fusion bilaterally as well as revision posterolateral arthrodesis L5-S1 Diagnosis:       Chronic bilateral low back pain with bilateral sciatica      (Chronic bilateral low back pain with bilateral sciatica [M54.42, M54.41, G89.29])    Surgeons: Mike Quigley MD Responsible Provider: Neva Verdugo MD    Anesthesia Type: general ASA Status: 2            Anesthesia Type: general    Vitals Value Taken Time   /60 07/01/25 13:49   Temp 36.1 C 07/01/25 13:49   Pulse 82 07/01/25 13:49   Resp 12 07/01/25 13:49   SpO2 100% 07/01/25 13:49       Anesthesia Post Evaluation    Patient location during evaluation: PACU  Patient participation: complete - patient participated  Level of consciousness: sleepy but conscious  Pain score: 0  Pain management: adequate  Airway patency: patent  Cardiovascular status: acceptable  Respiratory status: acceptable  Hydration status: acceptable  Postoperative Nausea and Vomiting: none        No notable events documented.

## 2025-07-02 ENCOUNTER — APPOINTMENT (OUTPATIENT)
Dept: RADIOLOGY | Facility: HOSPITAL | Age: 70
DRG: 448 | End: 2025-07-02
Payer: MEDICARE

## 2025-07-02 LAB
ANION GAP SERPL CALC-SCNC: 10 MMOL/L (ref 10–20)
BUN SERPL-MCNC: 13 MG/DL (ref 6–23)
CALCIUM SERPL-MCNC: 8.1 MG/DL (ref 8.6–10.6)
CHLORIDE SERPL-SCNC: 108 MMOL/L (ref 98–107)
CO2 SERPL-SCNC: 26 MMOL/L (ref 21–32)
CREAT SERPL-MCNC: 0.93 MG/DL (ref 0.5–1.05)
EGFRCR SERPLBLD CKD-EPI 2021: 65 ML/MIN/1.73M*2
ERYTHROCYTE [DISTWIDTH] IN BLOOD BY AUTOMATED COUNT: 13.6 % (ref 11.5–14.5)
ERYTHROCYTE [DISTWIDTH] IN BLOOD BY AUTOMATED COUNT: 13.6 % (ref 11.5–14.5)
GLUCOSE SERPL-MCNC: 107 MG/DL (ref 74–99)
HCT VFR BLD AUTO: 28.9 % (ref 36–46)
HCT VFR BLD AUTO: 29.8 % (ref 36–46)
HGB BLD-MCNC: 8.9 G/DL (ref 12–16)
HGB BLD-MCNC: 8.9 G/DL (ref 12–16)
MCH RBC QN AUTO: 28.3 PG (ref 26–34)
MCH RBC QN AUTO: 29 PG (ref 26–34)
MCHC RBC AUTO-ENTMCNC: 29.9 G/DL (ref 32–36)
MCHC RBC AUTO-ENTMCNC: 30.8 G/DL (ref 32–36)
MCV RBC AUTO: 94 FL (ref 80–100)
MCV RBC AUTO: 95 FL (ref 80–100)
NRBC BLD-RTO: 0 /100 WBCS (ref 0–0)
NRBC BLD-RTO: 0 /100 WBCS (ref 0–0)
PLATELET # BLD AUTO: 152 X10*3/UL (ref 150–450)
PLATELET # BLD AUTO: 179 X10*3/UL (ref 150–450)
POTASSIUM SERPL-SCNC: 4.3 MMOL/L (ref 3.5–5.3)
RBC # BLD AUTO: 3.07 X10*6/UL (ref 4–5.2)
RBC # BLD AUTO: 3.15 X10*6/UL (ref 4–5.2)
SODIUM SERPL-SCNC: 140 MMOL/L (ref 136–145)
WBC # BLD AUTO: 9 X10*3/UL (ref 4.4–11.3)
WBC # BLD AUTO: 9.1 X10*3/UL (ref 4.4–11.3)

## 2025-07-02 PROCEDURE — 82374 ASSAY BLOOD CARBON DIOXIDE: CPT | Performed by: STUDENT IN AN ORGANIZED HEALTH CARE EDUCATION/TRAINING PROGRAM

## 2025-07-02 PROCEDURE — 85027 COMPLETE CBC AUTOMATED: CPT | Performed by: STUDENT IN AN ORGANIZED HEALTH CARE EDUCATION/TRAINING PROGRAM

## 2025-07-02 PROCEDURE — 2500000004 HC RX 250 GENERAL PHARMACY W/ HCPCS (ALT 636 FOR OP/ED): Performed by: PHYSICIAN ASSISTANT

## 2025-07-02 PROCEDURE — 2500000001 HC RX 250 WO HCPCS SELF ADMINISTERED DRUGS (ALT 637 FOR MEDICARE OP): Performed by: PHYSICIAN ASSISTANT

## 2025-07-02 PROCEDURE — 2500000004 HC RX 250 GENERAL PHARMACY W/ HCPCS (ALT 636 FOR OP/ED): Mod: JW

## 2025-07-02 PROCEDURE — 1200000002 HC GENERAL ROOM WITH TELEMETRY DAILY

## 2025-07-02 PROCEDURE — 97161 PT EVAL LOW COMPLEX 20 MIN: CPT | Mod: GP

## 2025-07-02 PROCEDURE — 72082 X-RAY EXAM ENTIRE SPI 2/3 VW: CPT

## 2025-07-02 PROCEDURE — 85027 COMPLETE CBC AUTOMATED: CPT | Performed by: PHYSICIAN ASSISTANT

## 2025-07-02 PROCEDURE — 2500000004 HC RX 250 GENERAL PHARMACY W/ HCPCS (ALT 636 FOR OP/ED): Performed by: STUDENT IN AN ORGANIZED HEALTH CARE EDUCATION/TRAINING PROGRAM

## 2025-07-02 PROCEDURE — 36415 COLL VENOUS BLD VENIPUNCTURE: CPT | Performed by: STUDENT IN AN ORGANIZED HEALTH CARE EDUCATION/TRAINING PROGRAM

## 2025-07-02 PROCEDURE — 97530 THERAPEUTIC ACTIVITIES: CPT | Mod: GP

## 2025-07-02 PROCEDURE — 72082 X-RAY EXAM ENTIRE SPI 2/3 VW: CPT | Performed by: RADIOLOGY

## 2025-07-02 PROCEDURE — 36415 COLL VENOUS BLD VENIPUNCTURE: CPT | Performed by: PHYSICIAN ASSISTANT

## 2025-07-02 PROCEDURE — 2500000001 HC RX 250 WO HCPCS SELF ADMINISTERED DRUGS (ALT 637 FOR MEDICARE OP): Performed by: STUDENT IN AN ORGANIZED HEALTH CARE EDUCATION/TRAINING PROGRAM

## 2025-07-02 RX ORDER — DICLOFENAC SODIUM 75 MG/1
75 TABLET, DELAYED RELEASE ORAL 2 TIMES DAILY
Start: 2025-07-02

## 2025-07-02 RX ORDER — HEPARIN SODIUM 5000 [USP'U]/ML
5000 INJECTION, SOLUTION INTRAVENOUS; SUBCUTANEOUS EVERY 8 HOURS
Start: 2025-07-03

## 2025-07-02 RX ORDER — OXYCODONE HYDROCHLORIDE 5 MG/1
5 TABLET ORAL EVERY 6 HOURS PRN
Start: 2025-07-02

## 2025-07-02 RX ORDER — ACETAMINOPHEN 325 MG/1
650 TABLET ORAL EVERY 6 HOURS
Start: 2025-07-02

## 2025-07-02 RX ORDER — AMOXICILLIN 250 MG
2 CAPSULE ORAL 2 TIMES DAILY
Start: 2025-07-02

## 2025-07-02 RX ORDER — ONDANSETRON 4 MG/1
4 TABLET, FILM COATED ORAL EVERY 8 HOURS PRN
Start: 2025-07-02

## 2025-07-02 RX ORDER — POLYETHYLENE GLYCOL 3350 17 G/17G
17 POWDER, FOR SOLUTION ORAL DAILY
Start: 2025-07-03

## 2025-07-02 RX ADMIN — OXYCODONE HYDROCHLORIDE 10 MG: 10 TABLET ORAL at 13:29

## 2025-07-02 RX ADMIN — VITAM B12 500 MCG: 100 TAB at 06:59

## 2025-07-02 RX ADMIN — CITALOPRAM HYDROBROMIDE 40 MG: 40 TABLET ORAL at 09:27

## 2025-07-02 RX ADMIN — ACETAMINOPHEN 650 MG: 325 TABLET, FILM COATED ORAL at 13:29

## 2025-07-02 RX ADMIN — SODIUM CHLORIDE 100 ML/HR: 0.9 INJECTION, SOLUTION INTRAVENOUS at 00:26

## 2025-07-02 RX ADMIN — OXYCODONE HYDROCHLORIDE 10 MG: 10 TABLET ORAL at 03:35

## 2025-07-02 RX ADMIN — SODIUM CHLORIDE, SODIUM LACTATE, POTASSIUM CHLORIDE, AND CALCIUM CHLORIDE 500 ML: .6; .31; .03; .02 INJECTION, SOLUTION INTRAVENOUS at 08:12

## 2025-07-02 RX ADMIN — OXYCODONE HYDROCHLORIDE 10 MG: 10 TABLET ORAL at 09:27

## 2025-07-02 RX ADMIN — HEPARIN SODIUM 5000 UNITS: 5000 INJECTION, SOLUTION INTRAVENOUS; SUBCUTANEOUS at 09:33

## 2025-07-02 RX ADMIN — PREGABALIN 100 MG: 50 CAPSULE ORAL at 11:38

## 2025-07-02 RX ADMIN — CYCLOBENZAPRINE 10 MG: 10 TABLET, FILM COATED ORAL at 14:44

## 2025-07-02 RX ADMIN — ACETAMINOPHEN 650 MG: 325 TABLET, FILM COATED ORAL at 20:06

## 2025-07-02 RX ADMIN — HYDROMORPHONE HYDROCHLORIDE 0.4 MG: 1 INJECTION, SOLUTION INTRAMUSCULAR; INTRAVENOUS; SUBCUTANEOUS at 10:30

## 2025-07-02 RX ADMIN — HEPARIN SODIUM 5000 UNITS: 5000 INJECTION, SOLUTION INTRAVENOUS; SUBCUTANEOUS at 16:47

## 2025-07-02 RX ADMIN — PANTOPRAZOLE SODIUM 40 MG: 40 TABLET, DELAYED RELEASE ORAL at 06:59

## 2025-07-02 RX ADMIN — CYCLOBENZAPRINE 10 MG: 10 TABLET, FILM COATED ORAL at 09:27

## 2025-07-02 RX ADMIN — HYDROMORPHONE HYDROCHLORIDE 0.4 MG: 1 INJECTION, SOLUTION INTRAMUSCULAR; INTRAVENOUS; SUBCUTANEOUS at 16:52

## 2025-07-02 RX ADMIN — ACETAMINOPHEN 650 MG: 325 TABLET, FILM COATED ORAL at 06:59

## 2025-07-02 RX ADMIN — FLECAINIDE ACETATE 50 MG: 50 TABLET ORAL at 20:07

## 2025-07-02 RX ADMIN — CYCLOBENZAPRINE 10 MG: 10 TABLET, FILM COATED ORAL at 20:06

## 2025-07-02 RX ADMIN — Medication 25 MCG: at 09:27

## 2025-07-02 RX ADMIN — MAGNESIUM OXIDE TAB 400 MG (241.3 MG ELEMENTAL MG) 1 TABLET: 400 (241.3 MG) TAB at 09:27

## 2025-07-02 RX ADMIN — SENNOSIDES AND DOCUSATE SODIUM 2 TABLET: 50; 8.6 TABLET ORAL at 20:06

## 2025-07-02 RX ADMIN — FLECAINIDE ACETATE 50 MG: 50 TABLET ORAL at 09:28

## 2025-07-02 RX ADMIN — SODIUM CHLORIDE 1000 ML: 0.9 INJECTION, SOLUTION INTRAVENOUS at 14:44

## 2025-07-02 RX ADMIN — SENNOSIDES AND DOCUSATE SODIUM 2 TABLET: 50; 8.6 TABLET ORAL at 09:27

## 2025-07-02 ASSESSMENT — COGNITIVE AND FUNCTIONAL STATUS - GENERAL
MOBILITY SCORE: 17
MOBILITY SCORE: 18
STANDING UP FROM CHAIR USING ARMS: A LITTLE
DAILY ACTIVITIY SCORE: 19
WALKING IN HOSPITAL ROOM: A LITTLE
PERSONAL GROOMING: A LITTLE
CLIMB 3 TO 5 STEPS WITH RAILING: A LOT
TURNING FROM BACK TO SIDE WHILE IN FLAT BAD: A LITTLE
WALKING IN HOSPITAL ROOM: A LITTLE
DRESSING REGULAR UPPER BODY CLOTHING: A LITTLE
MOVING FROM LYING ON BACK TO SITTING ON SIDE OF FLAT BED WITH BEDRAILS: A LITTLE
CLIMB 3 TO 5 STEPS WITH RAILING: A LITTLE
HELP NEEDED FOR BATHING: A LITTLE
TURNING FROM BACK TO SIDE WHILE IN FLAT BAD: A LITTLE
MOVING TO AND FROM BED TO CHAIR: A LITTLE
DRESSING REGULAR LOWER BODY CLOTHING: A LITTLE
MOVING FROM LYING ON BACK TO SITTING ON SIDE OF FLAT BED WITH BEDRAILS: A LITTLE
MOVING TO AND FROM BED TO CHAIR: A LITTLE
STANDING UP FROM CHAIR USING ARMS: A LITTLE
TOILETING: A LITTLE

## 2025-07-02 ASSESSMENT — PAIN SCALES - GENERAL
PAINLEVEL_OUTOF10: 3
PAINLEVEL_OUTOF10: 3
PAINLEVEL_OUTOF10: 0 - NO PAIN
PAINLEVEL_OUTOF10: 10 - WORST POSSIBLE PAIN

## 2025-07-02 ASSESSMENT — PAIN - FUNCTIONAL ASSESSMENT
PAIN_FUNCTIONAL_ASSESSMENT: 0-10

## 2025-07-02 ASSESSMENT — PAIN DESCRIPTION - LOCATION: LOCATION: BACK

## 2025-07-02 ASSESSMENT — ACTIVITIES OF DAILY LIVING (ADL): LACK_OF_TRANSPORTATION: NO

## 2025-07-02 NOTE — PROGRESS NOTES
Occupational Therapy                 Therapy Communication Note    Patient Name: Lanie Pablo  MRN: [id number]  Department: [place] [address]  Room: 202/202-A  Today's Date: 7/2/2025     Discipline: Occupational Therapy    Missed Visit: OT Missed Visit: Yes     Missed Visit Reason: Missed Visit Reason:  (off division, at xray, will re-attempt as pt.schedule allows)    Missed Time: Attempt 1013    07/02/25 at 2:05 PM   Kathy Diane OT   Rehab Office: [phone]

## 2025-07-02 NOTE — HOSPITAL COURSE
Lanie Pablo is a 70 y.o. female with a past medical history includes depression, HLD, artial fibrillation, orthostatic hypotension, vasovagal syncope, CHF, Asthma, CKD3,  Iron deficiency anemia, B12 deficiency, GERD, s/p gastric sleeve 2005, bowel incontinence, Chronic bilateral low back pain with bilateral sciatica, DDD and OA,. The patient is currently scheduled for Exploration of Prior T10-Pelvis Fusion with revision of amanda fracture at L3-L4 and S1 Fusion bilaterally as well as revision posterolateral arthrodesis L5-S1  on 07/01/2025  secondary to Chronic bilateral low back pain with bilateral sciatica.     7/2-Postop xray hardware intact.   PT/OT evaluated patient and recommended low intensity     On the day of discharge, the patient was seen and evaluated by the neurosurgery team and deemed suitable for discharge. The patient was given detailed discharge instructions and were scheduled to follow up as an outpatient.

## 2025-07-02 NOTE — CARE PLAN
Transitional Care Coordinator Note: Patient discussed with medical team, per medical team patient is not medically ready.   Discharge dispo: [facility] rehab is [facility]. Patient was their before, declined [facility] list.  ADOD Next week.  Radha Beltran RN  Transitional Care Coordinator      TCC sent Referral via [employer]. No Precert is needed.

## 2025-07-02 NOTE — PROGRESS NOTES
Met with pt and introduced myself as care coordinator with Care Transitions Team for discharge planning. Pt reports being independent with ADL's for the most part. Pt uses Walker for assistance with ambulation. Pt Resides with . Pt reported no safety concerns at home, she stated one falls in last year. Pt's address, phone number, and contact information was verified.      PCP: Tez  DATE OF LAST VISIT: 2 Months Ago  PHARMACY: [facility]  MEDICATIONS AFFORDABLE:Yes  FEELS SAFE AT HOME: Yes  RECENT FALLS: 1  EQUIPMENT USED IN HOME: Walker, Cane  HOME O2/CPAP/NEBS: n/a  DME SUPPLIER: Unknown  TRANSPORT HOME: Yes  DIABETIC/SUPPLIES NEEDED: n/a  HD SCHEDULE: n/a    Transitional Care Coordinator Note: Patient discussed with medical team, per medical team patient is not medically ready. Discharge dispo: [facility], But Patient would like to go to [facility]. TCC updated Patient regarding Therapy Notes. TCC will continue to follow for discharge Planning.  Radha Beltran RN  Transitional Care Coordinator

## 2025-07-02 NOTE — PROGRESS NOTES
Physical Therapy    Physical Therapy Evaluation & Treatment    Patient Name: Lanie Pablo  MRN: [id number]  Department: [facility]  Room: 202/202-A  Today's Date: 7/2/2025   Time Calculation  Start Time: 0935 (split session 352-1008)  Stop Time: 1626 (split session [id number])  Time Calculation (min): 40 min    Assessment/Plan   PT Assessment  PT Assessment Results: Impaired balance, Pain, Orthopedic restrictions, Impaired sensation  Rehab Prognosis: Excellent  Barriers to Discharge Home: No anticipated barriers  Evaluation/Treatment Tolerance: Patient tolerated treatment well  Medical Staff Made Aware: Yes  Strengths: Attitude of self, Premorbid level of function, Support of Caregivers, Housing layout  Barriers to Participation: Comorbidities  End of Session Communication: Bedside nurse  Assessment Comment: 69 yo female, typ Ind with no AD.  Pt s/p T/L surgery, currently limited by pain, orthopedic restrictions and balance deficits, requiring CGA to ambulate with FWW. Would benefit from cont PT while in hosp to address and facilitate return to PLOF.  End of Session Patient Position:  (WC, going to xray)   IP OR SWING BED PT PLAN  Inpatient or Swing Bed: Inpatient  PT Plan  Treatment/Interventions: Bed mobility, Transfer training, Gait training, Stair training, Balance training, Therapeutic exercise, Therapeutic activity  PT Plan: Ongoing PT  PT Frequency: Daily  PT Discharge Recommendations: High intensity level of continued care  PT Recommended Transfer Status: Assist x1, Assistive device  PT - OK to Discharge: Yes (Meaning pt has been evaluated and d/c recc is in place)    Subjective     PT Visit Info:  PT Received On: 07/02/25  General Visit Information:  General  Reason for Referral: S/p Exploration of Prior T10-Pelvis Fusion with revision of amanda fracture at L3-L4 and S1 Fusion  Past Medical History Relevant to Rehab: Depression, HLD, artial fibrillation, CHF, Asthma, CKD3,  Iron deficiency anemia, GERD, s/p  gastric sleeve 2005, Chronic bilateral low back pain with bilateral sciatica, DDD and OA  Family/Caregiver Present: No  Prior to Session Communication: Bedside nurse  Patient Position Received: Bed, 3 rail up, Alarm off, not on at start of session  General Comment: Supine.  Pt pleasant, cooperative and and agreeable to PT.  Had not mobilized yet.  Eager to ambulate.  Able to perform transfers and ambulate with FWW and CGA.  Leaving for xray in WC at end of first session. Pt returned in afternoon, pt on her way to bathroom.  Assisted pt to bathroom with RN and then back to bed.  Home Living:  Home Living  Type of Home: House  Lives With: Spouse (Retired, & 30 yo grandson)  Home Adaptive Equipment: Walker rolling or standard  Home Layout: One level  Home Access: Stairs to enter with rails  Entrance Stairs-Rails: Right  Entrance Stairs-Number of Steps: 4  Prior Level of Function:  Prior Function Per Pt/Caregiver Report  Level of Lake: Independent with ADLs and functional transfers  Ambulatory Assistance: Independent (community ambulator with no AD)  Precautions:  Precautions  Medical Precautions: Fall precautions  Post-Surgical Precautions: Spinal precautions  Precautions Comment: Issued and reviewed HOs for log roll and spinal precautions    Objective   Pain:  Pain Assessment  Pain Assessment: 0-10  0-10 (Numeric) Pain Score: 3  Pain Type: Surgical pain  Pain Location: Back  Pain Interventions: Ambulation/increased activity, Repositioned  Response to Interventions:  (Tolerated PT)  Cognition:  Cognition  Overall Cognitive Status: Within Functional Limits  Arousal/Alertness: Appropriate responses to stimuli  Orientation Level: Oriented X4  Following Commands: Follows all commands and directions without difficulty    General Assessments:    Activity Tolerance  Endurance: Endurance does not limit participation in activity    Sensation  Light Touch: Partial deficits in the RLE, Partial deficits in the LLE  (baseline)    Strength  Strength Comments: BLE WFL  Strength  Strength Comments: BLE WFL    Coordination  Movements are Fluid and Coordinated: Yes    Postural Control  Postural Control: Within Functional Limits    Static Sitting Balance  Static Sitting-Level of Assistance: Distant supervision  Dynamic Sitting Balance  Dynamic Sitting-Level of Assistance: Close supervision    Static Standing Balance  Static Standing-Level of Assistance: Close supervision (with FWW)  Dynamic Standing Balance  Dynamic Standing-Level of Assistance: Contact guard (with FWW)  Extremity/Trunk Assessments:    RLE   RLE :  (ROM WFL)  LLE   LLE :  (ROM WFL)  Treatments:  Therapeutic Exercise  Therapeutic Exercise Performed: Yes  Therapeutic Exercise Activity 1: Supine: AP 3x10, HS 2x10    Bed Mobility  Bed Mobility: Yes  Bed Mobility 1  Bed Mobility 1: Supine to sitting, Log roll  Level of Assistance 1: Minimum assistance, Minimal verbal cues  Bed Mobility Comments 1: Performed twice    Ambulation/Gait Training  Ambulation/Gait Training Performed: Yes  Ambulation/Gait Training 1  Surface 1: Level tile  Device 1: Rolling walker  Assistance 1: Contact guard  Quality of Gait 1: Decreased step length, Wide base of support, Soft knee(s)  Comments/Distance (ft) 1: 25 feet, 20 feet x2 with seated breaks  Transfers  Transfer: Yes  Transfer 1  Transfer From 1: Sit to, Stand to  Transfer to 1: Sit  Transfer Device 1: Walker  Transfer Level of Assistance 1: Contact guard, Minimal verbal cues  Transfers 2  Transfer From 2: Bed to  Transfer to 2: Chair with arms  Transfer Device 2: Walker  Transfer Level of Assistance 2: Contact guard, Minimal verbal cues  Transfers 3  Transfer From 3: Stand to, Toilet to  Transfer to 3: Stand  Transfer Device 3: Walker  Transfer Level of Assistance 3: Contact guard, Minimal verbal cues    Stairs  Stairs: No    Outcome Measures:    [facility] Basic Mobility  Turning from your back to your side while in a flat bed without using  bedrails: A little  Moving from lying on your back to sitting on the side of a flat bed without using bedrails: A little  Moving to and from bed to chair (including a wheelchair): A little  Standing up from a chair using your arms (e.g. wheelchair or bedside chair): A little  To walk in hospital room: A little  Climbing 3-5 steps with railing: A little  Basic Mobility - Total Score: 18    Encounter Problems       Encounter Problems (Active)       Mobility       Ambulate >150 feet, LRD, Ind  (Progressing)       Start:  07/02/25    Expected End:  07/16/25            Up/dn 4 stairs, Ind (Progressing)       Start:  07/02/25    Expected End:  07/16/25               PT Transfers       Pt able to perform bed mobility Ind from a flat bed without rail using Log Roll  (Progressing)       Start:  07/02/25    Expected End:  07/16/25            sit<>stand, bed<>chair, LRD, Ind  (Progressing)       Start:  07/02/25    Expected End:  07/16/25                   Education Documentation  No documentation found.  Education Comments  No comments found.

## 2025-07-02 NOTE — PROGRESS NOTES
Rapid Response Nurse Note: RADAR alert: 7    Pager time: 32  Arrival time: 32  Event end time: 50  Location: [facility]  [x] Triage by phone or secure messaging    Rapid response initiated by:  [] Rapid response RN [] Family [] Nursing Supervisor [] Physician   [x] RADAR auto page [] Sepsis auto-page [] RN [] RT   [] NP/PA [] Other:     Primary reason for call:   [] BAT [] New CPAP/BiPAP [] Bleeding [] Change in mental status   [] Chest pain [] Code blue [] FiO2 >/= 50% [] HR </= 40 bpm   [] HR >/= 130 bpm [] Hyperglycemia [] Hypoglycemia [x] RADAR    [] RR </= 8 bpm [] RR >/= 30 bpm [] SBP </= 90 mmHg [] SpO2 < 90%   [] Seizure [] Sepsis [] Shortness of breath  [] Staff concern: see comments     Initial VS and/or RADAR VS: T 36.2 °C; HR 60; RR 10; BP 94/50; SPO2 93%.    Providers present at bedside (if applicable): na    Name of ICU Provider contacted (if applicable): na    Interventions:  [x] None [] ABG/VBG [] Assist w/ICU transfer [] BAT paged    [] Bag mask [] Blood [] Cardioversion [] Code Blue   [] Code blue for intubation [] Code status changed [] Chest x-ray [] EKG   [] IV fluid/bolus [] KUB x-ray [] Labs/cultures [] Medication   [] Nebulizer treatment [] NIPPV (CPAP/BiPAP) [] Oxygen [] Oral airway   [] Peripheral IV [] Palliative care consult [] CT/MRI [] Sepsis protocol    [] Suctioned [] Other:     Outcome:  [] Coded and  [] Code blue for intubation [] Coded and transferred to ICU []  on division   [x] Remained on division (no change) [] Remained on division + additional monitoring [] Remained in ED [] Transferred to ED   [] Transferred to ICU [] Transferred to inpatient status [] Transferred for interventions (procedure) [] Transferred to ICU stepdown    [] Transferred to surgery [] Transferred to telemetry [] Sepsis protocol [] STEMI protocol   [] Stroke protocol [x] Bedside nurse instructed to page rapid response for any concerns or acute change in condition/VS     Additional Comments:  Reviewed above RADAR VS with bedside RN.  VS within patient's current trends.  Patient denied pain, shortness of breath, dizziness or lightheadedness.  No interventions by rapid response team indicated at this time.  Staff to page rapid response for any concerns or acute change in condition/VS.

## 2025-07-02 NOTE — PROGRESS NOTES
"    Department of Neurosurgery  Daily Progress Note    Patient Name: Lanie Pablo  MRN: [id number]  Date:  07/02/25     Subjective  No overnight events noted. Patient states lumbar and radicular leg pain improved postop. Patient does c/o incisional back pain. Patient denies N/T of lower extremities.     Objective    Vital Signs  BP (!) 86/49 (BP Location: Right arm, Patient Position: Lying)   Pulse 65   Temp 36.3 °C (97.3 °F) (Temporal)   Resp 14   Ht 1.626 m (5' 4\")   Wt 86.6 kg (190 lb 14.7 oz)   SpO2 98%   BMI 32.77 kg/m²      Physical Exam  Constitutional: A&Ox3, calm and cooperative, NAD.  Eyes: PERRL, EOMI.   ENMT: Moist mucous membranes, no apparent injuries or lesions.  Cardiovascular: Normal rate and regular rhythm. 2+ equal pulses of the distal extremities.  Respiratory/Thorax: CTAB, regular respirations on RA. Good symmetric chest expansion.   Gastrointestinal: Abdomen soft, non tender.   Genitourinary: Wild    Neurological:   A&Ox3  Face symmetric, Facial SILT   Tongue midline and symmetric  RUE D5 / B5 / T5 / HG 5/ IO 5  LUE D5 / B5 / T5 / HG 5/ IO 5  RLE HF5 / KE 5/ DF 5/ PF 5  LLE HF5 / KE 5/ DF 5/ PF 5  No clonus, neg Gutierrez    Psychological: Appropriate mood and behavior.   Skin: Warm and dry. Incision c/d/i    Diagnostics   Results for orders placed or performed during the hospital encounter of 07/01/25 (from the past 24 hours)   BLOOD GAS ARTERIAL FULL PANEL   Result Value Ref Range    POCT pH, Arterial 7.35 (L) 7.38 - 7.42 pH    POCT pCO2, Arterial 40 38 - 42 mm Hg    POCT pO2, Arterial 377 (H) 85 - 95 mm Hg    POCT SO2, Arterial 100 94 - 100 %    POCT Oxy Hemoglobin, Arterial 97.8 94.0 - 98.0 %    POCT Hematocrit Calculated, Arterial 38.0 36.0 - 46.0 %    POCT Sodium, Arterial 137 136 - 145 mmol/L    POCT Potassium, Arterial 3.8 3.5 - 5.3 mmol/L    POCT Chloride, Arterial 108 (H) 98 - 107 mmol/L    POCT Ionized Calcium, Arterial 1.12 1.10 - 1.33 mmol/L    POCT Glucose, Arterial 122 " (H) 74 - 99 mg/dL    POCT Lactate, Arterial 1.0 0.4 - 2.0 mmol/L    POCT Base Excess, Arterial -3.3 (L) -2.0 - 3.0 mmol/L    POCT HCO3 Calculated, Arterial 22.1 22.0 - 26.0 mmol/L    POCT Hemoglobin, Arterial 12.8 12.0 - 16.0 g/dL    POCT Anion Gap, Arterial 11 10 - 25 mmo/L    Patient Temperature 37.0 degrees Celsius    FiO2 90 %   BLOOD GAS ARTERIAL FULL PANEL   Result Value Ref Range    POCT pH, Arterial 7.33 (L) 7.38 - 7.42 pH    POCT pCO2, Arterial 42 38 - 42 mm Hg    POCT pO2, Arterial 188 (H) 85 - 95 mm Hg    POCT SO2, Arterial 100 94 - 100 %    POCT Oxy Hemoglobin, Arterial 97.8 94.0 - 98.0 %    POCT Hematocrit Calculated, Arterial 35.0 (L) 36.0 - 46.0 %    POCT Sodium, Arterial 136 136 - 145 mmol/L    POCT Potassium, Arterial 3.9 3.5 - 5.3 mmol/L    POCT Chloride, Arterial 105 98 - 107 mmol/L    POCT Ionized Calcium, Arterial 1.15 1.10 - 1.33 mmol/L    POCT Glucose, Arterial 118 (H) 74 - 99 mg/dL    POCT Lactate, Arterial 0.8 0.4 - 2.0 mmol/L    POCT Base Excess, Arterial -3.7 (L) -2.0 - 3.0 mmol/L    POCT HCO3 Calculated, Arterial 22.1 22.0 - 26.0 mmol/L    POCT Hemoglobin, Arterial 11.8 (L) 12.0 - 16.0 g/dL    POCT Anion Gap, Arterial 13 10 - 25 mmo/L    Patient Temperature 37.0 degrees Celsius    FiO2 37 %   BLOOD GAS ARTERIAL FULL PANEL   Result Value Ref Range    POCT pH, Arterial 7.26 (L) 7.38 - 7.42 pH    POCT pCO2, Arterial 50 (H) 38 - 42 mm Hg    POCT pO2, Arterial 159 (H) 85 - 95 mm Hg    POCT SO2, Arterial 99 94 - 100 %    POCT Oxy Hemoglobin, Arterial 97.3 94.0 - 98.0 %    POCT Hematocrit Calculated, Arterial 31.0 (L) 36.0 - 46.0 %    POCT Sodium, Arterial 135 (L) 136 - 145 mmol/L    POCT Potassium, Arterial 4.3 3.5 - 5.3 mmol/L    POCT Chloride, Arterial 107 98 - 107 mmol/L    POCT Ionized Calcium, Arterial 1.14 1.10 - 1.33 mmol/L    POCT Glucose, Arterial 143 (H) 74 - 99 mg/dL    POCT Lactate, Arterial 0.9 0.4 - 2.0 mmol/L    POCT Base Excess, Arterial -4.7 (L) -2.0 - 3.0 mmol/L    POCT HCO3  Calculated, Arterial 22.4 22.0 - 26.0 mmol/L    POCT Hemoglobin, Arterial 10.3 (L) 12.0 - 16.0 g/dL    POCT Anion Gap, Arterial 10 10 - 25 mmo/L    Patient Temperature 37.0 degrees Celsius    FiO2 35 %       Assessment/Plan  Lanie Pablo is a 70 y.o. female with a past medical history includes depression, HLD, artial fibrillation, orthostatic hypotension, vasovagal syncope, CHF, Asthma, CKD3,  Iron deficiency anemia, B12 deficiency, GERD, s/p gastric sleeve 2005, bowel incontinence, Chronic bilateral low back pain with bilateral sciatica, DDD and OA,. The patient is currently scheduled for Exploration of Prior T10-Pelvis Fusion with revision of amanda fracture at L3-L4 and S1 Fusion bilaterally as well as revision posterolateral arthrodesis L5-S1  on 07/01/2025  secondary to Chronic bilateral low back pain with bilateral sciatica.     # S/p Exploration of Prior T10-Pelvis Fusion with revision of amanda fracture at L3-L4 and S1 Fusion   - EOS xrays this AM  - Monitor VS/pulse ox [id number]   - Monitor AM CBC/RFP  -- Continue drain care per nursing       · Empty and record output at least every 8 hours       · Strip drains TID and PRN to avoid drain obstruction      # Acute postoperative pain  - Well controlled per patient, continue current regimen       ·  Tylenol 650mg PO [id number]        ·  Oxycodone 2.5/5/10mg PO [id number] PRN mild/mod/severe pain        ·  Dilaudid 0.2mg IV [id number] PRN breakthrough pain   - Bowel regimen with Colace 100 mg PO BID while using narcotics  - Zofran PRN nausea due to narcotics   - Pain assessments [id number]  -ASHLEY dorsey (void trial)     # Hx of atrial fibrillation  - Continue home Flecainide 50mg BID     Prophylaxis:   - DVT: SQ heparin [id number], SCDs, encourage ambulation   - Encourage IS x10 every hour while awake     FEN/GI:  - Monitor/replete electrolytes PRN   - IVF discontinue when PO intake adequate   - Continue Regular diet   - GI protection with Protonix 40mg daily   - Bowel regimen:  Scheduled Miralax and pericolace daily        Disposition: PT/OT to see and provide recs for discharge. Follow up appointment with neurosurgery on 7/21/2025.     Patient and plan discussed with chief neurosurgery resident, Dr. Boyd, and Dr. Mike Arango, CNP   Neurologic Surgery   Power  Team Pager #[phone]     Total face to face time spent with patient/family of > 60 minutes, with >50% of the time spent discussing plan of care/management, counseling/educating on disease processes, explaining results of diagnostic testing.

## 2025-07-03 LAB
ANION GAP SERPL CALC-SCNC: 8 MMOL/L (ref 10–20)
BUN SERPL-MCNC: 18 MG/DL (ref 6–23)
CALCIUM SERPL-MCNC: 8.5 MG/DL (ref 8.6–10.6)
CHLORIDE SERPL-SCNC: 109 MMOL/L (ref 98–107)
CO2 SERPL-SCNC: 27 MMOL/L (ref 21–32)
CREAT SERPL-MCNC: 1.2 MG/DL (ref 0.5–1.05)
EGFRCR SERPLBLD CKD-EPI 2021: 48 ML/MIN/1.73M*2
ERYTHROCYTE [DISTWIDTH] IN BLOOD BY AUTOMATED COUNT: 14 % (ref 11.5–14.5)
GLUCOSE SERPL-MCNC: 91 MG/DL (ref 74–99)
HCT VFR BLD AUTO: 29 % (ref 36–46)
HGB BLD-MCNC: 8.6 G/DL (ref 12–16)
MCH RBC QN AUTO: 28.7 PG (ref 26–34)
MCHC RBC AUTO-ENTMCNC: 29.7 G/DL (ref 32–36)
MCV RBC AUTO: 97 FL (ref 80–100)
NRBC BLD-RTO: 0 /100 WBCS (ref 0–0)
PLATELET # BLD AUTO: 153 X10*3/UL (ref 150–450)
POTASSIUM SERPL-SCNC: 4.1 MMOL/L (ref 3.5–5.3)
RBC # BLD AUTO: 3 X10*6/UL (ref 4–5.2)
SODIUM SERPL-SCNC: 140 MMOL/L (ref 136–145)
WBC # BLD AUTO: 8 X10*3/UL (ref 4.4–11.3)

## 2025-07-03 PROCEDURE — 2500000001 HC RX 250 WO HCPCS SELF ADMINISTERED DRUGS (ALT 637 FOR MEDICARE OP): Performed by: STUDENT IN AN ORGANIZED HEALTH CARE EDUCATION/TRAINING PROGRAM

## 2025-07-03 PROCEDURE — 85027 COMPLETE CBC AUTOMATED: CPT | Performed by: STUDENT IN AN ORGANIZED HEALTH CARE EDUCATION/TRAINING PROGRAM

## 2025-07-03 PROCEDURE — 97165 OT EVAL LOW COMPLEX 30 MIN: CPT | Mod: GO

## 2025-07-03 PROCEDURE — 36415 COLL VENOUS BLD VENIPUNCTURE: CPT | Performed by: STUDENT IN AN ORGANIZED HEALTH CARE EDUCATION/TRAINING PROGRAM

## 2025-07-03 PROCEDURE — 2500000002 HC RX 250 W HCPCS SELF ADMINISTERED DRUGS (ALT 637 FOR MEDICARE OP, ALT 636 FOR OP/ED): Performed by: STUDENT IN AN ORGANIZED HEALTH CARE EDUCATION/TRAINING PROGRAM

## 2025-07-03 PROCEDURE — 1200000002 HC GENERAL ROOM WITH TELEMETRY DAILY

## 2025-07-03 PROCEDURE — 2500000004 HC RX 250 GENERAL PHARMACY W/ HCPCS (ALT 636 FOR OP/ED): Performed by: PHYSICIAN ASSISTANT

## 2025-07-03 PROCEDURE — 82565 ASSAY OF CREATININE: CPT | Performed by: STUDENT IN AN ORGANIZED HEALTH CARE EDUCATION/TRAINING PROGRAM

## 2025-07-03 PROCEDURE — 2500000004 HC RX 250 GENERAL PHARMACY W/ HCPCS (ALT 636 FOR OP/ED)

## 2025-07-03 PROCEDURE — 2500000004 HC RX 250 GENERAL PHARMACY W/ HCPCS (ALT 636 FOR OP/ED): Performed by: STUDENT IN AN ORGANIZED HEALTH CARE EDUCATION/TRAINING PROGRAM

## 2025-07-03 PROCEDURE — 2500000001 HC RX 250 WO HCPCS SELF ADMINISTERED DRUGS (ALT 637 FOR MEDICARE OP): Performed by: PHYSICIAN ASSISTANT

## 2025-07-03 RX ADMIN — SENNOSIDES AND DOCUSATE SODIUM 2 TABLET: 50; 8.6 TABLET ORAL at 09:11

## 2025-07-03 RX ADMIN — OXYCODONE HYDROCHLORIDE 10 MG: 10 TABLET ORAL at 12:36

## 2025-07-03 RX ADMIN — ACETAMINOPHEN 650 MG: 325 TABLET, FILM COATED ORAL at 05:22

## 2025-07-03 RX ADMIN — FLECAINIDE ACETATE 50 MG: 50 TABLET ORAL at 22:32

## 2025-07-03 RX ADMIN — ACETAMINOPHEN 650 MG: 325 TABLET, FILM COATED ORAL at 16:45

## 2025-07-03 RX ADMIN — OXYCODONE HYDROCHLORIDE 5 MG: 5 TABLET ORAL at 20:22

## 2025-07-03 RX ADMIN — CYCLOBENZAPRINE 10 MG: 10 TABLET, FILM COATED ORAL at 09:11

## 2025-07-03 RX ADMIN — POLYETHYLENE GLYCOL 3350 17 G: 17 POWDER, FOR SOLUTION ORAL at 09:12

## 2025-07-03 RX ADMIN — Medication 25 MCG: at 09:11

## 2025-07-03 RX ADMIN — BENZOCAINE AND MENTHOL 1 LOZENGE: 15; 3.6 LOZENGE ORAL at 09:04

## 2025-07-03 RX ADMIN — ACETAMINOPHEN 650 MG: 325 TABLET, FILM COATED ORAL at 11:00

## 2025-07-03 RX ADMIN — HEPARIN SODIUM 5000 UNITS: 5000 INJECTION, SOLUTION INTRAVENOUS; SUBCUTANEOUS at 09:10

## 2025-07-03 RX ADMIN — VITAM B12 500 MCG: 100 TAB at 09:04

## 2025-07-03 RX ADMIN — PANTOPRAZOLE SODIUM 40 MG: 40 TABLET, DELAYED RELEASE ORAL at 05:33

## 2025-07-03 RX ADMIN — PROMETHAZINE HYDROCHLORIDE 25 MG: 25 TABLET ORAL at 09:04

## 2025-07-03 RX ADMIN — HYDROMORPHONE HYDROCHLORIDE 0.4 MG: 1 INJECTION, SOLUTION INTRAMUSCULAR; INTRAVENOUS; SUBCUTANEOUS at 09:58

## 2025-07-03 RX ADMIN — VITAM B12 500 MCG: 100 TAB at 05:33

## 2025-07-03 RX ADMIN — FLECAINIDE ACETATE 50 MG: 50 TABLET ORAL at 09:05

## 2025-07-03 RX ADMIN — OXYCODONE HYDROCHLORIDE 10 MG: 10 TABLET ORAL at 05:36

## 2025-07-03 RX ADMIN — CYCLOBENZAPRINE 10 MG: 10 TABLET, FILM COATED ORAL at 22:32

## 2025-07-03 RX ADMIN — MAGNESIUM OXIDE TAB 400 MG (241.3 MG ELEMENTAL MG) 1 TABLET: 400 (241.3 MG) TAB at 09:11

## 2025-07-03 RX ADMIN — CITALOPRAM HYDROBROMIDE 40 MG: 40 TABLET ORAL at 09:04

## 2025-07-03 RX ADMIN — HEPARIN SODIUM 5000 UNITS: 5000 INJECTION, SOLUTION INTRAVENOUS; SUBCUTANEOUS at 00:56

## 2025-07-03 RX ADMIN — CYCLOBENZAPRINE 10 MG: 10 TABLET, FILM COATED ORAL at 16:45

## 2025-07-03 RX ADMIN — SODIUM CHLORIDE, SODIUM LACTATE, POTASSIUM CHLORIDE, AND CALCIUM CHLORIDE 500 ML: .6; .31; .03; .02 INJECTION, SOLUTION INTRAVENOUS at 18:24

## 2025-07-03 RX ADMIN — HYDROMORPHONE HYDROCHLORIDE 0.4 MG: 1 INJECTION, SOLUTION INTRAMUSCULAR; INTRAVENOUS; SUBCUTANEOUS at 22:33

## 2025-07-03 RX ADMIN — HEPARIN SODIUM 5000 UNITS: 5000 INJECTION, SOLUTION INTRAVENOUS; SUBCUTANEOUS at 16:45

## 2025-07-03 ASSESSMENT — PAIN SCALES - GENERAL
PAINLEVEL_OUTOF10: 8
PAINLEVEL_OUTOF10: 0 - NO PAIN
PAINLEVEL_OUTOF10: 5 - MODERATE PAIN
PAINLEVEL_OUTOF10: 7
PAINLEVEL_OUTOF10: 9
PAINLEVEL_OUTOF10: 7
PAINLEVEL_OUTOF10: 4

## 2025-07-03 ASSESSMENT — COGNITIVE AND FUNCTIONAL STATUS - GENERAL
DRESSING REGULAR LOWER BODY CLOTHING: A LITTLE
PERSONAL GROOMING: A LITTLE
MOBILITY SCORE: 17
PERSONAL GROOMING: A LITTLE
TOILETING: A LITTLE
STANDING UP FROM CHAIR USING ARMS: A LITTLE
DAILY ACTIVITIY SCORE: 19
MOVING TO AND FROM BED TO CHAIR: A LITTLE
DRESSING REGULAR LOWER BODY CLOTHING: A LITTLE
HELP NEEDED FOR BATHING: A LOT
PERSONAL GROOMING: A LITTLE
TURNING FROM BACK TO SIDE WHILE IN FLAT BAD: A LITTLE
DAILY ACTIVITIY SCORE: 16
HELP NEEDED FOR BATHING: A LITTLE
HELP NEEDED FOR BATHING: A LITTLE
DRESSING REGULAR UPPER BODY CLOTHING: A LITTLE
DRESSING REGULAR LOWER BODY CLOTHING: A LOT
CLIMB 3 TO 5 STEPS WITH RAILING: A LOT
TOILETING: A LOT
MOBILITY SCORE: 17
WALKING IN HOSPITAL ROOM: A LITTLE
TOILETING: A LITTLE
CLIMB 3 TO 5 STEPS WITH RAILING: A LOT
TURNING FROM BACK TO SIDE WHILE IN FLAT BAD: A LITTLE
MOVING FROM LYING ON BACK TO SITTING ON SIDE OF FLAT BED WITH BEDRAILS: A LITTLE
MOVING TO AND FROM BED TO CHAIR: A LITTLE
STANDING UP FROM CHAIR USING ARMS: A LITTLE
MOVING FROM LYING ON BACK TO SITTING ON SIDE OF FLAT BED WITH BEDRAILS: A LITTLE
DRESSING REGULAR UPPER BODY CLOTHING: A LITTLE
WALKING IN HOSPITAL ROOM: A LITTLE
DAILY ACTIVITIY SCORE: 19
DRESSING REGULAR UPPER BODY CLOTHING: A LITTLE

## 2025-07-03 ASSESSMENT — PAIN - FUNCTIONAL ASSESSMENT
PAIN_FUNCTIONAL_ASSESSMENT: 0-10

## 2025-07-03 ASSESSMENT — PAIN DESCRIPTION - LOCATION: LOCATION: BACK

## 2025-07-03 NOTE — CARE PLAN
Transitional Care Coordinator Note: Patient discussed with medical team, per medical team patient is not medically ready.   Discharge dispo:  [facility], declined Patient.  [facility] Pending acceptance. ADOD 2-3 Days  Radha Beltran RN  Transitional Care Coordinator      Update: 6:27pm  [facility] Accepted. No Precert Needed. ADOD weekend.

## 2025-07-03 NOTE — PROGRESS NOTES
"    Department of Neurosurgery  Daily Progress Note    Patient Name: Lanie Pablo  MRN: [id number]  Date:  07/03/25     Subjective  No overnight events noted. Patient states lumbar and radicular leg pain improved postop. Patient does c/o incisional back pain. Patient denies N/T of lower extremities.     Objective    Vital Signs  /63 (BP Location: Right arm)   Pulse 68   Temp 36.3 °C (97.3 °F)   Resp 18   Ht 1.626 m (5' 4\")   Wt 86.6 kg (190 lb 14.7 oz)   SpO2 93%   BMI 32.77 kg/m²      Physical Exam  Constitutional: A&Ox3, calm and cooperative, NAD.  Eyes: PERRL, EOMI.   ENMT: Moist mucous membranes, no apparent injuries or lesions.  Cardiovascular: Normal rate and regular rhythm. 2+ equal pulses of the distal extremities.  Respiratory/Thorax: CTAB, regular respirations on RA. Good symmetric chest expansion.   Gastrointestinal: Abdomen soft, non tender.   Genitourinary: Wild    Neurological:   A&Ox3  Face symmetric, Facial SILT   Tongue midline and symmetric  RUE D5 / B5 / T5 / HG 5/ IO 5  LUE D5 / B5 / T5 / HG 5/ IO 5  RLE HF5 / KE 5/ DF 5/ PF 5  LLE HF5 / KE 5/ DF 5/ PF 5  No clonus, neg Gutierrez    Psychological: Appropriate mood and behavior.   Skin: Warm and dry. Incision c/d/i    Diagnostics   Results for orders placed or performed during the hospital encounter of 07/01/25 (from the past 24 hours)   CBC   Result Value Ref Range    WBC 9.1 4.4 - 11.3 x10*3/uL    nRBC 0.0 0.0 - 0.0 /100 WBCs    RBC 3.07 (L) 4.00 - 5.20 x10*6/uL    Hemoglobin 8.9 (L) 12.0 - 16.0 g/dL    Hematocrit 28.9 (L) 36.0 - 46.0 %    MCV 94 80 - 100 fL    MCH 29.0 26.0 - 34.0 pg    MCHC 30.8 (L) 32.0 - 36.0 g/dL    RDW 13.6 11.5 - 14.5 %    Platelets 152 150 - 450 x10*3/uL       Assessment/Plan  Lanie Pablo is a 70 y.o. female with a past medical history includes depression, HLD, artial fibrillation, orthostatic hypotension, vasovagal syncope, CHF, Asthma, CKD3,  Iron deficiency anemia, B12 deficiency, GERD, s/p " gastric sleeve 2005, bowel incontinence, Chronic bilateral low back pain with bilateral sciatica, DDD and OA,. The patient is currently scheduled for Exploration of Prior T10-Pelvis Fusion with revision of amanda fracture at L3-L4 and S1 Fusion bilaterally as well as revision posterolateral arthrodesis L5-S1  on 07/01/2025  secondary to Chronic bilateral low back pain with bilateral sciatica.     7/2 EOS with good alignment and hardware in posn    # S/p Exploration of Prior T10-Pelvis Fusion with revision of amanda fracture at L3-L4 and S1 Fusion   - Monitor VS/pulse ox A5qizus   - Monitor AM CBC/RFP  -- Continue drain care per nursing       · Empty and record output at least every 8 hours       · Strip drains TID and PRN to avoid drain obstruction    - Will DC with drain. Date pending       # Acute postoperative pain  - Well controlled per patient, continue current regimen       ·  Tylenol 650mg PO [id number]        ·  Oxycodone 2.5/5/10mg PO [id number] PRN mild/mod/severe pain        ·  Dilaudid 0.2mg IV C5osztr PRN breakthrough pain   - Bowel regimen with Colace 100 mg PO BID while using narcotics  - Zofran PRN nausea due to narcotics   - Pain assessments [id number]    # Hx of atrial fibrillation  - Continue home Flecainide 50mg BID     Prophylaxis:   - DVT: SQ heparin [id number], SCDs, encourage ambulation   - Encourage IS x10 every hour while awake     FEN/GI:  - Monitor/replete electrolytes PRN   - IVF discontinue when PO intake adequate   - Continue Regular diet   - GI protection with Protonix 40mg daily   - Bowel regimen: Scheduled Miralax and pericolace daily     Medically ready for dispo

## 2025-07-03 NOTE — PROGRESS NOTES
Occupational Therapy    Evaluation    Patient Name: Lanie Pablo  MRN: [id number]  Today's Date: 7/3/2025  Time Calculation  Start Time: 0854  Stop Time: 0905  Time Calculation (min): 11 min    Assessment  IP OT Assessment  OT Assessment: Pt required min assist with functional bed mobility, and min assist with functional transfers. Pt was limited to complete further activity due to pain. Pt was educated on adaptive ways to complete lower body ADLs while adhering to spine precautions. Anticipate mod to max assist with lower body ADLs due to impaired ability to reach B distal LE at this time.  Prognosis: Good  Barriers to Discharge Home: Caregiver assistance, Physical needs  Caregiver Assistance: Caregiver assistance needed per identified barriers - however, level of patient's required assistance exceeds assistance available at home  Physical Needs: Intermittent mobility assistance needed, Intermittent ADL assistance needed, High falls risk due to function or environment  Evaluation/Treatment Tolerance: Patient limited by pain  Medical Staff Made Aware: Yes  End of Session Communication: Bedside nurse  End of Session Patient Position: Up in chair, Alarm off, not on at start of session  Plan:  Treatment Interventions: ADL retraining, Functional transfer training, Endurance training  OT Frequency: 4 times per week (during this hospitalization)  OT Discharge Recommendations: High intensity level of continued care (Based on current functional status and rehab potential, patient is anticipated to tolerate and benefit from 5 or more days per week of skilled rehabilitative therapy after discharge from this acute inpatient hospitalization.)  OT Recommended Transfer Status: Minimal assist, Assist of 1  OT - OK to Discharge: Yes    Subjective   Current Problem:  1. Chronic bilateral low back pain with bilateral sciatica  Full code    Insert and maintain peripheral IV    Saline lock IV    povidone-iodine 5 % kit kit     celecoxib (CeleBREX) capsule 400 mg    acetaminophen (Tylenol) tablet 975 mg    tranexamic acid (Lysteda) tablet 1,300 mg    Admit to inpatient    Full code    Insert and maintain peripheral IV    Saline lock IV    Admit to inpatient    CANCELED: NPO Diet Except: Sips with meds; Effective now    CANCELED: Height and weight    CANCELED: Type And Screen    CANCELED: Apply sequential compression device    CANCELED: Apply CROW hose    CANCELED: NPO Diet Except: Sips with meds; Effective now    CANCELED: Height and weight    CANCELED: Apply sequential compression device    CANCELED: Apply CROW hose        General:  Reason for Referral: S/p Exploration of Prior T10-Pelvis Fusion with revision of amanda fracture at L3-L4 and S1 Fusion  Past Medical History Relevant to Rehab: Depression, HLD, artial fibrillation, CHF, Asthma, CKD3,  Iron deficiency anemia, GERD, s/p gastric sleeve 2005, Chronic bilateral low back pain with bilateral sciatica, DDD and OA  Prior to Session Communication: Bedside nurse  Patient Position Received: Bed, 3 rail up, Alarm off, not on at start of session  Family/Caregiver Present: No  General Comment: supine with HOB elevated at the start of the session.   Precautions:  Medical Precautions: Fall precautions  Post-Surgical Precautions: Spinal precautions    Pain:  Pain Assessment  Pain Assessment: 0-10  0-10 (Numeric) Pain Score: 4  Pain Type: Acute pain, Surgical pain (at rest. However, reported increased pain with movement, pain not rated while sitting at the side of the bed.)        Objective   Cognition:  Overall Cognitive Status: Within Functional Limits  Orientation Level: Oriented X4           Home Living:  Type of Home: House  Lives With: Spouse  Home Adaptive Equipment: Walker rolling or standard  Home Layout: One level  Home Access: Stairs to enter with rails  Entrance Stairs-Rails: Right  Bathroom Shower/Tub: Tub/shower unit  Bathroom Equipment: Bedside commode (has a shower chair that can be  put in the shower if needed.)  Home Living Comments: also has a reacher and a sock-aid.   Prior Function:  Level of Winn: Independent with ADLs and functional transfers  Ambulatory Assistance: Independent  IADL History:     ADL:  Eating Assistance: Independent  Grooming Assistance: Stand by  Grooming Deficit: Setup (anticipate)  LE Dressing Assistance: Maximal  LE Dressing Deficit: Don/doff R sock, Don/doff L sock (anticipate)  Activity Tolerance:  Endurance: Endurance does not limit participation in activity  Balance:  Dynamic Standing Balance  Dynamic Standing-Balance Support: Bilateral upper extremity supported  Dynamic Standing-Level of Assistance: Contact guard  Dynamic Standing-Balance:  (using a wheeled walker)  Dynamic Standing-Comments: Pt presented with fair dynamic standing balance, pt presented with impaired functional endurance while completing ambulation from the bed to the chair due to current pain level.  Bed Mobility/Transfers: Bed Mobility  Bed Mobility: Yes  Bed Mobility 1  Bed Mobility 1: Supine to sitting, Log roll  Level of Assistance 1: Minimum assistance, Minimal verbal cues  Bed Mobility Comments 1: HOB elevated.   and Transfers  Transfer: Yes  Transfer 1  Transfer From 1: Bed to  Transfer to 1: Stand  Technique 1: Sit to stand  Transfer Device 1: Walker  Transfer Level of Assistance 1: Minimum assistance, Minimal verbal cues  Transfers 2  Transfer From 2: Stand to  Transfer to 2: Chair with arms  Technique 2: Stand to sit  Transfer Device 2: Walker  Transfer Level of Assistance 2: Contact guard, Minimal verbal cues    Vision:     and Vision - Complex Assessment  Ocular Range of Motion: Within Functional Limits  Sensation:  Light Touch: No apparent deficits    Perception:  Inattention/Neglect: Appears intact  Coordination:  Movements are Fluid and Coordinated: Yes   Hand Function:  Hand Function  Gross Grasp: Functional  Coordination: Functional  Extremities: RUE   TREE :  (R proximal  to distal UE AROM and strength observed during functional activity and appeared grossly WFL. B shoulder MMT deferred due to spine precautions.), LUE   LUE:  (Proximal to distal UE AROM and strength grossly WFL as observed during functional activity. B shoulder MMT deferred due to recent spine sx.),      Outcome Measures: [facility] Daily Activity  Putting on and taking off regular lower body clothing: A lot  Bathing (including washing, rinsing, drying): A lot  Putting on and taking off regular upper body clothing: A little  Toileting, which includes using toilet, bedpan or urinal: A lot  Taking care of personal grooming such as brushing teeth: A little  Eating Meals: None  Daily Activity - Total Score: 16         ,     OT Adult Other Outcome Measures  4AT: negative    Education Documentation  Body Mechanics, taught by Luly Holman OT at 7/3/2025  9:25 AM.  Learner: Patient  Readiness: Acceptance  Method: Explanation  Response: Verbalizes Understanding  Comment: spine precautions, log roll.    Precautions, taught by Luly Holman OT at 7/3/2025  9:25 AM.  Learner: Patient  Readiness: Acceptance  Method: Explanation  Response: Verbalizes Understanding  Comment: spine precautions, log roll.    ADL Training, taught by Luly Holman OT at 7/3/2025  9:25 AM.  Learner: Patient  Readiness: Acceptance  Method: Explanation  Response: Verbalizes Understanding  Comment: spine precautions, log roll.    Education Comments  No comments found.        Goals:   Encounter Problems       Encounter Problems (Active)       ADLs       Patient will perform UB and LB bathing  with set-up, supervision level of assistance and grab bars. (Progressing)       Start:  07/03/25    Expected End:  07/24/25            Patient with complete lower body dressing with set-up, stand by assist level of assistance donning and doffing all LE clothes  with PRN adaptive equipment while edge of bed  (Progressing)       Start:  07/03/25    Expected End:   07/24/25            Patient will complete toileting including hygiene clothing management/hygiene with supervision level of assistance and grab bars. (Progressing)       Start:  07/03/25    Expected End:  07/24/25               BALANCE       Pt will maintain dynamic standing balance during ADL task with stand by assist level of assistance in order to demonstrate decreased risk of falling and improved postural control. (Progressing)       Start:  07/03/25    Expected End:  07/24/25               TRANSFERS       Patient will perform bed mobility supervision, stand by assist level of assistance and bed rails in order to improve safety and independence with mobility (Progressing)       Start:  07/03/25    Expected End:  07/24/25            Patient will complete sit to stand transfer with set-up, supervision level of assistance and least restrictive device in order to improve safety and prepare for out of bed mobility. (Progressing)       Start:  07/03/25    Expected End:  07/24/25 07/03/25 at 9:26 AM   Luly Holman OTR/OTD  Rehab Office: [phone]

## 2025-07-03 NOTE — CARE PLAN
The patient's goals for the shift include  comfort    The clinical goals for the shift include Patent will remain free from injury

## 2025-07-03 NOTE — PROGRESS NOTES
Physical Therapy                 Therapy Communication Note    Patient Name: Lanie Pablo  MRN: [id number]  Department: [facility]  Room: 202/202-A  Today's Date: 7/3/2025     Discipline: Physical Therapy    PT Missed Visit: Yes     Missed Visit Reason: Patient refused (Pt politely declining.  Had just gotten back to bed after getting washed up with RN staff in bathroom.  Requesting PT visit tomorrow.)    Missed Time: 1610 Attempt

## 2025-07-03 NOTE — CARE PLAN
The patient's goals for the shift include  Patient will have improved pain management     The clinical goals for the shift include Patient will remain free from injury

## 2025-07-03 NOTE — PROGRESS NOTES
Physical Therapy                 Therapy Communication Note    Patient Name: Lanie Pablo  MRN: [id number]  Department: [place] [address]  Room: 202/202-A  Today's Date: 7/3/2025     Discipline: Physical Therapy    PT Missed Visit: Yes     Missed Visit Reason: Other (Comment) (Conflict of service.  Patient Care)    Missed Time: 1516 Attempt

## 2025-07-04 LAB
ANION GAP SERPL CALC-SCNC: 8 MMOL/L (ref 10–20)
BUN SERPL-MCNC: 12 MG/DL (ref 6–23)
CALCIUM SERPL-MCNC: 8.5 MG/DL (ref 8.6–10.6)
CHLORIDE SERPL-SCNC: 106 MMOL/L (ref 98–107)
CO2 SERPL-SCNC: 30 MMOL/L (ref 21–32)
CREAT SERPL-MCNC: 0.87 MG/DL (ref 0.5–1.05)
EGFRCR SERPLBLD CKD-EPI 2021: 71 ML/MIN/1.73M*2
ERYTHROCYTE [DISTWIDTH] IN BLOOD BY AUTOMATED COUNT: 14 % (ref 11.5–14.5)
GLUCOSE BLD MANUAL STRIP-MCNC: 113 MG/DL (ref 74–99)
GLUCOSE BLD MANUAL STRIP-MCNC: 84 MG/DL (ref 74–99)
GLUCOSE BLD MANUAL STRIP-MCNC: 87 MG/DL (ref 74–99)
GLUCOSE SERPL-MCNC: 96 MG/DL (ref 74–99)
HCT VFR BLD AUTO: 29.3 % (ref 36–46)
HGB BLD-MCNC: 8.8 G/DL (ref 12–16)
MCH RBC QN AUTO: 28.6 PG (ref 26–34)
MCHC RBC AUTO-ENTMCNC: 30 G/DL (ref 32–36)
MCV RBC AUTO: 95 FL (ref 80–100)
NRBC BLD-RTO: 0 /100 WBCS (ref 0–0)
PLATELET # BLD AUTO: 154 X10*3/UL (ref 150–450)
POTASSIUM SERPL-SCNC: 4 MMOL/L (ref 3.5–5.3)
RBC # BLD AUTO: 3.08 X10*6/UL (ref 4–5.2)
SODIUM SERPL-SCNC: 140 MMOL/L (ref 136–145)
WBC # BLD AUTO: 7.5 X10*3/UL (ref 4.4–11.3)

## 2025-07-04 PROCEDURE — 82374 ASSAY BLOOD CARBON DIOXIDE: CPT

## 2025-07-04 PROCEDURE — 85027 COMPLETE CBC AUTOMATED: CPT

## 2025-07-04 PROCEDURE — 2500000004 HC RX 250 GENERAL PHARMACY W/ HCPCS (ALT 636 FOR OP/ED): Mod: JW

## 2025-07-04 PROCEDURE — 2500000001 HC RX 250 WO HCPCS SELF ADMINISTERED DRUGS (ALT 637 FOR MEDICARE OP): Performed by: PHYSICIAN ASSISTANT

## 2025-07-04 PROCEDURE — 97116 GAIT TRAINING THERAPY: CPT | Mod: GP,CQ

## 2025-07-04 PROCEDURE — 36415 COLL VENOUS BLD VENIPUNCTURE: CPT

## 2025-07-04 PROCEDURE — 2500000004 HC RX 250 GENERAL PHARMACY W/ HCPCS (ALT 636 FOR OP/ED): Performed by: STUDENT IN AN ORGANIZED HEALTH CARE EDUCATION/TRAINING PROGRAM

## 2025-07-04 PROCEDURE — 2500000001 HC RX 250 WO HCPCS SELF ADMINISTERED DRUGS (ALT 637 FOR MEDICARE OP): Performed by: STUDENT IN AN ORGANIZED HEALTH CARE EDUCATION/TRAINING PROGRAM

## 2025-07-04 PROCEDURE — 97530 THERAPEUTIC ACTIVITIES: CPT | Mod: GP,CQ

## 2025-07-04 PROCEDURE — 1200000002 HC GENERAL ROOM WITH TELEMETRY DAILY

## 2025-07-04 PROCEDURE — 82947 ASSAY GLUCOSE BLOOD QUANT: CPT

## 2025-07-04 PROCEDURE — 2500000004 HC RX 250 GENERAL PHARMACY W/ HCPCS (ALT 636 FOR OP/ED): Performed by: PHYSICIAN ASSISTANT

## 2025-07-04 RX ADMIN — OXYCODONE HYDROCHLORIDE 5 MG: 5 TABLET ORAL at 06:04

## 2025-07-04 RX ADMIN — CYCLOBENZAPRINE 10 MG: 10 TABLET, FILM COATED ORAL at 15:40

## 2025-07-04 RX ADMIN — ACETAMINOPHEN 650 MG: 325 TABLET, FILM COATED ORAL at 12:15

## 2025-07-04 RX ADMIN — HEPARIN SODIUM 5000 UNITS: 5000 INJECTION, SOLUTION INTRAVENOUS; SUBCUTANEOUS at 01:25

## 2025-07-04 RX ADMIN — HEPARIN SODIUM 5000 UNITS: 5000 INJECTION, SOLUTION INTRAVENOUS; SUBCUTANEOUS at 08:53

## 2025-07-04 RX ADMIN — ACETAMINOPHEN 650 MG: 325 TABLET, FILM COATED ORAL at 18:17

## 2025-07-04 RX ADMIN — HEPARIN SODIUM 5000 UNITS: 5000 INJECTION, SOLUTION INTRAVENOUS; SUBCUTANEOUS at 17:01

## 2025-07-04 RX ADMIN — ACETAMINOPHEN 650 MG: 325 TABLET, FILM COATED ORAL at 23:31

## 2025-07-04 RX ADMIN — CITALOPRAM HYDROBROMIDE 40 MG: 40 TABLET ORAL at 08:54

## 2025-07-04 RX ADMIN — HEPARIN SODIUM 5000 UNITS: 5000 INJECTION, SOLUTION INTRAVENOUS; SUBCUTANEOUS at 23:31

## 2025-07-04 RX ADMIN — HYDROMORPHONE HYDROCHLORIDE 0.4 MG: 1 INJECTION, SOLUTION INTRAMUSCULAR; INTRAVENOUS; SUBCUTANEOUS at 08:55

## 2025-07-04 RX ADMIN — FLECAINIDE ACETATE 50 MG: 50 TABLET ORAL at 08:53

## 2025-07-04 RX ADMIN — ACETAMINOPHEN 650 MG: 325 TABLET, FILM COATED ORAL at 01:25

## 2025-07-04 RX ADMIN — FLECAINIDE ACETATE 50 MG: 50 TABLET ORAL at 20:35

## 2025-07-04 RX ADMIN — MAGNESIUM OXIDE TAB 400 MG (241.3 MG ELEMENTAL MG) 1 TABLET: 400 (241.3 MG) TAB at 08:55

## 2025-07-04 RX ADMIN — OXYCODONE HYDROCHLORIDE 10 MG: 10 TABLET ORAL at 21:11

## 2025-07-04 RX ADMIN — CYCLOBENZAPRINE 10 MG: 10 TABLET, FILM COATED ORAL at 20:35

## 2025-07-04 RX ADMIN — Medication 25 MCG: at 08:53

## 2025-07-04 RX ADMIN — ACETAMINOPHEN 650 MG: 325 TABLET, FILM COATED ORAL at 06:04

## 2025-07-04 RX ADMIN — OXYCODONE HYDROCHLORIDE 10 MG: 10 TABLET ORAL at 17:01

## 2025-07-04 RX ADMIN — OXYCODONE HYDROCHLORIDE 10 MG: 10 TABLET ORAL at 12:15

## 2025-07-04 RX ADMIN — PANTOPRAZOLE SODIUM 40 MG: 40 TABLET, DELAYED RELEASE ORAL at 06:04

## 2025-07-04 RX ADMIN — VITAM B12 500 MCG: 100 TAB at 08:54

## 2025-07-04 RX ADMIN — SENNOSIDES AND DOCUSATE SODIUM 2 TABLET: 50; 8.6 TABLET ORAL at 20:35

## 2025-07-04 RX ADMIN — CYCLOBENZAPRINE 10 MG: 10 TABLET, FILM COATED ORAL at 08:53

## 2025-07-04 SDOH — SOCIAL STABILITY: SOCIAL INSECURITY
WITHIN THE LAST YEAR, HAVE YOU BEEN KICKED, HIT, SLAPPED, OR OTHERWISE PHYSICALLY HURT BY YOUR PARTNER OR EX-PARTNER?: NO

## 2025-07-04 SDOH — SOCIAL STABILITY: SOCIAL INSECURITY: WITHIN THE LAST YEAR, HAVE YOU BEEN AFRAID OF YOUR PARTNER OR EX-PARTNER?: NO

## 2025-07-04 SDOH — SOCIAL STABILITY: SOCIAL INSECURITY: DO YOU FEEL UNSAFE GOING BACK TO THE PLACE WHERE YOU ARE LIVING?: NO

## 2025-07-04 SDOH — SOCIAL STABILITY: SOCIAL INSECURITY: WITHIN THE LAST YEAR, HAVE YOU BEEN HUMILIATED OR EMOTIONALLY ABUSED IN OTHER WAYS BY YOUR PARTNER OR EX-PARTNER?: NO

## 2025-07-04 SDOH — HEALTH STABILITY: MENTAL HEALTH: HOW OFTEN DO YOU HAVE SIX OR MORE DRINKS ON ONE OCCASION?: NEVER

## 2025-07-04 SDOH — HEALTH STABILITY: MENTAL HEALTH: HOW OFTEN DO YOU HAVE A DRINK CONTAINING ALCOHOL?: NEVER

## 2025-07-04 SDOH — ECONOMIC STABILITY: FOOD INSECURITY: HOW HARD IS IT FOR YOU TO PAY FOR THE VERY BASICS LIKE FOOD, HOUSING, MEDICAL CARE, AND HEATING?: NOT HARD AT ALL

## 2025-07-04 SDOH — SOCIAL STABILITY: SOCIAL INSECURITY: ABUSE: ADULT

## 2025-07-04 SDOH — SOCIAL STABILITY: SOCIAL INSECURITY: WERE YOU ABLE TO COMPLETE ALL THE BEHAVIORAL HEALTH SCREENINGS?: YES

## 2025-07-04 SDOH — ECONOMIC STABILITY: FOOD INSECURITY: WITHIN THE PAST 12 MONTHS, YOU WORRIED THAT YOUR FOOD WOULD RUN OUT BEFORE YOU GOT THE MONEY TO BUY MORE.: NEVER TRUE

## 2025-07-04 SDOH — SOCIAL STABILITY: SOCIAL INSECURITY: ARE YOU OR HAVE YOU BEEN THREATENED OR ABUSED PHYSICALLY, EMOTIONALLY, OR SEXUALLY BY ANYONE?: NO

## 2025-07-04 SDOH — ECONOMIC STABILITY: FOOD INSECURITY: WITHIN THE PAST 12 MONTHS, THE FOOD YOU BOUGHT JUST DIDN'T LAST AND YOU DIDN'T HAVE MONEY TO GET MORE.: NEVER TRUE

## 2025-07-04 SDOH — HEALTH STABILITY: MENTAL HEALTH: HOW MANY DRINKS CONTAINING ALCOHOL DO YOU HAVE ON A TYPICAL DAY WHEN YOU ARE DRINKING?: PATIENT DOES NOT DRINK

## 2025-07-04 SDOH — ECONOMIC STABILITY: INCOME INSECURITY: IN THE PAST 12 MONTHS HAS THE ELECTRIC, GAS, OIL, OR WATER COMPANY THREATENED TO SHUT OFF SERVICES IN YOUR HOME?: NO

## 2025-07-04 SDOH — SOCIAL STABILITY: SOCIAL INSECURITY
WITHIN THE LAST YEAR, HAVE YOU BEEN RAPED OR FORCED TO HAVE ANY KIND OF SEXUAL ACTIVITY BY YOUR PARTNER OR EX-PARTNER?: NO

## 2025-07-04 SDOH — SOCIAL STABILITY: SOCIAL INSECURITY: HAVE YOU HAD ANY THOUGHTS OF HARMING ANYONE ELSE?: NO

## 2025-07-04 SDOH — SOCIAL STABILITY: SOCIAL INSECURITY
ASK PARENT OR GUARDIAN: ARE THERE TIMES WHEN YOU, YOUR CHILD(REN), OR ANY MEMBER OF YOUR HOUSEHOLD FEEL UNSAFE, HARMED, OR THREATENED AROUND PERSONS WITH WHOM YOU KNOW OR LIVE?: NO

## 2025-07-04 SDOH — ECONOMIC STABILITY: HOUSING INSECURITY: DO YOU FEEL UNSAFE GOING BACK TO THE PLACE WHERE YOU LIVE?: NO

## 2025-07-04 SDOH — SOCIAL STABILITY: SOCIAL INSECURITY: ARE THERE ANY APPARENT SIGNS OF INJURIES/BEHAVIORS THAT COULD BE RELATED TO ABUSE/NEGLECT?: NO

## 2025-07-04 SDOH — SOCIAL STABILITY: SOCIAL INSECURITY: HAS ANYONE EVER THREATENED TO HURT YOUR FAMILY OR YOUR PETS?: NO

## 2025-07-04 SDOH — SOCIAL STABILITY: SOCIAL INSECURITY: DO YOU FEEL ANYONE HAS EXPLOITED OR TAKEN ADVANTAGE OF YOU FINANCIALLY OR OF YOUR PERSONAL PROPERTY?: NO

## 2025-07-04 SDOH — SOCIAL STABILITY: SOCIAL INSECURITY: DOES ANYONE TRY TO KEEP YOU FROM HAVING/CONTACTING OTHER FRIENDS OR DOING THINGS OUTSIDE YOUR HOME?: NO

## 2025-07-04 SDOH — SOCIAL STABILITY: SOCIAL INSECURITY: HAVE YOU HAD THOUGHTS OF HARMING ANYONE ELSE?: NO

## 2025-07-04 ASSESSMENT — ACTIVITIES OF DAILY LIVING (ADL)
LACK_OF_TRANSPORTATION: NO
WALKS IN HOME: INDEPENDENT
BATHING: INDEPENDENT
TOILETING: INDEPENDENT
FEEDING YOURSELF: INDEPENDENT
PATIENT'S MEMORY ADEQUATE TO SAFELY COMPLETE DAILY ACTIVITIES?: YES
GROOMING: INDEPENDENT
TOILETING: INDEPENDENT
JUDGMENT_ADEQUATE_SAFELY_COMPLETE_DAILY_ACTIVITIES: YES
FEEDING YOURSELF: INDEPENDENT
HEARING - RIGHT EAR: FUNCTIONAL
GROOMING: INDEPENDENT
ADEQUATE_TO_COMPLETE_ADL: YES
ADEQUATE_TO_COMPLETE_ADL: YES
HEARING - RIGHT EAR: FUNCTIONAL
DRESSING YOURSELF: INDEPENDENT
WALKS IN HOME: INDEPENDENT
BATHING: INDEPENDENT
JUDGMENT_ADEQUATE_SAFELY_COMPLETE_DAILY_ACTIVITIES: YES
DRESSING YOURSELF: INDEPENDENT
PATIENT'S MEMORY ADEQUATE TO SAFELY COMPLETE DAILY ACTIVITIES?: YES
HEARING - LEFT EAR: FUNCTIONAL

## 2025-07-04 ASSESSMENT — LIFESTYLE VARIABLES
HOW MANY STANDARD DRINKS CONTAINING ALCOHOL DO YOU HAVE ON A TYPICAL DAY: PATIENT DOES NOT DRINK
AUDIT-C TOTAL SCORE: 0
SKIP TO QUESTIONS 9-10: 1
HOW OFTEN DO YOU HAVE 6 OR MORE DRINKS ON ONE OCCASION: NEVER

## 2025-07-04 ASSESSMENT — PAIN - FUNCTIONAL ASSESSMENT
PAIN_FUNCTIONAL_ASSESSMENT: 0-10

## 2025-07-04 ASSESSMENT — PAIN SCALES - GENERAL
PAINLEVEL_OUTOF10: 7
PAINLEVEL_OUTOF10: 5 - MODERATE PAIN

## 2025-07-04 ASSESSMENT — PATIENT HEALTH QUESTIONNAIRE - PHQ9
1. LITTLE INTEREST OR PLEASURE IN DOING THINGS: NOT AT ALL
2. FEELING DOWN, DEPRESSED OR HOPELESS: NOT AT ALL
SUM OF ALL RESPONSES TO PHQ9 QUESTIONS 1 & 2: 0

## 2025-07-04 ASSESSMENT — COGNITIVE AND FUNCTIONAL STATUS - GENERAL
TURNING FROM BACK TO SIDE WHILE IN FLAT BAD: A LITTLE
WALKING IN HOSPITAL ROOM: A LITTLE
CLIMB 3 TO 5 STEPS WITH RAILING: A LOT
PATIENT BASELINE BEDBOUND: NO
MOVING FROM LYING ON BACK TO SITTING ON SIDE OF FLAT BED WITH BEDRAILS: A LITTLE
MOBILITY SCORE: 18
MOBILITY SCORE: 17
MOVING TO AND FROM BED TO CHAIR: A LITTLE
WALKING IN HOSPITAL ROOM: A LITTLE
MOVING TO AND FROM BED TO CHAIR: A LITTLE
STANDING UP FROM CHAIR USING ARMS: A LITTLE
STANDING UP FROM CHAIR USING ARMS: A LITTLE
TURNING FROM BACK TO SIDE WHILE IN FLAT BAD: A LITTLE
CLIMB 3 TO 5 STEPS WITH RAILING: A LOT

## 2025-07-04 NOTE — CARE PLAN
The patient's goals for the shift include  pain management and getting adequate rest to promote healing.     The clinical goals for the shift include pt will remain safe and free from injury throughout the shift

## 2025-07-04 NOTE — PROGRESS NOTES
"    Department of Neurosurgery  Daily Progress Note    Patient Name: Lanie Pablo  MRN: [id number]  Date:  07/04/25     Subjective  No overnight events noted. Patient states lumbar and radicular leg pain improved postop. Pain is the same as the day prior. Patient had a bowel movement, has been up and walking.    Objective    Vital Signs  /70 (BP Location: Right arm, Patient Position: Lying)   Pulse 75   Temp 36.2 °C (97.2 °F) (Temporal)   Resp 18   Ht 1.626 m (5' 4\")   Wt 86.6 kg (190 lb 14.7 oz)   SpO2 96%   BMI 32.77 kg/m²      Physical Exam  Constitutional: A&Ox3, calm and cooperative, NAD.  Eyes: PERRL, EOMI.   ENMT: Moist mucous membranes, no apparent injuries or lesions.  Cardiovascular: Normal rate and regular rhythm. 2+ equal pulses of the distal extremities.  Respiratory/Thorax: CTAB, regular respirations on RA. Good symmetric chest expansion.   Gastrointestinal: Abdomen soft, non tender.   Genitourinary: Wild    Neurological:   A&Ox3  Face symmetric, Facial SILT   Tongue midline and symmetric  RUE D5 / B5 / T5 / HG 5/ IO 5  LUE D5 / B5 / T5 / HG 5/ IO 5  RLE HF5 / KE 5/ DF 5/ PF 5  LLE HF5 / KE 5/ DF 5/ PF 5  No clonus, neg Gutierrez    Psychological: Appropriate mood and behavior.   Skin: Warm and dry. Incision c/d/i    Diagnostics   Results for orders placed or performed during the hospital encounter of 07/01/25 (from the past 24 hours)   Basic metabolic panel   Result Value Ref Range    Glucose 96 74 - 99 mg/dL    Sodium 140 136 - 145 mmol/L    Potassium 4.0 3.5 - 5.3 mmol/L    Chloride 106 98 - 107 mmol/L    Bicarbonate 30 21 - 32 mmol/L    Anion Gap 8 (L) 10 - 20 mmol/L    Urea Nitrogen 12 6 - 23 mg/dL    Creatinine 0.87 0.50 - 1.05 mg/dL    eGFR 71 >60 mL/min/1.73m*2    Calcium 8.5 (L) 8.6 - 10.6 mg/dL   CBC   Result Value Ref Range    WBC 7.5 4.4 - 11.3 x10*3/uL    nRBC 0.0 0.0 - 0.0 /100 WBCs    RBC 3.08 (L) 4.00 - 5.20 x10*6/uL    Hemoglobin 8.8 (L) 12.0 - 16.0 g/dL    Hematocrit " 29.3 (L) 36.0 - 46.0 %    MCV 95 80 - 100 fL    MCH 28.6 26.0 - 34.0 pg    MCHC 30.0 (L) 32.0 - 36.0 g/dL    RDW 14.0 11.5 - 14.5 %    Platelets 154 150 - 450 x10*3/uL   POCT GLUCOSE   Result Value Ref Range    POCT Glucose 84 74 - 99 mg/dL       Assessment/Plan  Lanie Pablo is a 70 y.o. female with a past medical history includes depression, HLD, artial fibrillation, orthostatic hypotension, vasovagal syncope, CHF, Asthma, CKD3,  Iron deficiency anemia, B12 deficiency, GERD, s/p gastric sleeve 2005, bowel incontinence, Chronic bilateral low back pain with bilateral sciatica, DDD and OA,. The patient is currently scheduled for Exploration of Prior T10-Pelvis Fusion with revision of amanda fracture at L3-L4 and S1 Fusion bilaterally as well as revision posterolateral arthrodesis L5-S1  on 07/01/2025  secondary to Chronic bilateral low back pain with bilateral sciatica.     7/2 EOS with good alignment and hardware in posn    # S/p Exploration of Prior T10-Pelvis Fusion with revision of amanda fracture at L3-L4 and S1 Fusion   - Monitor VS/pulse ox [id number]   - Monitor AM CBC/RFP  -- Continue drain care per nursing       · Empty and record output at least every 8 hours       · Strip drains TID and PRN to avoid drain obstruction    - Will DC with drain. Date pending       # Acute postoperative pain  - Well controlled per patient, continue current regimen       ·  Tylenol 650mg PO [id number]        ·  Oxycodone 2.5/5/10mg PO L2ierhc PRN mild/mod/severe pain        ·  Dilaudid 0.2mg IV [id number] PRN breakthrough pain   - Bowel regimen with Colace 100 mg PO BID while using narcotics  - Zofran PRN nausea due to narcotics   - Pain assessments [id number]    # Hx of atrial fibrillation  - Continue home Flecainide 50mg BID     Prophylaxis:   - DVT: SQ heparin C1zenqh, SCDs, encourage ambulation   - Encourage IS x10 every hour while awake     FEN/GI:  - Monitor/replete electrolytes PRN   - IVF discontinue when PO intake adequate   -  Continue Regular diet   - GI protection with Protonix 40mg daily   - Bowel regimen: Scheduled Miralax and pericolace daily     Medically ready for dispo

## 2025-07-04 NOTE — PROGRESS NOTES
Transitional Care Coordination     TCC made aware [facility] accepted, patient will not require a pre-cert to admit. Per [facility] patient cannot receive IV patient meds 24 hours prior to admitting. Messaged MD via secure chat.     Addendum 1540     Signed IMM placed  in patient's chart. Patient is upset because she was not accepted by [facility]. She report, she was there previously and would like to go back. She declined [facility] as it is too for her  to drive. She is agreeable to [facility].     Indu Savage RN, BSN CM   PRN Care Coordinator

## 2025-07-05 LAB
ANION GAP SERPL CALC-SCNC: 12 MMOL/L (ref 10–20)
BLOOD EXPIRATION DATE: NORMAL
BUN SERPL-MCNC: 8 MG/DL (ref 6–23)
CALCIUM SERPL-MCNC: 8.2 MG/DL (ref 8.6–10.6)
CHLORIDE SERPL-SCNC: 104 MMOL/L (ref 98–107)
CO2 SERPL-SCNC: 25 MMOL/L (ref 21–32)
CREAT SERPL-MCNC: 0.81 MG/DL (ref 0.5–1.05)
DISPENSE STATUS: NORMAL
EGFRCR SERPLBLD CKD-EPI 2021: 77 ML/MIN/1.73M*2
ERYTHROCYTE [DISTWIDTH] IN BLOOD BY AUTOMATED COUNT: 13.6 % (ref 11.5–14.5)
GLUCOSE SERPL-MCNC: 136 MG/DL (ref 74–99)
HCT VFR BLD AUTO: 29 % (ref 36–46)
HGB BLD-MCNC: 9.1 G/DL (ref 12–16)
MCH RBC QN AUTO: 29.4 PG (ref 26–34)
MCHC RBC AUTO-ENTMCNC: 31.4 G/DL (ref 32–36)
MCV RBC AUTO: 94 FL (ref 80–100)
NRBC BLD-RTO: 0 /100 WBCS (ref 0–0)
PLATELET # BLD AUTO: 194 X10*3/UL (ref 150–450)
POTASSIUM SERPL-SCNC: 3.9 MMOL/L (ref 3.5–5.3)
PRODUCT BLOOD TYPE: 6200
PRODUCT CODE: NORMAL
RBC # BLD AUTO: 3.09 X10*6/UL (ref 4–5.2)
SODIUM SERPL-SCNC: 137 MMOL/L (ref 136–145)
UNIT ABO: NORMAL
UNIT NUMBER: NORMAL
UNIT RH: NORMAL
UNIT VOLUME: 350
WBC # BLD AUTO: 6.9 X10*3/UL (ref 4.4–11.3)
XM INTEP: NORMAL

## 2025-07-05 PROCEDURE — 36415 COLL VENOUS BLD VENIPUNCTURE: CPT

## 2025-07-05 PROCEDURE — 2500000001 HC RX 250 WO HCPCS SELF ADMINISTERED DRUGS (ALT 637 FOR MEDICARE OP)

## 2025-07-05 PROCEDURE — 97116 GAIT TRAINING THERAPY: CPT | Mod: GP

## 2025-07-05 PROCEDURE — 2500000004 HC RX 250 GENERAL PHARMACY W/ HCPCS (ALT 636 FOR OP/ED): Performed by: PHYSICIAN ASSISTANT

## 2025-07-05 PROCEDURE — 2500000001 HC RX 250 WO HCPCS SELF ADMINISTERED DRUGS (ALT 637 FOR MEDICARE OP): Performed by: PHYSICIAN ASSISTANT

## 2025-07-05 PROCEDURE — 2500000001 HC RX 250 WO HCPCS SELF ADMINISTERED DRUGS (ALT 637 FOR MEDICARE OP): Performed by: STUDENT IN AN ORGANIZED HEALTH CARE EDUCATION/TRAINING PROGRAM

## 2025-07-05 PROCEDURE — 2500000004 HC RX 250 GENERAL PHARMACY W/ HCPCS (ALT 636 FOR OP/ED): Performed by: STUDENT IN AN ORGANIZED HEALTH CARE EDUCATION/TRAINING PROGRAM

## 2025-07-05 PROCEDURE — 1100000001 HC PRIVATE ROOM DAILY

## 2025-07-05 PROCEDURE — 97530 THERAPEUTIC ACTIVITIES: CPT | Mod: GP

## 2025-07-05 PROCEDURE — 80048 BASIC METABOLIC PNL TOTAL CA: CPT

## 2025-07-05 PROCEDURE — 85027 COMPLETE CBC AUTOMATED: CPT

## 2025-07-05 RX ORDER — OXYCODONE HYDROCHLORIDE 10 MG/1
10 TABLET ORAL ONCE
Refills: 0 | Status: COMPLETED | OUTPATIENT
Start: 2025-07-05 | End: 2025-07-05

## 2025-07-05 RX ORDER — METHOCARBAMOL 500 MG/1
750 TABLET, FILM COATED ORAL ONCE
Status: COMPLETED | OUTPATIENT
Start: 2025-07-05 | End: 2025-07-05

## 2025-07-05 RX ORDER — METHOCARBAMOL 500 MG/1
750 TABLET, FILM COATED ORAL ONCE
Status: DISCONTINUED | OUTPATIENT
Start: 2025-07-05 | End: 2025-07-05

## 2025-07-05 RX ADMIN — Medication 25 MCG: at 09:27

## 2025-07-05 RX ADMIN — HEPARIN SODIUM 5000 UNITS: 5000 INJECTION, SOLUTION INTRAVENOUS; SUBCUTANEOUS at 09:28

## 2025-07-05 RX ADMIN — ACETAMINOPHEN 650 MG: 325 TABLET, FILM COATED ORAL at 12:03

## 2025-07-05 RX ADMIN — ACETAMINOPHEN 650 MG: 325 TABLET, FILM COATED ORAL at 23:47

## 2025-07-05 RX ADMIN — OXYCODONE HYDROCHLORIDE 10 MG: 10 TABLET ORAL at 05:58

## 2025-07-05 RX ADMIN — OXYCODONE HYDROCHLORIDE 10 MG: 10 TABLET ORAL at 09:59

## 2025-07-05 RX ADMIN — OXYCODONE HYDROCHLORIDE 10 MG: 10 TABLET ORAL at 20:26

## 2025-07-05 RX ADMIN — FLECAINIDE ACETATE 50 MG: 50 TABLET ORAL at 21:03

## 2025-07-05 RX ADMIN — CYCLOBENZAPRINE 10 MG: 10 TABLET, FILM COATED ORAL at 21:03

## 2025-07-05 RX ADMIN — PREGABALIN 100 MG: 50 CAPSULE ORAL at 09:27

## 2025-07-05 RX ADMIN — ACETAMINOPHEN 650 MG: 325 TABLET, FILM COATED ORAL at 18:01

## 2025-07-05 RX ADMIN — FLECAINIDE ACETATE 50 MG: 50 TABLET ORAL at 09:27

## 2025-07-05 RX ADMIN — CITALOPRAM HYDROBROMIDE 40 MG: 40 TABLET ORAL at 09:27

## 2025-07-05 RX ADMIN — ACETAMINOPHEN 650 MG: 325 TABLET, FILM COATED ORAL at 05:58

## 2025-07-05 RX ADMIN — PANTOPRAZOLE SODIUM 40 MG: 40 TABLET, DELAYED RELEASE ORAL at 06:00

## 2025-07-05 RX ADMIN — VITAM B12 500 MCG: 100 TAB at 09:27

## 2025-07-05 RX ADMIN — OXYCODONE HYDROCHLORIDE 10 MG: 10 TABLET ORAL at 01:19

## 2025-07-05 RX ADMIN — METHOCARBAMOL 750 MG: 500 TABLET ORAL at 10:55

## 2025-07-05 RX ADMIN — CYCLOBENZAPRINE 10 MG: 10 TABLET, FILM COATED ORAL at 09:27

## 2025-07-05 RX ADMIN — MAGNESIUM OXIDE TAB 400 MG (241.3 MG ELEMENTAL MG) 1 TABLET: 400 (241.3 MG) TAB at 09:27

## 2025-07-05 RX ADMIN — HEPARIN SODIUM 5000 UNITS: 5000 INJECTION, SOLUTION INTRAVENOUS; SUBCUTANEOUS at 16:13

## 2025-07-05 RX ADMIN — OXYCODONE HYDROCHLORIDE 10 MG: 10 TABLET ORAL at 10:55

## 2025-07-05 RX ADMIN — HEPARIN SODIUM 5000 UNITS: 5000 INJECTION, SOLUTION INTRAVENOUS; SUBCUTANEOUS at 23:47

## 2025-07-05 ASSESSMENT — COGNITIVE AND FUNCTIONAL STATUS - GENERAL
TURNING FROM BACK TO SIDE WHILE IN FLAT BAD: A LITTLE
MOVING TO AND FROM BED TO CHAIR: A LITTLE
CLIMB 3 TO 5 STEPS WITH RAILING: A LOT
MOVING FROM LYING ON BACK TO SITTING ON SIDE OF FLAT BED WITH BEDRAILS: A LITTLE
MOBILITY SCORE: 15
TURNING FROM BACK TO SIDE WHILE IN FLAT BAD: A LITTLE
WALKING IN HOSPITAL ROOM: A LOT
MOVING TO AND FROM BED TO CHAIR: A LITTLE
STANDING UP FROM CHAIR USING ARMS: A LITTLE
STANDING UP FROM CHAIR USING ARMS: A LITTLE
CLIMB 3 TO 5 STEPS WITH RAILING: TOTAL
MOBILITY SCORE: 18
WALKING IN HOSPITAL ROOM: A LITTLE

## 2025-07-05 ASSESSMENT — PAIN SCALES - GENERAL
PAINLEVEL_OUTOF10: 0 - NO PAIN
PAINLEVEL_OUTOF10: 7
PAINLEVEL_OUTOF10: 10 - WORST POSSIBLE PAIN
PAINLEVEL_OUTOF10: 7
PAINLEVEL_OUTOF10: 6
PAINLEVEL_OUTOF10: 10 - WORST POSSIBLE PAIN
PAINLEVEL_OUTOF10: 5 - MODERATE PAIN
PAINLEVEL_OUTOF10: 7

## 2025-07-05 ASSESSMENT — PAIN - FUNCTIONAL ASSESSMENT
PAIN_FUNCTIONAL_ASSESSMENT: 0-10

## 2025-07-05 ASSESSMENT — PAIN DESCRIPTION - LOCATION
LOCATION: BACK

## 2025-07-05 NOTE — CARE PLAN
Transitional Care Coordinator Note: Patient discussed with medical team, per medical team patient is medically ready. Discharge dispo: [facility] declined Patient. [facility] accepted Patient, but now Patient is declining to go to [facility] and will like to go home with [facility]. Medical team made aware. She stated she is not Medically ready to leave yet due to Pain. Medical Team notified.  [facility] asked Medical team to placed  HC Referral. ADOD 7/6  Radha Beltran RN  Transitional Care Coordinator

## 2025-07-05 NOTE — CARE PLAN
The patient's goals for the shift include      The clinical goals for the shift include Pt pain will be rated less than 6/10 during shift    Over the shift, the patient had patient under control with an extra dose of pain medication and robaxin.

## 2025-07-05 NOTE — CARE PLAN
The patient's goals for the shift include      The clinical goals for the shift include Pt's neuro exam will remain stable overnight    Problem: Pain - Adult  Goal: Verbalizes/displays adequate comfort level or baseline comfort level  Outcome: Progressing     Problem: Safety - Adult  Goal: Free from fall injury  Outcome: Progressing     Problem: Discharge Planning  Goal: Discharge to home or other facility with appropriate resources  Outcome: Progressing     Problem: Chronic Conditions and Co-morbidities  Goal: Patient's chronic conditions and co-morbidity symptoms are monitored and maintained or improved  Outcome: Progressing     Problem: Nutrition  Goal: Nutrient intake appropriate for maintaining nutritional needs  Outcome: Progressing     Problem: Skin  Goal: Prevent/minimize sheer/friction injuries  Outcome: Progressing     Problem: Pain  Goal: Takes deep breaths with improved pain control throughout the shift  Outcome: Progressing     Problem: Pain  Goal: Turns in bed with improved pain control throughout the shift  Outcome: Progressing     Problem: Pain  Goal: Walks with improved pain control throughout the shift  Outcome: Progressing

## 2025-07-05 NOTE — PROGRESS NOTES
Physical Therapy    Physical Therapy Treatment    Patient Name: Lanie Pablo  MRN: [id number]  Department: [place] [address]  Room: 202/202A  Today's Date: 7/5/2025  Time Calculation  Start Time: 1210  Stop Time: 1239  Time Calculation (min): 29 min    Assessment/Plan   PT Assessment  Barriers to Discharge Home: Caregiver assistance, Physical needs  Caregiver Assistance: Caregiver assistance needed per identified barriers - however, level of patient's required assistance exceeds assistance available at home  Physical Needs: Stair navigation into home limited by function/safety, Intermittent mobility assistance needed, High falls risk due to function or environment  Evaluation/Treatment Tolerance: Patient tolerated treatment well  Medical Staff Made Aware: Yes  End of Session Communication: Bedside nurse  Assessment Comment: Pt very lethargic today and requiring increased assist.  Remains appropriate high intensity PT at time of d/c.  End of Session Patient Position: Bed, 3 rail up, Alarm on     PT Plan  Treatment/Interventions: Bed mobility, Transfer training, Gait training, Stair training, Balance training, Therapeutic exercise, Therapeutic activity  PT Plan: Ongoing PT  PT Frequency: Daily  PT Discharge Recommendations: High intensity level of continued care  PT Recommended Transfer Status: Assist x1, Assistive device  PT - OK to Discharge: Yes (Meaning pt has been evaluated and d/c recc is in place)    PT Visit Info:  PT Received On: 07/05/25  Response to Previous Treatment: Patient with no complaints from previous session.     General Visit Information:   General  Family/Caregiver Present: No  Prior to Session Communication: Bedside nurse  Patient Position Received: Bed, 3 rail up  General Comment: Supine.  Pt pleasant, cooperative and and agreeable to PT. Pt reporting pain better controlled, but very lethargic.  Able to ambulate to door today, but requiring ongoing Min A, and Mod A for LOB x1.    Subjective  "  Precautions:  Precautions  Medical Precautions: Fall precautions  Post-Surgical Precautions: Spinal precautions    Objective   Pain:  Pain Assessment  Pain Assessment: 0-10  0-10 (Numeric) Pain Score:  (Pt did not rate, but reporting pain manageable  at this time.  \"it's worse getting up and down, but then it's ok\")  Cognition:  Cognition  Overall Cognitive Status:  (lethargic, RN aware, reports pt had received a lot of pain meds)  Orientation Level: Oriented X4  Following Commands: Follows one step commands with repetition    Activity Tolerance:  Activity Tolerance  Endurance: Endurance does not limit participation in activity  Treatments:    Bed Mobility 1  Bed Mobility 1: Supine to sitting, Sitting to supine  Level of Assistance 1: Minimum assistance, Minimal verbal cues, Minimal tactile cues    Ambulation/Gait Training 1  Surface 1: Level tile  Device 1: Rolling walker  Assistance 1: Moderate assistance, Minimal verbal cues, Minimal tactile cues (Primarily Min A, but requiring Mod A for LOB x1)  Quality of Gait 1: Wide base of support, Diminished heel strike, Decreased step length  Comments/Distance (ft) 1: 5 feet, seated break, 2x 25 feet  Transfer 1  Transfer From 1: Sit to, Stand to  Transfer to 1: Sit  Transfer Device 1: Walker  Transfer Level of Assistance 1: Minimum assistance, Minimal verbal cues, Minimal tactile cues    Stairs  Stairs: No (Declined today.  Reporting if less tired tomorrow, may like to try)    Outcome Measures:    [facility] Basic Mobility  Turning from your back to your side while in a flat bed without using bedrails: A little  Moving from lying on your back to sitting on the side of a flat bed without using bedrails: A little  Moving to and from bed to chair (including a wheelchair): A little  Standing up from a chair using your arms (e.g. wheelchair or bedside chair): A little  To walk in hospital room: A lot  Climbing 3-5 steps with railing: Total  Basic Mobility - Total Score: " 15    Education Documentation  Precautions, taught by Kehinde Garcia, PT at 7/5/2025  1:36 PM.  Learner: Patient  Readiness: Eager  Method: Explanation  Response: Verbalizes Understanding    Mobility Training, taught by Kehinde Garcia, PT at 7/5/2025  1:36 PM.  Learner: Patient  Readiness: Eager  Method: Explanation  Response: Verbalizes Understanding    Education Comments  No comments found.    OP EDUCATION:       Encounter Problems       Encounter Problems (Active)       Mobility       Ambulate >150 feet, LRD, Ind  (Progressing)       Start:  07/02/25    Expected End:  07/16/25            Up/dn 4 stairs, Ind (Not Progressing)       Start:  07/02/25    Expected End:  07/16/25               PT Transfers       Pt able to perform bed mobility Ind from a flat bed without rail using Log Roll  (Progressing)       Start:  07/02/25    Expected End:  07/16/25            sit<>stand, bed<>chair, LRD, Ind  (Progressing)       Start:  07/02/25    Expected End:  07/16/25

## 2025-07-05 NOTE — PROGRESS NOTES
"    Department of Neurosurgery  Daily Progress Note    Patient Name: Lanie Pablo  MRN: [id number]  Date:  07/05/25     Subjective  No overnight events noted. Patient still in pain, would like to go to  [facility], but was denied, is accepted at [facility]. Patient had a bowel movement, has been up and walking.    Objective    Vital Signs  /62 (BP Location: Left arm, Patient Position: Lying)   Pulse 74   Temp 35.5 °C (95.9 °F) (Temporal)   Resp 18   Ht 1.626 m (5' 4\")   Wt 86.6 kg (190 lb 14.7 oz)   SpO2 92%   BMI 32.77 kg/m²      Physical Exam  Constitutional: A&Ox3, calm and cooperative, NAD.  Eyes: PERRL, EOMI.   ENMT: Moist mucous membranes, no apparent injuries or lesions.  Cardiovascular: Normal rate and regular rhythm. 2+ equal pulses of the distal extremities.  Respiratory/Thorax: CTAB, regular respirations on RA. Good symmetric chest expansion.   Gastrointestinal: Abdomen soft, non tender.   Genitourinary: Wild    Neurological:   A&Ox3  Face symmetric, Facial SILT   Tongue midline and symmetric  RUE D5 / B5 / T5 / HG 5/ IO 5  LUE D5 / B5 / T5 / HG 5/ IO 5  RLE HF5 / KE 5/ DF 5/ PF 5  LLE HF5 / KE 5/ DF 5/ PF 5  No clonus, neg Gutierrez    Psychological: Appropriate mood and behavior.   Skin: Warm and dry. Incision c/d/i    Diagnostics   Results for orders placed or performed during the hospital encounter of 07/01/25 (from the past 24 hours)   POCT GLUCOSE   Result Value Ref Range    POCT Glucose 87 74 - 99 mg/dL   POCT GLUCOSE   Result Value Ref Range    POCT Glucose 113 (H) 74 - 99 mg/dL   Basic metabolic panel   Result Value Ref Range    Glucose 136 (H) 74 - 99 mg/dL    Sodium 137 136 - 145 mmol/L    Potassium 3.9 3.5 - 5.3 mmol/L    Chloride 104 98 - 107 mmol/L    Bicarbonate 25 21 - 32 mmol/L    Anion Gap 12 10 - 20 mmol/L    Urea Nitrogen 8 6 - 23 mg/dL    Creatinine 0.81 0.50 - 1.05 mg/dL    eGFR 77 >60 mL/min/1.73m*2    Calcium 8.2 (L) 8.6 - 10.6 mg/dL "       Assessment/Plan  Lanie Pablo is a 70 y.o. female with a past medical history includes depression, HLD, artial fibrillation, orthostatic hypotension, vasovagal syncope, CHF, Asthma, CKD3,  Iron deficiency anemia, B12 deficiency, GERD, s/p gastric sleeve 2005, bowel incontinence, Chronic bilateral low back pain with bilateral sciatica, DDD and OA,. The patient is currently scheduled for Exploration of Prior T10-Pelvis Fusion with revision of amanda fracture at L3-L4 and S1 Fusion bilaterally as well as revision posterolateral arthrodesis L5-S1  on 07/01/2025  secondary to Chronic bilateral low back pain with bilateral sciatica.     7/2 EOS with good alignment and hardware in posn    # S/p Exploration of Prior T10-Pelvis Fusion with revision of amanda fracture at L3-L4 and S1 Fusion   - Monitor VS/pulse ox [id number]   - Monitor AM CBC/RFP  -- Continue drain care per nursing       · Empty and record output at least every 8 hours       · Strip drains TID and PRN to avoid drain obstruction    - Will likely discharge patient with drain. Date pending       # Acute postoperative pain  - Well controlled per patient, continue current regimen       ·  Tylenol 650mg PO [id number]        ·  Oxycodone 2.5/5/10mg PO [id number] PRN mild/mod/severe pain        ·  Dilaudid 0.2mg IV L1ysskc PRN breakthrough pain   - Bowel regimen with Colace 100 mg PO BID while using narcotics  - Zofran PRN nausea due to narcotics   - Pain assessments [id number]    # Hx of atrial fibrillation  - Continue home Flecainide 50mg BID     Prophylaxis:   - DVT: SQ heparin [id number], SCDs, encourage ambulation   - Encourage IS x10 every hour while awake     FEN/GI:  - Monitor/replete electrolytes PRN   - IVF discontinue when PO intake adequate   - Continue Regular diet   - GI protection with Protonix 40mg daily   - Bowel regimen: Scheduled Miralax and pericolace daily     Medically ready for dispo with improved pain tolerance

## 2025-07-06 ENCOUNTER — HOME HEALTH ADMISSION (OUTPATIENT)
Dept: HOME HEALTH SERVICES | Facility: HOME HEALTH | Age: 70
End: 2025-07-06
Payer: MEDICARE

## 2025-07-06 LAB
ANION GAP SERPL CALC-SCNC: 11 MMOL/L (ref 10–20)
BUN SERPL-MCNC: 8 MG/DL (ref 6–23)
CALCIUM SERPL-MCNC: 8.2 MG/DL (ref 8.6–10.6)
CHLORIDE SERPL-SCNC: 101 MMOL/L (ref 98–107)
CO2 SERPL-SCNC: 28 MMOL/L (ref 21–32)
CREAT SERPL-MCNC: 0.83 MG/DL (ref 0.5–1.05)
EGFRCR SERPLBLD CKD-EPI 2021: 75 ML/MIN/1.73M*2
ERYTHROCYTE [DISTWIDTH] IN BLOOD BY AUTOMATED COUNT: 13.5 % (ref 11.5–14.5)
GLUCOSE SERPL-MCNC: 98 MG/DL (ref 74–99)
HCT VFR BLD AUTO: 29.5 % (ref 36–46)
HGB BLD-MCNC: 8.6 G/DL (ref 12–16)
MCH RBC QN AUTO: 28 PG (ref 26–34)
MCHC RBC AUTO-ENTMCNC: 29.2 G/DL (ref 32–36)
MCV RBC AUTO: 96 FL (ref 80–100)
NRBC BLD-RTO: 0 /100 WBCS (ref 0–0)
PLATELET # BLD AUTO: 214 X10*3/UL (ref 150–450)
POTASSIUM SERPL-SCNC: 4 MMOL/L (ref 3.5–5.3)
RBC # BLD AUTO: 3.07 X10*6/UL (ref 4–5.2)
SODIUM SERPL-SCNC: 136 MMOL/L (ref 136–145)
WBC # BLD AUTO: 6.9 X10*3/UL (ref 4.4–11.3)

## 2025-07-06 PROCEDURE — 2500000004 HC RX 250 GENERAL PHARMACY W/ HCPCS (ALT 636 FOR OP/ED): Performed by: STUDENT IN AN ORGANIZED HEALTH CARE EDUCATION/TRAINING PROGRAM

## 2025-07-06 PROCEDURE — 2500000001 HC RX 250 WO HCPCS SELF ADMINISTERED DRUGS (ALT 637 FOR MEDICARE OP): Performed by: PHYSICIAN ASSISTANT

## 2025-07-06 PROCEDURE — 85027 COMPLETE CBC AUTOMATED: CPT

## 2025-07-06 PROCEDURE — 80048 BASIC METABOLIC PNL TOTAL CA: CPT

## 2025-07-06 PROCEDURE — 1100000001 HC PRIVATE ROOM DAILY

## 2025-07-06 PROCEDURE — 2500000001 HC RX 250 WO HCPCS SELF ADMINISTERED DRUGS (ALT 637 FOR MEDICARE OP): Performed by: STUDENT IN AN ORGANIZED HEALTH CARE EDUCATION/TRAINING PROGRAM

## 2025-07-06 PROCEDURE — 2500000004 HC RX 250 GENERAL PHARMACY W/ HCPCS (ALT 636 FOR OP/ED): Performed by: PHYSICIAN ASSISTANT

## 2025-07-06 PROCEDURE — 36415 COLL VENOUS BLD VENIPUNCTURE: CPT

## 2025-07-06 RX ADMIN — CITALOPRAM HYDROBROMIDE 40 MG: 40 TABLET ORAL at 08:37

## 2025-07-06 RX ADMIN — HEPARIN SODIUM 5000 UNITS: 5000 INJECTION, SOLUTION INTRAVENOUS; SUBCUTANEOUS at 15:51

## 2025-07-06 RX ADMIN — ACETAMINOPHEN 650 MG: 325 TABLET, FILM COATED ORAL at 23:48

## 2025-07-06 RX ADMIN — OXYCODONE HYDROCHLORIDE 5 MG: 5 TABLET ORAL at 20:44

## 2025-07-06 RX ADMIN — OXYCODONE HYDROCHLORIDE 10 MG: 10 TABLET ORAL at 08:36

## 2025-07-06 RX ADMIN — HEPARIN SODIUM 5000 UNITS: 5000 INJECTION, SOLUTION INTRAVENOUS; SUBCUTANEOUS at 23:48

## 2025-07-06 RX ADMIN — ACETAMINOPHEN 650 MG: 325 TABLET, FILM COATED ORAL at 18:12

## 2025-07-06 RX ADMIN — ACETAMINOPHEN 650 MG: 325 TABLET, FILM COATED ORAL at 06:00

## 2025-07-06 RX ADMIN — MAGNESIUM OXIDE TAB 400 MG (241.3 MG ELEMENTAL MG) 1 TABLET: 400 (241.3 MG) TAB at 08:37

## 2025-07-06 RX ADMIN — HEPARIN SODIUM 5000 UNITS: 5000 INJECTION, SOLUTION INTRAVENOUS; SUBCUTANEOUS at 08:36

## 2025-07-06 RX ADMIN — CYCLOBENZAPRINE 10 MG: 10 TABLET, FILM COATED ORAL at 20:37

## 2025-07-06 RX ADMIN — VITAM B12 500 MCG: 100 TAB at 08:37

## 2025-07-06 RX ADMIN — OXYCODONE HYDROCHLORIDE 10 MG: 10 TABLET ORAL at 12:39

## 2025-07-06 RX ADMIN — OXYCODONE HYDROCHLORIDE 5 MG: 5 TABLET ORAL at 00:30

## 2025-07-06 RX ADMIN — ACETAMINOPHEN 650 MG: 325 TABLET, FILM COATED ORAL at 12:02

## 2025-07-06 RX ADMIN — OXYCODONE HYDROCHLORIDE 10 MG: 10 TABLET ORAL at 04:28

## 2025-07-06 RX ADMIN — Medication 25 MCG: at 08:37

## 2025-07-06 RX ADMIN — PANTOPRAZOLE SODIUM 40 MG: 40 TABLET, DELAYED RELEASE ORAL at 06:00

## 2025-07-06 RX ADMIN — CYCLOBENZAPRINE 10 MG: 10 TABLET, FILM COATED ORAL at 15:51

## 2025-07-06 RX ADMIN — PREGABALIN 100 MG: 50 CAPSULE ORAL at 08:36

## 2025-07-06 RX ADMIN — CYCLOBENZAPRINE 10 MG: 10 TABLET, FILM COATED ORAL at 08:36

## 2025-07-06 RX ADMIN — FLECAINIDE ACETATE 50 MG: 50 TABLET ORAL at 21:39

## 2025-07-06 RX ADMIN — SENNOSIDES AND DOCUSATE SODIUM 2 TABLET: 50; 8.6 TABLET ORAL at 08:37

## 2025-07-06 ASSESSMENT — PAIN SCALES - GENERAL
PAINLEVEL_OUTOF10: 7
PAINLEVEL_OUTOF10: 8
PAINLEVEL_OUTOF10: 4
PAINLEVEL_OUTOF10: 5 - MODERATE PAIN
PAINLEVEL_OUTOF10: 2
PAINLEVEL_OUTOF10: 6
PAINLEVEL_OUTOF10: 8

## 2025-07-06 ASSESSMENT — PAIN - FUNCTIONAL ASSESSMENT
PAIN_FUNCTIONAL_ASSESSMENT: 0-10

## 2025-07-06 ASSESSMENT — COGNITIVE AND FUNCTIONAL STATUS - GENERAL
CLIMB 3 TO 5 STEPS WITH RAILING: A LOT
WALKING IN HOSPITAL ROOM: A LITTLE
STANDING UP FROM CHAIR USING ARMS: A LITTLE
MOVING TO AND FROM BED TO CHAIR: A LITTLE
MOBILITY SCORE: 19

## 2025-07-06 ASSESSMENT — PAIN DESCRIPTION - LOCATION
LOCATION: BACK

## 2025-07-06 NOTE — CARE PLAN
The patient's goals for the shift include rest and pain management     The clinical goals for the shift include Patient will remain safe and free from injury overnight.    Over the shift, the patient had a stable exam and was safe and free from injury. Patient able to sleep after receiving pain medication. No acute events overnight.  Problem: Pain - Adult  Goal: Verbalizes/displays adequate comfort level or baseline comfort level  Outcome: Progressing     Problem: Safety - Adult  Goal: Free from fall injury  Outcome: Progressing     Problem: Discharge Planning  Goal: Discharge to home or other facility with appropriate resources  Outcome: Progressing     Problem: Chronic Conditions and Co-morbidities  Goal: Patient's chronic conditions and co-morbidity symptoms are monitored and maintained or improved  Outcome: Progressing     Problem: Nutrition  Goal: Nutrient intake appropriate for maintaining nutritional needs  Outcome: Progressing

## 2025-07-06 NOTE — CARE PLAN
The patient's goals for the shift include      The clinical goals for the shift include Pt pain will be controlled during shift.    Over the shift, the patient had better pain control during the shift. Pt able to rest and states pain is well controlled.

## 2025-07-06 NOTE — PROGRESS NOTES
"    Department of Neurosurgery  Daily Progress Note    Patient Name: Lanie Pablo  MRN: [id number]  Date:  07/06/25     Subjective  No overnight events noted. Patient still in pain, minimally improved. Patient desires to go to a rehab facility, is ok with [facility], though [facility] is preferred. Patient had a bowel movement, has been up and walking.    Objective    Vital Signs  BP 98/61 (BP Location: Right arm, Patient Position: Lying) Comment: nurse notified  Pulse 63   Temp 35.9 °C (96.7 °F) (Temporal)   Resp 16   Ht 1.626 m (5' 4\")   Wt 86.6 kg (190 lb 14.7 oz)   SpO2 94%   BMI 32.77 kg/m²      Physical Exam  Constitutional: A&Ox3, calm and cooperative, NAD.  Eyes: PERRL, EOMI.   ENMT: Moist mucous membranes, no apparent injuries or lesions.  Cardiovascular: Normal rate and regular rhythm. 2+ equal pulses of the distal extremities.  Respiratory/Thorax: CTAB, regular respirations on RA. Good symmetric chest expansion.   Gastrointestinal: Abdomen soft, non tender.   Genitourinary: Wild    Neurological:   A&Ox3  Face symmetric, Facial SILT   Tongue midline and symmetric  RUE D5 / B5 / T5 / HG 5/ IO 5  LUE D5 / B5 / T5 / HG 5/ IO 5  RLE HF5 / KE 5/ DF 5/ PF 5  LLE HF5 / KE 5/ DF 5/ PF 5  No clonus, neg Gutierrez    Psychological: Appropriate mood and behavior.   Skin: Warm and dry. Incision c/d/i    Diagnostics   Results for orders placed or performed during the hospital encounter of 07/01/25 (from the past 24 hours)   Basic metabolic panel   Result Value Ref Range    Glucose 98 74 - 99 mg/dL    Sodium 136 136 - 145 mmol/L    Potassium 4.0 3.5 - 5.3 mmol/L    Chloride 101 98 - 107 mmol/L    Bicarbonate 28 21 - 32 mmol/L    Anion Gap 11 10 - 20 mmol/L    Urea Nitrogen 8 6 - 23 mg/dL    Creatinine 0.83 0.50 - 1.05 mg/dL    eGFR 75 >60 mL/min/1.73m*2    Calcium 8.2 (L) 8.6 - 10.6 mg/dL   CBC   Result Value Ref Range    WBC 6.9 4.4 - 11.3 x10*3/uL    nRBC 0.0 0.0 - 0.0 /100 WBCs    RBC 3.07 (L) 4.00 - 5.20 x10*6/uL    " Hemoglobin 8.6 (L) 12.0 - 16.0 g/dL    Hematocrit 29.5 (L) 36.0 - 46.0 %    MCV 96 80 - 100 fL    MCH 28.0 26.0 - 34.0 pg    MCHC 29.2 (L) 32.0 - 36.0 g/dL    RDW 13.5 11.5 - 14.5 %    Platelets 214 150 - 450 x10*3/uL       Assessment/Plan  Lanie Pablo is a 70 y.o. female with a past medical history includes depression, HLD, artial fibrillation, orthostatic hypotension, vasovagal syncope, CHF, Asthma, CKD3,  Iron deficiency anemia, B12 deficiency, GERD, s/p gastric sleeve 2005, bowel incontinence, Chronic bilateral low back pain with bilateral sciatica, DDD and OA,. The patient is currently scheduled for Exploration of Prior T10-Pelvis Fusion with revision of amanda fracture at L3-L4 and S1 Fusion bilaterally as well as revision posterolateral arthrodesis L5-S1  on 07/01/2025  secondary to Chronic bilateral low back pain with bilateral sciatica.     7/2 EOS with good alignment and hardware in posn    # S/p Exploration of Prior T10-Pelvis Fusion with revision of amanda fracture at L3-L4 and S1 Fusion   - Monitor VS/pulse ox [id number]   - Monitor AM CBC/RFP  -- Continue drain care per nursing       · Empty and record output at least every 8 hours       · Strip drains TID and PRN to avoid drain obstruction    - Will continue the drains in place      # Acute postoperative pain  - Well controlled per patient, continue current regimen       ·  Tylenol 650mg PO [id number]        ·  Oxycodone 2.5/5/10mg PO [id number] PRN mild/mod/severe pain        ·  Dilaudid 0.2mg IV [id number] PRN breakthrough pain   - Bowel regimen with Colace 100 mg PO BID while using narcotics  - Zofran PRN nausea due to narcotics   - Pain assessments [id number]    # Hx of atrial fibrillation  - Continue home Flecainide 50mg BID     Prophylaxis:   - DVT: SQ heparin A8lnysz, SCDs, encourage ambulation   - Encourage IS x10 every hour while awake     FEN/GI:  - Monitor/replete electrolytes PRN   - IVF discontinue when PO intake adequate   - Continue Regular diet   -  GI protection with Protonix 40mg daily   - Bowel regimen: Scheduled Miralax and pericolace daily     Medically ready for dispo with improved pain tolerance

## 2025-07-07 ENCOUNTER — DOCUMENTATION (OUTPATIENT)
Dept: HOME HEALTH SERVICES | Facility: HOME HEALTH | Age: 70
End: 2025-07-07
Payer: COMMERCIAL

## 2025-07-07 VITALS
BODY MASS INDEX: 32.59 KG/M2 | RESPIRATION RATE: 16 BRPM | SYSTOLIC BLOOD PRESSURE: 116 MMHG | HEIGHT: 64 IN | WEIGHT: 190.92 LBS | DIASTOLIC BLOOD PRESSURE: 68 MMHG | HEART RATE: 72 BPM | OXYGEN SATURATION: 96 % | TEMPERATURE: 97.5 F

## 2025-07-07 LAB
ALBUMIN SERPL BCP-MCNC: 3.2 G/DL (ref 3.4–5)
ANION GAP SERPL CALC-SCNC: 13 MMOL/L (ref 10–20)
BUN SERPL-MCNC: 10 MG/DL (ref 6–23)
CALCIUM SERPL-MCNC: 8.6 MG/DL (ref 8.6–10.6)
CHLORIDE SERPL-SCNC: 101 MMOL/L (ref 98–107)
CO2 SERPL-SCNC: 28 MMOL/L (ref 21–32)
CREAT SERPL-MCNC: 0.92 MG/DL (ref 0.5–1.05)
EGFRCR SERPLBLD CKD-EPI 2021: 66 ML/MIN/1.73M*2
ERYTHROCYTE [DISTWIDTH] IN BLOOD BY AUTOMATED COUNT: 13.4 % (ref 11.5–14.5)
GLUCOSE SERPL-MCNC: 98 MG/DL (ref 74–99)
HCT VFR BLD AUTO: 26.5 % (ref 36–46)
HGB BLD-MCNC: 8.8 G/DL (ref 12–16)
MCH RBC QN AUTO: 28.9 PG (ref 26–34)
MCHC RBC AUTO-ENTMCNC: 33.2 G/DL (ref 32–36)
MCV RBC AUTO: 87 FL (ref 80–100)
NRBC BLD-RTO: 0 /100 WBCS (ref 0–0)
PHOSPHATE SERPL-MCNC: 3.6 MG/DL (ref 2.5–4.9)
PLATELET # BLD AUTO: 244 X10*3/UL (ref 150–450)
POTASSIUM SERPL-SCNC: 4.1 MMOL/L (ref 3.5–5.3)
RBC # BLD AUTO: 3.05 X10*6/UL (ref 4–5.2)
SODIUM SERPL-SCNC: 138 MMOL/L (ref 136–145)
WBC # BLD AUTO: 6.8 X10*3/UL (ref 4.4–11.3)

## 2025-07-07 PROCEDURE — 1100000001 HC PRIVATE ROOM DAILY

## 2025-07-07 PROCEDURE — 97530 THERAPEUTIC ACTIVITIES: CPT | Mod: GO

## 2025-07-07 PROCEDURE — 2500000004 HC RX 250 GENERAL PHARMACY W/ HCPCS (ALT 636 FOR OP/ED): Performed by: PHYSICIAN ASSISTANT

## 2025-07-07 PROCEDURE — 2500000001 HC RX 250 WO HCPCS SELF ADMINISTERED DRUGS (ALT 637 FOR MEDICARE OP): Performed by: STUDENT IN AN ORGANIZED HEALTH CARE EDUCATION/TRAINING PROGRAM

## 2025-07-07 PROCEDURE — 2500000004 HC RX 250 GENERAL PHARMACY W/ HCPCS (ALT 636 FOR OP/ED): Performed by: STUDENT IN AN ORGANIZED HEALTH CARE EDUCATION/TRAINING PROGRAM

## 2025-07-07 PROCEDURE — 80069 RENAL FUNCTION PANEL: CPT | Performed by: NURSE PRACTITIONER

## 2025-07-07 PROCEDURE — 97116 GAIT TRAINING THERAPY: CPT | Mod: GP

## 2025-07-07 PROCEDURE — 36415 COLL VENOUS BLD VENIPUNCTURE: CPT | Performed by: NURSE PRACTITIONER

## 2025-07-07 PROCEDURE — 97530 THERAPEUTIC ACTIVITIES: CPT | Mod: GP

## 2025-07-07 PROCEDURE — 85027 COMPLETE CBC AUTOMATED: CPT | Performed by: NURSE PRACTITIONER

## 2025-07-07 PROCEDURE — 2500000001 HC RX 250 WO HCPCS SELF ADMINISTERED DRUGS (ALT 637 FOR MEDICARE OP): Performed by: PHYSICIAN ASSISTANT

## 2025-07-07 RX ORDER — CYCLOBENZAPRINE HCL 10 MG
10 TABLET ORAL 3 TIMES DAILY PRN
Status: DISCONTINUED | OUTPATIENT
Start: 2025-07-07 | End: 2025-07-08 | Stop reason: HOSPADM

## 2025-07-07 RX ADMIN — OXYCODONE HYDROCHLORIDE 5 MG: 5 TABLET ORAL at 08:20

## 2025-07-07 RX ADMIN — FLECAINIDE ACETATE 50 MG: 50 TABLET ORAL at 21:20

## 2025-07-07 RX ADMIN — HEPARIN SODIUM 5000 UNITS: 5000 INJECTION INTRAVENOUS; SUBCUTANEOUS at 23:49

## 2025-07-07 RX ADMIN — OXYCODONE HYDROCHLORIDE 10 MG: 10 TABLET ORAL at 12:31

## 2025-07-07 RX ADMIN — FLECAINIDE ACETATE 50 MG: 50 TABLET ORAL at 08:20

## 2025-07-07 RX ADMIN — ACETAMINOPHEN 650 MG: 325 TABLET, FILM COATED ORAL at 17:49

## 2025-07-07 RX ADMIN — VITAM B12 500 MCG: 100 TAB at 08:19

## 2025-07-07 RX ADMIN — HEPARIN SODIUM 5000 UNITS: 5000 INJECTION INTRAVENOUS; SUBCUTANEOUS at 15:04

## 2025-07-07 RX ADMIN — ACETAMINOPHEN 650 MG: 325 TABLET, FILM COATED ORAL at 12:31

## 2025-07-07 RX ADMIN — OXYCODONE HYDROCHLORIDE 10 MG: 10 TABLET ORAL at 03:58

## 2025-07-07 RX ADMIN — OXYCODONE HYDROCHLORIDE 10 MG: 10 TABLET ORAL at 22:45

## 2025-07-07 RX ADMIN — PANTOPRAZOLE SODIUM 40 MG: 40 TABLET, DELAYED RELEASE ORAL at 05:28

## 2025-07-07 RX ADMIN — HEPARIN SODIUM 5000 UNITS: 5000 INJECTION INTRAVENOUS; SUBCUTANEOUS at 08:20

## 2025-07-07 RX ADMIN — PREGABALIN 100 MG: 50 CAPSULE ORAL at 08:20

## 2025-07-07 RX ADMIN — CYCLOBENZAPRINE 10 MG: 10 TABLET, FILM COATED ORAL at 08:20

## 2025-07-07 RX ADMIN — Medication 25 MCG: at 08:20

## 2025-07-07 RX ADMIN — SENNOSIDES AND DOCUSATE SODIUM 2 TABLET: 50; 8.6 TABLET ORAL at 08:20

## 2025-07-07 RX ADMIN — ACETAMINOPHEN 650 MG: 325 TABLET, FILM COATED ORAL at 23:49

## 2025-07-07 RX ADMIN — CYCLOBENZAPRINE 10 MG: 10 TABLET, FILM COATED ORAL at 15:03

## 2025-07-07 RX ADMIN — SENNOSIDES AND DOCUSATE SODIUM 2 TABLET: 50; 8.6 TABLET ORAL at 21:20

## 2025-07-07 RX ADMIN — ACETAMINOPHEN 650 MG: 325 TABLET, FILM COATED ORAL at 05:28

## 2025-07-07 RX ADMIN — MAGNESIUM OXIDE TAB 400 MG (241.3 MG ELEMENTAL MG) 1 TABLET: 400 (241.3 MG) TAB at 08:20

## 2025-07-07 RX ADMIN — CITALOPRAM HYDROBROMIDE 40 MG: 40 TABLET ORAL at 08:20

## 2025-07-07 ASSESSMENT — COGNITIVE AND FUNCTIONAL STATUS - GENERAL
EATING MEALS: A LITTLE
HELP NEEDED FOR BATHING: A LOT
TURNING FROM BACK TO SIDE WHILE IN FLAT BAD: A LITTLE
CLIMB 3 TO 5 STEPS WITH RAILING: A LOT
STANDING UP FROM CHAIR USING ARMS: A LITTLE
WALKING IN HOSPITAL ROOM: A LITTLE
WALKING IN HOSPITAL ROOM: A LITTLE
CLIMB 3 TO 5 STEPS WITH RAILING: TOTAL
DRESSING REGULAR LOWER BODY CLOTHING: A LOT
PERSONAL GROOMING: A LITTLE
MOBILITY SCORE: 16
MOBILITY SCORE: 19
DAILY ACTIVITIY SCORE: 16
MOVING TO AND FROM BED TO CHAIR: A LITTLE
STANDING UP FROM CHAIR USING ARMS: A LITTLE
DRESSING REGULAR UPPER BODY CLOTHING: A LITTLE
MOVING TO AND FROM BED TO CHAIR: A LITTLE
TOILETING: A LITTLE
MOVING FROM LYING ON BACK TO SITTING ON SIDE OF FLAT BED WITH BEDRAILS: A LITTLE

## 2025-07-07 ASSESSMENT — PAIN SCALES - GENERAL
PAINLEVEL_OUTOF10: 5 - MODERATE PAIN
PAINLEVEL_OUTOF10: 8
PAINLEVEL_OUTOF10: 9
PAINLEVEL_OUTOF10: 4
PAINLEVEL_OUTOF10: 9

## 2025-07-07 ASSESSMENT — PAIN - FUNCTIONAL ASSESSMENT
PAIN_FUNCTIONAL_ASSESSMENT: 0-10

## 2025-07-07 ASSESSMENT — PAIN DESCRIPTION - LOCATION
LOCATION: BACK

## 2025-07-07 NOTE — CARE PLAN
The patient's goals for the shift include rest and pain management     The clinical goals for the shift include Patient will remain safe and free from injury overnight.    Over the shift, the patient was safe and had a stable exam. Patient able to sleep after receiving pain medication.  Problem: Pain - Adult  Goal: Verbalizes/displays adequate comfort level or baseline comfort level  Outcome: Progressing     Problem: Safety - Adult  Goal: Free from fall injury  Outcome: Progressing     Problem: Discharge Planning  Goal: Discharge to home or other facility with appropriate resources  Outcome: Progressing     Problem: Chronic Conditions and Co-morbidities  Goal: Patient's chronic conditions and co-morbidity symptoms are monitored and maintained or improved  Outcome: Progressing     Problem: Nutrition  Goal: Nutrient intake appropriate for maintaining nutritional needs  Outcome: Progressing

## 2025-07-07 NOTE — CARE PLAN
Transitional Care Coordinator Note: Patient discussed with medical team, per medical team patient is medically ready.   Discharge dispo: Patient will now like to go to [facility], after she Declined Prior to the Medical Team and TCC. [facility] will update this Morning if they have a bed. ADOD 0-1 Days.  Radha Beltran RN  Transitional Care Coordinator

## 2025-07-07 NOTE — HH CARE COORDINATION
Home Care received a referral for Physical Therapy and Occupational Therapy. Unfortunately, we are unable to accept and process the referral at this time.    Reason:  Patient is discharging to a Post-Acute Care Facility    Patients, please reach out to the referring provider or your PCP to assist in obtaining an alternative home care agency and/or guidance to meet your needs.    Providers, please reach out to  Home Care at [phone] with any questions regarding the declined referral.

## 2025-07-07 NOTE — DOCUMENTATION CLARIFICATION NOTE
"    PATIENT:               NAZ BOLIVAR  ACCT #:                  [id number]  MRN:                       [id number]  :                       1955  ADMIT DATE:       2025 5:41 AM  DISCH DATE:  RESPONDING PROVIDER #:        [id number]          PROVIDER RESPONSE TEXT:    Lumbago with sciatica diagnosis is related to or is a complication of the Prior T10-Pelvis Fusion procedure    CDI QUERY TEXT:    Clarification    Instruction:    Based on your assessment of the patient and the clinical information, please provide the requested documentation by clicking on the appropriate radio button and enter any additional information if prompted.    Question: Please further clarify if a relationship exists between the Lumbago with sciatica and Prior T10-Pelvis Fusion    When answering this query, please exercise your independent professional judgment. The fact that a question is being asked, does not imply that any particular answer is desired or expected.    The patient's clinical indicators include:  Clinical Information: 71 y/o female with lumbar and radicular low back pain with bilateral sciatica.    Clinical Indicators:  HH&P  - \"scheduled for Exploration of Prior T10-Pelvis Fusion with revision of amanda fracture at L3-L4 and S1 Fusion bilaterally as well as revision posterolateral arthrodesis L5-S1... 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    Treatment:  Surgery (Exploration of Prior T10-Pelvis Fusion with revision of amanda fracture at L3-L4 and S1 Fusion bilaterally as well as revision posterolateral arthrodesis L5-S1) on   Acetaminophen (Ofirmev) injection 1000 mg IV   Dexamethasone (Decadron) injection 8 mg IV   HYDROmorphone (Dilaudid) injection 0.25 mg IV  as needed    Risk Factors: Prior T10-Pelvis Fusion  Options provided:  -- Lumbago with " sciatica diagnosis is related to or is a complication of the Prior T10-Pelvis Fusion procedure  -- Lumbago with sciatica diagnosis is unrelated to Prior T10-Pelvis Fusion  -- Other - I will add my own diagnosis  -- Refer to Clinical Documentation Reviewer    Query created by: Landry Palumbo on 7/2/2025 1:47 PM      Electronically signed by:  BARBIE DAS 7/7/2025 1:17 PM

## 2025-07-07 NOTE — PROGRESS NOTES
"  Department of Neurosurgery  Daily Progress Note    Patient Name: Lanie Pablo  MRN: [id number]  Date:  07/07/25     Subjective  No overnight events noted. Patient states incisional back pain 5-6/10. Patient denies preop leg pain improved postop. Patient states BM and voiding well. Patient states ambulating to bathroom with walker and feels she is doing well.     Objective    Vital Signs  /58 (Patient Position: Sitting)   Pulse 63   Temp 36.4 °C (97.5 °F)   Resp 16   Ht 1.626 m (5' 4\")   Wt 86.6 kg (190 lb 14.7 oz)   SpO2 93%   BMI 32.77 kg/m²      Physical Exam  Constitutional: A&Ox3, calm and cooperative, NAD.  Eyes: PERRL, EOMI.   ENMT: Moist mucous membranes, no apparent injuries or lesions.  Cardiovascular: Normal rate and regular rhythm. 2+ equal pulses of the distal extremities.  Respiratory/Thorax: CTAB, regular respirations on RA. Good symmetric chest expansion.   Gastrointestinal: Abdomen soft, non tender.   Genitourinary: voiding   Neurological:   A&Ox3  Face symmetric, Facial SILT   Tongue midline and symmetric  RUE D5 / B5 / T5 / HG 5/ IO 5  LUE D5 / B5 / T5 / HG 5/ IO 5  RLE HF5 / KE 5/ DF 5/ PF 5  LLE HF5 / KE 5/ DF 5/ PF 5  No clonus, neg Gutierrez    Psychological: Appropriate mood and behavior.   Skin: Warm and dry. Incision c/d/I  Drain #1 output- 0cc/8hrs  Drain #2 output- 20cc/8hrs  Drain #3 output- 10cc/8hrs      Imaging  XR EOS full body EAM through ankle 2 views  Result Date: 7/2/2025  Postsurgical change status post thoracolumbar fusion without hardware failure.     MACRO: None   Signed by: Azeem Mast 7/2/2025 10:27 AM Dictation workstation:   [id number]      Assessment/Plan  Lanie Pablo is a 70 y.o. female with a past medical history includes depression, HLD, artial fibrillation, orthostatic hypotension, vasovagal syncope, CHF, Asthma, CKD3,  Iron deficiency anemia, B12 deficiency, GERD, s/p gastric sleeve 2005, bowel incontinence, Chronic bilateral low back pain " with bilateral sciatica, DDD and OA,. The patient is currently scheduled for Exploration of Prior T10-Pelvis Fusion with revision of amanda fracture at L3-L4 and S1 Fusion bilaterally as well as revision posterolateral arthrodesis L5-S1  on 07/01/2025  secondary to Chronic bilateral low back pain with bilateral sciatica. .     # S/p Exploration of Prior T10-Pelvis Fusion with revision of amanda fracture at L3-L4 and S1 Fusion bilaterally as well as revision posterolateral arthrodesis L5-S1  - Postop xray comleted  - Monitor VS/pulse ox [id number]   - Monitor AM CBC/RFP  -Neuro check q4hrs  -Discontinue prevena  - Continue drain care per nursing       · Empty and record output at least every 8 hours       · Strip drains TID and PRN to avoid drain obstruction  -PT/OT    # Acute postoperative pain  - Well controlled per patient, continue current regimen       ·  Tylenol 650mg PO [id number]        ·  Oxycodone 2.5/5/10mg PO Y1hdcds PRN mild/mod/severe pain        ·  Dilaudid 0.2mg IV T8earec PRN breakthrough pain   - Bowel regimen with Colace 100 mg PO BID while using narcotics  - Zofran PRN nausea due to narcotics   - Pain assessments [id number]     # Hx of Afib  - Continue home flecainide 50mg BID  -Monitor and record HR [id number]     #Hx of Depression  -Continue patient's Celexa 40mg daily    Prophylaxis:   - DVT: SQ heparin X6xjblt, SCDs, encourage ambulation   - Encourage IS x10 every hour while awake     FEN/GI:  - Monitor/replete electrolytes PRN   - IVF D/C'd due to adequate PO intake   - Continue Regular diet   - GI protection with Protonix 40mg daily   - Bowel regimen: Scheduled Miralax and pericolace daily        Disposition: PT/OT plan for HC. Follow up appointment with neurosurgery scheduled for 7/21/2025 .     Patient and plan discussed with chief neurosurgery resident, Dr. Boyd, and Dr. Mike Arango, CNP   Neurologic Surgery   [place]  Team Pager #[phone]     Total face to face time spent with  patient/family of > 60 minutes, with >50% of the time spent discussing plan of care/management, counseling/educating on disease processes, explaining results of diagnostic testing.

## 2025-07-07 NOTE — PROGRESS NOTES
Physical Therapy    Physical Therapy Treatment    Patient Name: Lanie Pablo  MRN: [id number]  Department: [place] [address]  Room: 202/202A  Today's Date: 7/7/2025  Time Calculation  Start Time: 1440  Stop Time: 1505  Time Calculation (min): 25 min    Assessment/Plan   PT Assessment  Barriers to Discharge Home: Caregiver assistance, Physical needs  Caregiver Assistance: Caregiver assistance needed per identified barriers - however, level of patient's required assistance exceeds assistance available at home  Physical Needs: Stair navigation into home limited by function/safety, Intermittent mobility assistance needed, High falls risk due to function or environment  Evaluation/Treatment Tolerance: Patient tolerated treatment well  Medical Staff Made Aware: Yes  End of Session Communication: Bedside nurse  Assessment Comment: Pt more alert this session.  Able to ambulate in hallway today for first time.  Remains appropriate for high intensity PT at time of d/c  End of Session Patient Position: Bed, 3 rail up, Alarm off, not on at start of session     PT Plan  Treatment/Interventions: Bed mobility, Transfer training, Gait training, Stair training, Balance training, Therapeutic exercise, Therapeutic activity  PT Plan: Ongoing PT  PT Frequency: Daily  PT Discharge Recommendations: High intensity level of continued care  PT Recommended Transfer Status: Assist x1, Assistive device  PT - OK to Discharge: Yes (Meaning pt has been evaluated and d/c recc is in place)    PT Visit Info:  PT Received On: 07/07/25  Response to Previous Treatment: Patient with no complaints from previous session.     General Visit Information:   General  Family/Caregiver Present: No  Prior to Session Communication: Bedside nurse  Patient Position Received: Bed, 3 rail up, Alarm off, not on at start of session  General Comment: Supine.  Pt pleasant, cooperative and agreeable to PT.  Pt requiring markedly increased time and breaks, but able to progress  today to ambulation in jen[place].  Tolerated well.    Subjective   Precautions:  Precautions  Medical Precautions: Fall precautions  Post-Surgical Precautions: Spinal precautions    Objective   Pain:  Pain Assessment  Pain Assessment:  (Pt reporting having received pain meds and pain being manageable for mobility)  Cognition:  Cognition  Overall Cognitive Status: Within Functional Limits  Arousal/Alertness: Appropriate responses to stimuli  Orientation Level: Oriented X4  Following Commands: Follows all commands and directions without difficulty    Activity Tolerance:  Activity Tolerance  Endurance: Endurance does not limit participation in activity  Treatments:    Bed Mobility 2  Bed Mobility  2: Supine to sitting, Sitting to supine, Log roll  Level of Assistance 2: Contact guard, Minimal verbal cues    Ambulation/Gait Training 1  Surface 1: Level tile, Carpet  Device 1: Rolling walker  Assistance 1: Minimum assistance, Minimal verbal cues, Minimal tactile cues  Quality of Gait 1: Wide base of support, Diminished heel strike, Decreased step length  Comments/Distance (ft) 1: 10 feet, 25 feet x4  Transfer 1  Transfer From 1: Sit to, Stand to  Transfer to 1: Sit  Transfer Device 1: Walker  Transfer Level of Assistance 1: Minimum assistance, Minimal verbal cues, Minimal tactile cues    Outcome Measures:    [facility] Basic Mobility  Turning from your back to your side while in a flat bed without using bedrails: A little  Moving from lying on your back to sitting on the side of a flat bed without using bedrails: A little  Moving to and from bed to chair (including a wheelchair): A little  Standing up from a chair using your arms (e.g. wheelchair or bedside chair): A little  To walk in hospital room: A little  Climbing 3-5 steps with railing: Total  Basic Mobility - Total Score: 16    [place]  Sitting Balance: Steady, safe  Arises: Able, uses arms to help  Attempts to Arise: Able, requires more than one attempt  Immediate Standing  Balance (First 5 Seconds): Steady but uses walker or other support  Standing Balance: Steady but wide stance, uses cane or other support  Nudged: Begins to fall  Eyes Closed: Unsteady  Turned 360 Degrees: Steadiness: Unsteady (Grabs, staggers)  Turned 360 Degrees: Continuity of Steps: Discontinuous steps  Sitting Down: Uses arms or not a smooth motion  Balance Score: 6  Initiation of Gait: No hesitancy  Step Height: R Swing Foot: Right foot complete clears floor  Step Length: R Swing Foot: Passes left stance foot  Step Height: L Swing Foot: Left foot complete clears floor  Step Length: L Swing Foot: Passes right stance foot  Step Symmetry: Right and left step appear equal  Step Continuity: Steps appear continuous  Path: Mild/moderate deviation or uses walking aid  Trunk: Marked sway or uses walking aid  Walking Time: Heels apart  Gait Score: 8  Total Score: 14    Education Documentation  Precautions, taught by Kehinde Garcia, PT at 7/7/2025  4:15 PM.  Learner: Patient  Readiness: Eager  Method: Explanation  Response: Verbalizes Understanding    Mobility Training, taught by Kehinde Garcia PT at 7/7/2025  4:15 PM.  Learner: Patient  Readiness: Eager  Method: Explanation  Response: Verbalizes Understanding    Education Comments  No comments found.    OP EDUCATION:       Encounter Problems       Encounter Problems (Active)       Mobility       Ambulate >150 feet, LRD, Ind  (Progressing)       Start:  07/02/25    Expected End:  07/16/25            Up/dn 4 stairs, Ind (Not Progressing)       Start:  07/02/25    Expected End:  07/16/25               PT Transfers       Pt able to perform bed mobility Ind from a flat bed without rail using Log Roll  (Progressing)       Start:  07/02/25    Expected End:  07/16/25            sit<>stand, bed<>chair, LRD, Ind  (Progressing)       Start:  07/02/25    Expected End:  07/16/25

## 2025-07-07 NOTE — DOCUMENTATION CLARIFICATION NOTE
"    PATIENT:               NAZ BOLIVAR  ACCT #:                  [id number]  MRN:                       [id number]  :                       1955  ADMIT DATE:       2025 5:41 AM  DISCH DATE:  RESPONDING PROVIDER #:        [id number]          PROVIDER RESPONSE TEXT:    Chronic Diastolic Congestive Heart Failure    CDI QUERY TEXT:    Clarification    Instruction:    Based on your assessment of the patient and the clinical information, please provide the requested documentation by clicking on the appropriate radio button and enter any additional information if prompted.    Question: Please further clarify the type and acuity of congestive heart failure    When answering this query, please exercise your independent professional judgment. The fact that a question is being asked, does not imply that any particular answer is desired or expected.    The patient's clinical indicators include:  Clinical Information: 71 y/o female scheduled for Exploration of Prior T10-Pelvis Fusion.    Clinical Indicators:   H&P - \"PMHX... CHF\", \"Echo: 23... Left ventricular systolic function is normal. EF = 67 plus or minus 5%\" \"Cardiovascular: CHF,- medicated with potassium, acebutolol (continue both) and follows with cardiology.\"    Treatment:  Follows with cardiology    Risk Factors: Age, Atrial Fibrillation  Options provided:  -- Chronic Diastolic Congestive Heart Failure  -- Other - I will add my own diagnosis  -- Refer to Clinical Documentation Reviewer    Query created by: Landry Palumbo on 2025 1:48 PM      Electronically signed by:  BARBIE DAS 2025 1:17 PM          "

## 2025-07-07 NOTE — PROGRESS NOTES
Occupational Therapy    Occupational Therapy Treatment    Name: Lanie Pablo  MRN: [id number]  : 1955  Date: 25  Room: [facility]      Time Calculation  Start Time: 953  Stop Time: 1011  Time Calculation (min): 18 min    Assessment:  OT Assessment: Pt presents with deficits in ADL's/functional mobility s/p recent extensive spine surgery. Pt demonstrates overall progress with independence in ADL's/moblity, but is somewhat limited by pain. Pt reports her spouse will be unable to assist her upon return to home. Continue to recommend high intensity OT to maximize pt's overall comfort, independence, and safety in all ADL's/mobility prior to return to home.  Prognosis: Good  Barriers to Discharge Home: Caregiver assistance, Physical needs  Physical Needs: 24hr mobility assistance needed, 24hr ADL assistance needed  Evaluation/Treatment Tolerance:  (Pt somewhat limited 2' c/o pain, however, able to tolerate all activities in session.)  End of Session Communication: Bedside nurse  End of Session Patient Position: Up in chair, Alarm off, not on at start of session (all needs within reach)  Plan:  Treatment Interventions: ADL retraining, Functional transfer training, Endurance training, Patient/family training, Equipment evaluation/education, Compensatory technique education, Continued evaluation  OT Frequency: 4 times per week (during this hospitalization)  OT Discharge Recommendations: High intensity level of continued care (Based on current functional status and rehab potential, patient is anticipated to tolerate and benefit from 5 or more days per week of skilled rehabilitative therapy after discharge from this acute inpatient hospitalization.)  Equipment Recommended upon Discharge:  (reacher, sock aid, LH shoe horn, LH sponge, raised toilet seat)  OT Recommended Transfer Status: Assist of 1 (with RW)  OT - OK to Discharge:  (OT Tx note completed.)    Subjective   General:  OT Last Visit  OT Received On:  "07/07/25  Reason for Referral: S/p Exploration of Prior T10-Pelvis Fusion with revision of amanda fracture at L3-L4 and S1 Fusion  Past Medical History Relevant to Rehab: Depression, HLD, artial fibrillation, CHF, Asthma, CKD3,  Iron deficiency anemia, GERD, s/p gastric sleeve 2005, Chronic bilateral low back pain with bilateral sciatica, DDD and OA  Prior to Session Communication: Bedside nurse  Patient Position Received: Bed, 3 rail up, Alarm off, not on at start of session  General Comment: Pt received in supine, agreeable to therapy, pleasant and cooperative.   Precautions:  Medical Precautions: Fall precautions  Post-Surgical Precautions: Spinal precautions  Precautions Comment: Hemovac x3  Vitals:    Lines/Tubes/Drains:  Closed/Suction Drain 1 Midline Back Accordion 10 Fr. (Active)   Number of days: 5       Closed/Suction Drain 2 Midline Back Accordion 10 Fr. (Active)   Number of days: 5       Closed/Suction Drain 3 Inferior;Midline Back Accordion 10 Fr. (Active)   Number of days: 5       Cognition:  Orientation Level: Oriented X4    Pain Assessment:  Pain Assessment  Pain Assessment: 0-10  0-10 (Numeric) Pain Score: 5 - Moderate pain (at rest; with mobility, pt c/o back pain at 7-8.)  Pain Type: Surgical pain  Pain Location: Back  Pain Interventions: Repositioned, Rest  Response to Interventions: Provider notified     Objective   Activities of Daily Living:       LE Dressing  LE Dressing Comments: D/W pt spine precautions in relation to LB dressing/mobility. Seated in B/S chair, pt able to utilize recommended \"figure 4\" technique to simulate donning/doffing socks with CGA/Min assist LLE. Pt unable to demo figure 4 with RLE in sitting 2' c/o increased pain during attempted task. Pt educated to using a reacher and sock aide for LB dressing via OT demonstration. Pt reports owning a reacher and is unsure if she still has a sock aide. Will continue to follow to maximize pt's independence and comfort in LB ADL's using " adaptive equipment.  Toileting  Toileting Comments: Pt declined at this time, states she recently used restroom with assistance from staff. Pt reports requiring assist to stand from toilet wihile using the grab bar.    Functional Standing Tolerance:  Functional Mobility  Functional Mobility Performed: Yes  Functional Mobility 1  Surface 1: Level tile, Carpet  Device 1: Rolling walker  Assistance 1: Contact guard, Minimal verbal cues, Minimal tactile cues  Comments 1: Pt performed short distance fxnl mobility in room on level tile as well as into carpeted hallway (approximately 25' total). Slow paced gait noted, required tactile cues for safe management of RW when turning.Pt with increased c/o back pain with all mobility.  Bed Mobility/Transfers:   Bed Mobility  Bed Mobility: Yes  Bed Mobility 1  Bed Mobility 1: Rolling left  Level of Assistance 1: Close supervision  Bed Mobility 2  Bed Mobility  2: Supine to sitting  Level of Assistance 2: Contact guard  Bed Mobility Comments 2: HOB slightly elevated. Pt with noted increased time to complete task as well as increased pt effort in task.  Transfer 1  Technique 1: Sit to stand, Stand to sit  Transfer Device 1: Walker  Transfer Level of Assistance 1: Minimum assistance, Minimal verbal cues, Minimal tactile cues  Toilet Transfers  Toilet Transfers Comments: Anticipate at least CGA/Min assist as observed during other fxnl tasks performed in session.             Balance:  Static Sitting Balance  Static Sitting-Level of Assistance: Close supervision  Static Sitting-Comment/Number of Minutes: Pt c/o dizziniess initiatlly upon coming to sitting, reported it resolved after seated EOB 1-2 minutes.  Static Standing Balance  Static Standing-Level of Assistance: Contact guard (with support of RW)      Outcome Measures:  [facility] Daily Activity  Putting on and taking off regular lower body clothing: A lot  Bathing (including washing, rinsing, drying): A lot  Putting on and taking off  regular upper body clothing: A little  Toileting, which includes using toilet, bedpan or urinal: A little  Taking care of personal grooming such as brushing teeth: A little  Eating Meals: A little  Daily Activity - Total Score: 16  OT Adult Other Outcome Measures  4AT: 0     Education Documentation  Body Mechanics, taught by Lucila Do OT at 7/7/2025 10:31 AM.  Learner: Patient  Readiness: Acceptance  Method: Explanation  Response: Needs Reinforcement    Precautions, taught by Lucila Do OT at 7/7/2025 10:31 AM.  Learner: Patient  Readiness: Acceptance  Method: Explanation  Response: Needs Reinforcement    ADL Training, taught by Lucila Do OT at 7/7/2025 10:31 AM.  Learner: Patient  Readiness: Acceptance  Method: Explanation  Response: Needs Reinforcement    Education Comments  No comments found.        Goals:  Encounter Problems       Encounter Problems (Active)       ADLs       Patient will perform UB and LB bathing  with set-up, supervision level of assistance and grab bars. (Progressing)       Start:  07/03/25    Expected End:  07/24/25            Patient with complete lower body dressing with set-up, stand by assist level of assistance donning and doffing all LE clothes  with PRN adaptive equipment while edge of bed  (Progressing)       Start:  07/03/25    Expected End:  07/24/25            Patient will complete toileting including hygiene clothing management/hygiene with supervision level of assistance and grab bars. (Progressing)       Start:  07/03/25    Expected End:  07/24/25               BALANCE       Pt will maintain dynamic standing balance during ADL task with stand by assist level of assistance in order to demonstrate decreased risk of falling and improved postural control. (Progressing)       Start:  07/03/25    Expected End:  07/24/25               TRANSFERS       Patient will perform bed mobility supervision, stand by assist level of assistance and bed rails in order to improve safety  and independence with mobility (Progressing)       Start:  07/03/25    Expected End:  07/24/25            Patient will complete sit to stand transfer with set-up, supervision level of assistance and least restrictive device in order to improve safety and prepare for out of bed mobility. (Progressing)       Start:  07/03/25    Expected End:  07/24/25 07/07/25 at 11:35 AM   Lucila Do, OT   [phone]

## 2025-07-08 VITALS
OXYGEN SATURATION: 95 % | DIASTOLIC BLOOD PRESSURE: 62 MMHG | RESPIRATION RATE: 17 BRPM | WEIGHT: 190.92 LBS | HEIGHT: 64 IN | HEART RATE: 66 BPM | SYSTOLIC BLOOD PRESSURE: 97 MMHG | TEMPERATURE: 97.3 F | BODY MASS INDEX: 32.59 KG/M2

## 2025-07-08 LAB
ALBUMIN SERPL BCP-MCNC: 3.5 G/DL (ref 3.4–5)
ANION GAP SERPL CALC-SCNC: 9 MMOL/L (ref 10–20)
BUN SERPL-MCNC: 9 MG/DL (ref 6–23)
CALCIUM SERPL-MCNC: 8.8 MG/DL (ref 8.6–10.6)
CHLORIDE SERPL-SCNC: 102 MMOL/L (ref 98–107)
CO2 SERPL-SCNC: 31 MMOL/L (ref 21–32)
CREAT SERPL-MCNC: 0.9 MG/DL (ref 0.5–1.05)
EGFRCR SERPLBLD CKD-EPI 2021: 68 ML/MIN/1.73M*2
ERYTHROCYTE [DISTWIDTH] IN BLOOD BY AUTOMATED COUNT: 13.7 % (ref 11.5–14.5)
GLUCOSE SERPL-MCNC: 139 MG/DL (ref 74–99)
HCT VFR BLD AUTO: 31.5 % (ref 36–46)
HGB BLD-MCNC: 9.5 G/DL (ref 12–16)
MCH RBC QN AUTO: 27.9 PG (ref 26–34)
MCHC RBC AUTO-ENTMCNC: 30.2 G/DL (ref 32–36)
MCV RBC AUTO: 93 FL (ref 80–100)
NRBC BLD-RTO: 0 /100 WBCS (ref 0–0)
PHOSPHATE SERPL-MCNC: 3.4 MG/DL (ref 2.5–4.9)
PLATELET # BLD AUTO: 264 X10*3/UL (ref 150–450)
POTASSIUM SERPL-SCNC: 3.8 MMOL/L (ref 3.5–5.3)
RBC # BLD AUTO: 3.4 X10*6/UL (ref 4–5.2)
SODIUM SERPL-SCNC: 138 MMOL/L (ref 136–145)
WBC # BLD AUTO: 6.3 X10*3/UL (ref 4.4–11.3)

## 2025-07-08 PROCEDURE — 2500000004 HC RX 250 GENERAL PHARMACY W/ HCPCS (ALT 636 FOR OP/ED): Performed by: PHYSICIAN ASSISTANT

## 2025-07-08 PROCEDURE — 80069 RENAL FUNCTION PANEL: CPT | Performed by: PHYSICIAN ASSISTANT

## 2025-07-08 PROCEDURE — 2500000004 HC RX 250 GENERAL PHARMACY W/ HCPCS (ALT 636 FOR OP/ED): Performed by: STUDENT IN AN ORGANIZED HEALTH CARE EDUCATION/TRAINING PROGRAM

## 2025-07-08 PROCEDURE — 85027 COMPLETE CBC AUTOMATED: CPT | Performed by: PHYSICIAN ASSISTANT

## 2025-07-08 PROCEDURE — 36415 COLL VENOUS BLD VENIPUNCTURE: CPT | Performed by: PHYSICIAN ASSISTANT

## 2025-07-08 PROCEDURE — 2500000001 HC RX 250 WO HCPCS SELF ADMINISTERED DRUGS (ALT 637 FOR MEDICARE OP): Performed by: STUDENT IN AN ORGANIZED HEALTH CARE EDUCATION/TRAINING PROGRAM

## 2025-07-08 PROCEDURE — 2500000001 HC RX 250 WO HCPCS SELF ADMINISTERED DRUGS (ALT 637 FOR MEDICARE OP): Performed by: PHYSICIAN ASSISTANT

## 2025-07-08 RX ADMIN — CITALOPRAM HYDROBROMIDE 40 MG: 40 TABLET ORAL at 08:11

## 2025-07-08 RX ADMIN — OXYCODONE HYDROCHLORIDE 10 MG: 10 TABLET ORAL at 03:01

## 2025-07-08 RX ADMIN — OXYCODONE HYDROCHLORIDE 10 MG: 10 TABLET ORAL at 10:12

## 2025-07-08 RX ADMIN — MAGNESIUM OXIDE TAB 400 MG (241.3 MG ELEMENTAL MG) 1 TABLET: 400 (241.3 MG) TAB at 08:11

## 2025-07-08 RX ADMIN — PANTOPRAZOLE SODIUM 40 MG: 40 TABLET, DELAYED RELEASE ORAL at 05:34

## 2025-07-08 RX ADMIN — SENNOSIDES AND DOCUSATE SODIUM 2 TABLET: 50; 8.6 TABLET ORAL at 08:11

## 2025-07-08 RX ADMIN — VITAM B12 500 MCG: 100 TAB at 08:11

## 2025-07-08 RX ADMIN — ACETAMINOPHEN 650 MG: 325 TABLET, FILM COATED ORAL at 05:35

## 2025-07-08 RX ADMIN — ACETAMINOPHEN 650 MG: 325 TABLET, FILM COATED ORAL at 11:50

## 2025-07-08 RX ADMIN — Medication 25 MCG: at 08:11

## 2025-07-08 RX ADMIN — HEPARIN SODIUM 5000 UNITS: 5000 INJECTION INTRAVENOUS; SUBCUTANEOUS at 08:21

## 2025-07-08 RX ADMIN — FLECAINIDE ACETATE 50 MG: 50 TABLET ORAL at 08:12

## 2025-07-08 RX ADMIN — PREGABALIN 100 MG: 50 CAPSULE ORAL at 08:16

## 2025-07-08 ASSESSMENT — PAIN DESCRIPTION - LOCATION
LOCATION: BACK
LOCATION: BACK

## 2025-07-08 ASSESSMENT — PAIN - FUNCTIONAL ASSESSMENT
PAIN_FUNCTIONAL_ASSESSMENT: 0-10

## 2025-07-08 ASSESSMENT — PAIN SCALES - GENERAL
PAINLEVEL_OUTOF10: 5 - MODERATE PAIN
PAINLEVEL_OUTOF10: 7
PAINLEVEL_OUTOF10: 8

## 2025-07-08 NOTE — CARE PLAN
Met with pt and introduced myself as care coordinator with Care Transitions Team for discharge planning. Pt is agreeable to discharge to [facility]. Pt is aware of the  of time of 1 pm with [facility]. Transport Slip given to the . MD is aware of Pt discharge along with  the Nurse. Pt is leaving [facility] with [facility]. Pt reported no safety concerns at home.  Pt's address, phone number, and contact information was verified.  Discharge Planning: Pt is discharging to [facility].

## 2025-07-08 NOTE — DISCHARGE SUMMARY
Discharge Diagnosis  Chronic bilateral low back pain with bilateral sciatica    Issues Requiring Follow-Up  Wound check follow up   Post op Neurosurgery follow up     Test Results Pending At Discharge  Pending Labs       Order Current Status    CBC In process    Renal Function Panel In process            Hospital Course  Lanie Pablo is a 70 y.o. female with a past medical history includes depression, HLD, artial fibrillation, orthostatic hypotension, vasovagal syncope, CHF, Asthma, CKD3,  Iron deficiency anemia, B12 deficiency, GERD, s/p gastric sleeve 2005, bowel incontinence, Chronic bilateral low back pain with bilateral sciatica, DDD and OA,. The patient is currently scheduled for Exploration of Prior T10-Pelvis Fusion with revision of amanda fracture at L3-L4 and S1 Fusion bilaterally as well as revision posterolateral arthrodesis L5-S1  on 07/01/2025  secondary to Chronic bilateral low back pain with bilateral sciatica.     7/2-Postop xray hardware intact.   7/7-Prevena removed.    7/8- All drains removed, dressing covered with telfa island    PT/OT evaluated patient and recommended low intensity    On the day of discharge, the patient was seen and evaluated by the neurosurgery team and deemed suitable for discharge. The patient was given detailed discharge instructions and were scheduled to follow up as an outpatient.    Visit Vitals  BP 97/62   Pulse 66   Temp 36.3 °C (97.3 °F) (Temporal)   Resp 17     Vitals:    07/01/25 0629   Weight: 86.6 kg (190 lb 14.7 oz)       Immunization History   Administered Date(s) Administered    Moderna SARS-CoV-2 Vaccination 03/09/2021, 04/08/2021, 12/21/2021       Results        Pertinent Physical Exam At Time of Discharge  Physical Exam  A&Ox3  Face symmetric, Facial SILT   Tongue midline and symmetric  RUE D5 / B5 / T5 / HG 5/ IO 5  LUE D5 / B5 / T5 / HG 5/ IO 5  RLE HF5 / KE 5/ DF 5/ PF 5  LLE HF5 / KE 5/ DF 5/ PF 5  No clonus, neg Gutierrez      Home Medications      Medication List      START taking these medications     acetaminophen 325 mg tablet; Commonly known as: Tylenol; Take 2 tablets   (650 mg) by mouth every 6 hours.   heparin (porcine) 5,000 unit/mL injection; Inject 1 mL (5,000 Units)   under the skin every 8 hours. Continue until approved ambulation.   ondansetron 4 mg tablet; Commonly known as: Zofran; Take 1 tablet (4 mg)   by mouth every 8 hours if needed for vomiting or nausea.   oxyCODONE 5 mg immediate release tablet; Commonly known as: Roxicodone;   Take 1 tablet (5 mg) by mouth every 6 hours if needed for severe pain (7 -   10).   polyethylene glycol 17 gram packet; Commonly known as: Glycolax,   Miralax; Take 17 g by mouth once daily.   sennosides-docusate sodium 8.6-50 mg tablet; Commonly known as:   Toya-Colace; Take 2 tablets by mouth 2 times a day.     CHANGE how you take these medications     diclofenac 75 mg EC tablet; Commonly known as: Voltaren; Take 1 tablet   (75 mg) by mouth 2 times a day. Do not use until discussed at follow-up   appointment with neurosurgery; What changed: additional instructions     CONTINUE taking these medications     biotin 1 mg capsule   Breo Ellipta 100-25 mcg/dose inhaler; Generic drug: fluticasone   furoate-vilanteroL   cholecalciferol 25 mcg (1,000 units) tablet; Commonly known as: Vitamin   D-3   citalopram 40 mg tablet; Commonly known as: CeleXA   cyanocobalamin 500 mcg tablet; Commonly known as: Vitamin B-12   cyclobenzaprine 10 mg tablet; Commonly known as: Flexeril   flecainide 50 mg tablet; Commonly known as: Tambocor   magnesium oxide 200 mg split tablet; Commonly known as: Mag-Ox   multivitamin tablet   pantoprazole 40 mg EC tablet; Commonly known as: ProtoNix   potassium gluconate 595 mg (99 mg) tablet   pregabalin 50 mg capsule; Commonly known as: Lyrica   traZODone 50 mg tablet; Commonly known as: Desyrel     STOP taking these medications     Colace 100 mg capsule; Generic drug: docusate sodium    oxyCODONE-acetaminophen 5-325 mg tablet; Commonly known as: Percocet       Outpatient Follow-Up  Future Appointments   Date Time Provider Department Center   7/21/2025 12:00 PM Giovanni Ahuja PA-C [id number] [facility]   9/15/2025 10:40 AM Mike Quigley MD [id number] [facility]       Ayo Orozco MD

## 2025-07-08 NOTE — PROGRESS NOTES
Neurosurgery Attending    DOS:7/1      Subjective  NAEON    Examination  intact  BUE 5/5  BLE 5/5    Assessment  H/o CHF, HLD, Afib, CKD3, asthma, orthostatic hypotension, vasovagal syncope, Iron deficiency anemia, GERD, s/p gastric sleeve 2005, bowel incontinence p/w back pain, b/l sciatica, gait instability, 7/1 s/p exploration of prior T10-pelvis, sacral and pelvic instrumentation, 7/2 EOS good alignment, hardware in pos'n      Plan  Tele, drainsx3 (dc on dispo), PTOT-rehab (precert)      Neurosurgery Medications  ABX: none  AED: none  DEX: none  DVT: SQH

## 2025-07-08 NOTE — CARE PLAN
The patient's goals for the shift include rest and pain management     The clinical goals for the shift include Patient will remain safe and free from injury overnight.    Over the shift, the patient had a stable exam and was safe and free from injury. No acute events overnight  Problem: Pain - Adult  Goal: Verbalizes/displays adequate comfort level or baseline comfort level  Outcome: Progressing     Problem: Safety - Adult  Goal: Free from fall injury  Outcome: Progressing     Problem: Discharge Planning  Goal: Discharge to home or other facility with appropriate resources  Outcome: Progressing     Problem: Chronic Conditions and Co-morbidities  Goal: Patient's chronic conditions and co-morbidity symptoms are monitored and maintained or improved  Outcome: Progressing     Problem: Nutrition  Goal: Nutrient intake appropriate for maintaining nutritional needs  Outcome: Progressing

## 2025-07-13 ENCOUNTER — APPOINTMENT (OUTPATIENT)
Dept: RADIOLOGY | Facility: HOSPITAL | Age: 70
End: 2025-07-13
Payer: MEDICARE

## 2025-07-13 ENCOUNTER — HOSPITAL ENCOUNTER (OUTPATIENT)
Facility: HOSPITAL | Age: 70
Setting detail: OBSERVATION
End: 2025-07-13
Attending: EMERGENCY MEDICINE | Admitting: NURSE PRACTITIONER
Payer: MEDICARE

## 2025-07-13 VITALS
OXYGEN SATURATION: 95 % | HEIGHT: 65 IN | HEART RATE: 60 BPM | WEIGHT: 190 LBS | RESPIRATION RATE: 16 BRPM | BODY MASS INDEX: 31.65 KG/M2 | SYSTOLIC BLOOD PRESSURE: 96 MMHG | DIASTOLIC BLOOD PRESSURE: 54 MMHG | TEMPERATURE: 98.1 F

## 2025-07-13 DIAGNOSIS — N39.0 URINARY TRACT INFECTION WITHOUT HEMATURIA, SITE UNSPECIFIED: ICD-10-CM

## 2025-07-13 DIAGNOSIS — M54.42 CHRONIC BILATERAL LOW BACK PAIN WITH BILATERAL SCIATICA: ICD-10-CM

## 2025-07-13 DIAGNOSIS — I95.89 CHRONIC HYPOTENSION: ICD-10-CM

## 2025-07-13 DIAGNOSIS — M54.41 CHRONIC BILATERAL LOW BACK PAIN WITH BILATERAL SCIATICA: ICD-10-CM

## 2025-07-13 DIAGNOSIS — G89.29 CHRONIC BILATERAL LOW BACK PAIN WITH BILATERAL SCIATICA: ICD-10-CM

## 2025-07-13 DIAGNOSIS — Z98.1 HISTORY OF FUSION OF THORACIC SPINE: ICD-10-CM

## 2025-07-13 DIAGNOSIS — N17.9 AKI (ACUTE KIDNEY INJURY): Primary | ICD-10-CM

## 2025-07-13 PROBLEM — J45.20 MILD INTERMITTENT ASTHMA: Status: ACTIVE | Noted: 2020-02-27

## 2025-07-13 PROBLEM — N30.00 ACUTE CYSTITIS WITHOUT HEMATURIA: Status: ACTIVE | Noted: 2025-07-13

## 2025-07-13 PROBLEM — D50.9 IRON DEFICIENCY ANEMIA: Status: ACTIVE | Noted: 2020-02-27

## 2025-07-13 PROBLEM — E78.5 HYPERLIPIDEMIA: Status: ACTIVE | Noted: 2020-02-27

## 2025-07-13 PROBLEM — N18.30 CKD (CHRONIC KIDNEY DISEASE) STAGE 3, GFR 30-59 ML/MIN (MULTI): Status: ACTIVE | Noted: 2022-09-01

## 2025-07-13 PROBLEM — M19.91 LOCALIZED, PRIMARY OSTEOARTHRITIS: Status: ACTIVE | Noted: 2020-09-28

## 2025-07-13 PROBLEM — G47.33 OBSTRUCTIVE SLEEP APNEA SYNDROME: Status: ACTIVE | Noted: 2024-07-22

## 2025-07-13 PROBLEM — M54.12 CERVICAL RADICULOPATHY: Status: ACTIVE | Noted: 2020-02-27

## 2025-07-13 PROBLEM — F32.1 MODERATE MAJOR DEPRESSION (MULTI): Status: ACTIVE | Noted: 2020-02-27

## 2025-07-13 PROBLEM — I50.32 CHRONIC DIASTOLIC HEART FAILURE: Status: ACTIVE | Noted: 2024-07-17

## 2025-07-13 PROBLEM — M47.816 LUMBAR SPONDYLOSIS: Status: ACTIVE | Noted: 2023-04-28

## 2025-07-13 PROBLEM — I10 ESSENTIAL (PRIMARY) HYPERTENSION: Status: ACTIVE | Noted: 2020-02-27

## 2025-07-13 LAB
ANION GAP SERPL CALC-SCNC: 10 MMOL/L (ref 10–20)
APPEARANCE UR: CLEAR
BACTERIA #/AREA URNS AUTO: ABNORMAL /HPF
BASOPHILS # BLD AUTO: 0.04 X10*3/UL (ref 0–0.1)
BASOPHILS NFR BLD AUTO: 0.4 %
BILIRUB UR STRIP.AUTO-MCNC: NEGATIVE MG/DL
BUN SERPL-MCNC: 12 MG/DL (ref 6–23)
CALCIUM SERPL-MCNC: 8.5 MG/DL (ref 8.6–10.6)
CHLORIDE SERPL-SCNC: 101 MMOL/L (ref 98–107)
CO2 SERPL-SCNC: 28 MMOL/L (ref 21–32)
COLOR UR: YELLOW
CREAT SERPL-MCNC: 1.14 MG/DL (ref 0.5–1.05)
CRP SERPL-MCNC: 8.92 MG/DL
EGFRCR SERPLBLD CKD-EPI 2021: 51 ML/MIN/1.73M*2
EOSINOPHIL # BLD AUTO: 0.25 X10*3/UL (ref 0–0.4)
EOSINOPHIL NFR BLD AUTO: 2.7 %
ERYTHROCYTE [DISTWIDTH] IN BLOOD BY AUTOMATED COUNT: 13.4 % (ref 11.5–14.5)
ERYTHROCYTE [SEDIMENTATION RATE] IN BLOOD BY WESTERGREN METHOD: 38 MM/H (ref 0–30)
GLUCOSE SERPL-MCNC: 98 MG/DL (ref 74–99)
GLUCOSE UR STRIP.AUTO-MCNC: NORMAL MG/DL
HCT VFR BLD AUTO: 24.6 % (ref 36–46)
HGB BLD-MCNC: 8 G/DL (ref 12–16)
IMM GRANULOCYTES # BLD AUTO: 0.05 X10*3/UL (ref 0–0.5)
IMM GRANULOCYTES NFR BLD AUTO: 0.5 % (ref 0–0.9)
KETONES UR STRIP.AUTO-MCNC: NEGATIVE MG/DL
LEUKOCYTE ESTERASE UR QL STRIP.AUTO: ABNORMAL
LYMPHOCYTES # BLD AUTO: 2.16 X10*3/UL (ref 0.8–3)
LYMPHOCYTES NFR BLD AUTO: 23.5 %
MCH RBC QN AUTO: 28.3 PG (ref 26–34)
MCHC RBC AUTO-ENTMCNC: 32.5 G/DL (ref 32–36)
MCV RBC AUTO: 87 FL (ref 80–100)
MONOCYTES # BLD AUTO: 0.78 X10*3/UL (ref 0.05–0.8)
MONOCYTES NFR BLD AUTO: 8.5 %
NEUTROPHILS # BLD AUTO: 5.91 X10*3/UL (ref 1.6–5.5)
NEUTROPHILS NFR BLD AUTO: 64.4 %
NITRITE UR QL STRIP.AUTO: NEGATIVE
NRBC BLD-RTO: 0 /100 WBCS (ref 0–0)
OSMOLALITY SERPL: 283 MOSM/KG (ref 280–300)
PH UR STRIP.AUTO: 5.5 [PH]
PLATELET # BLD AUTO: 325 X10*3/UL (ref 150–450)
POTASSIUM SERPL-SCNC: 3.7 MMOL/L (ref 3.5–5.3)
PROT UR STRIP.AUTO-MCNC: NEGATIVE MG/DL
RBC # BLD AUTO: 2.83 X10*6/UL (ref 4–5.2)
RBC # UR STRIP.AUTO: NEGATIVE MG/DL
RBC #/AREA URNS AUTO: ABNORMAL /HPF
SODIUM SERPL-SCNC: 135 MMOL/L (ref 136–145)
SP GR UR STRIP.AUTO: 1.02
SQUAMOUS #/AREA URNS AUTO: ABNORMAL /HPF
UROBILINOGEN UR STRIP.AUTO-MCNC: NORMAL MG/DL
WBC # BLD AUTO: 9.2 X10*3/UL (ref 4.4–11.3)
WBC #/AREA URNS AUTO: ABNORMAL /HPF

## 2025-07-13 PROCEDURE — 96375 TX/PRO/DX INJ NEW DRUG ADDON: CPT

## 2025-07-13 PROCEDURE — 71045 X-RAY EXAM CHEST 1 VIEW: CPT | Performed by: RADIOLOGY

## 2025-07-13 PROCEDURE — 80048 BASIC METABOLIC PNL TOTAL CA: CPT

## 2025-07-13 PROCEDURE — G0378 HOSPITAL OBSERVATION PER HR: HCPCS

## 2025-07-13 PROCEDURE — 99285 EMERGENCY DEPT VISIT HI MDM: CPT | Mod: 25 | Performed by: EMERGENCY MEDICINE

## 2025-07-13 PROCEDURE — 2500000004 HC RX 250 GENERAL PHARMACY W/ HCPCS (ALT 636 FOR OP/ED)

## 2025-07-13 PROCEDURE — 99223 1ST HOSP IP/OBS HIGH 75: CPT | Performed by: NURSE PRACTITIONER

## 2025-07-13 PROCEDURE — 96365 THER/PROPH/DIAG IV INF INIT: CPT

## 2025-07-13 PROCEDURE — 96361 HYDRATE IV INFUSION ADD-ON: CPT

## 2025-07-13 PROCEDURE — 71045 X-RAY EXAM CHEST 1 VIEW: CPT

## 2025-07-13 PROCEDURE — 83930 ASSAY OF BLOOD OSMOLALITY: CPT | Performed by: NURSE PRACTITIONER

## 2025-07-13 PROCEDURE — 2500000004 HC RX 250 GENERAL PHARMACY W/ HCPCS (ALT 636 FOR OP/ED): Performed by: NURSE PRACTITIONER

## 2025-07-13 PROCEDURE — 2500000001 HC RX 250 WO HCPCS SELF ADMINISTERED DRUGS (ALT 637 FOR MEDICARE OP)

## 2025-07-13 PROCEDURE — 85652 RBC SED RATE AUTOMATED: CPT

## 2025-07-13 PROCEDURE — 2500000001 HC RX 250 WO HCPCS SELF ADMINISTERED DRUGS (ALT 637 FOR MEDICARE OP): Performed by: NURSE PRACTITIONER

## 2025-07-13 PROCEDURE — 81001 URINALYSIS AUTO W/SCOPE: CPT

## 2025-07-13 PROCEDURE — 87086 URINE CULTURE/COLONY COUNT: CPT

## 2025-07-13 PROCEDURE — 85025 COMPLETE CBC W/AUTO DIFF WBC: CPT

## 2025-07-13 PROCEDURE — 86140 C-REACTIVE PROTEIN: CPT

## 2025-07-13 RX ORDER — POLYETHYLENE GLYCOL 3350 17 G/17G
17 POWDER, FOR SOLUTION ORAL DAILY PRN
Status: DISCONTINUED | OUTPATIENT
Start: 2025-07-13 | End: 2025-07-15 | Stop reason: HOSPADM

## 2025-07-13 RX ORDER — ACETAMINOPHEN 160 MG/5ML
650 SOLUTION ORAL EVERY 4 HOURS PRN
Status: DISCONTINUED | OUTPATIENT
Start: 2025-07-13 | End: 2025-07-15 | Stop reason: HOSPADM

## 2025-07-13 RX ORDER — AMOXICILLIN 250 MG
1 CAPSULE ORAL 2 TIMES DAILY
Status: DISCONTINUED | OUTPATIENT
Start: 2025-07-14 | End: 2025-07-15 | Stop reason: HOSPADM

## 2025-07-13 RX ORDER — ACETAMINOPHEN 325 MG/1
975 TABLET ORAL ONCE
Status: COMPLETED | OUTPATIENT
Start: 2025-07-13 | End: 2025-07-13

## 2025-07-13 RX ORDER — CITALOPRAM 40 MG/1
40 TABLET ORAL DAILY
Status: DISCONTINUED | OUTPATIENT
Start: 2025-07-14 | End: 2025-07-15 | Stop reason: HOSPADM

## 2025-07-13 RX ORDER — CYCLOBENZAPRINE HCL 10 MG
10 TABLET ORAL 3 TIMES DAILY PRN
Status: DISCONTINUED | OUTPATIENT
Start: 2025-07-13 | End: 2025-07-15 | Stop reason: HOSPADM

## 2025-07-13 RX ORDER — TRAZODONE HYDROCHLORIDE 50 MG/1
50 TABLET ORAL NIGHTLY PRN
Status: DISCONTINUED | OUTPATIENT
Start: 2025-07-13 | End: 2025-07-15 | Stop reason: HOSPADM

## 2025-07-13 RX ORDER — CEFTRIAXONE 1 G/50ML
1 INJECTION, SOLUTION INTRAVENOUS ONCE
Status: COMPLETED | OUTPATIENT
Start: 2025-07-13 | End: 2025-07-13

## 2025-07-13 RX ORDER — FLECAINIDE ACETATE 50 MG/1
50 TABLET ORAL 2 TIMES DAILY
Status: DISCONTINUED | OUTPATIENT
Start: 2025-07-13 | End: 2025-07-15 | Stop reason: HOSPADM

## 2025-07-13 RX ORDER — PREGABALIN 25 MG/1
50 CAPSULE ORAL 2 TIMES DAILY
Status: DISCONTINUED | OUTPATIENT
Start: 2025-07-14 | End: 2025-07-15 | Stop reason: HOSPADM

## 2025-07-13 RX ORDER — OXYCODONE HYDROCHLORIDE 5 MG/1
5 TABLET ORAL EVERY 6 HOURS PRN
Refills: 0 | Status: DISCONTINUED | OUTPATIENT
Start: 2025-07-13 | End: 2025-07-14

## 2025-07-13 RX ORDER — FENTANYL CITRATE 50 UG/ML
50 INJECTION, SOLUTION INTRAMUSCULAR; INTRAVENOUS ONCE
Status: COMPLETED | OUTPATIENT
Start: 2025-07-13 | End: 2025-07-13

## 2025-07-13 RX ORDER — PREGABALIN 50 MG/1
100 CAPSULE ORAL DAILY
Status: DISCONTINUED | OUTPATIENT
Start: 2025-07-14 | End: 2025-07-13

## 2025-07-13 RX ORDER — ACETAMINOPHEN 325 MG/1
650 TABLET ORAL EVERY 4 HOURS PRN
Status: DISCONTINUED | OUTPATIENT
Start: 2025-07-13 | End: 2025-07-15 | Stop reason: HOSPADM

## 2025-07-13 RX ORDER — HEPARIN SODIUM 5000 [USP'U]/ML
5000 INJECTION, SOLUTION INTRAVENOUS; SUBCUTANEOUS EVERY 8 HOURS
Status: DISCONTINUED | OUTPATIENT
Start: 2025-07-14 | End: 2025-07-15 | Stop reason: HOSPADM

## 2025-07-13 RX ORDER — SULFAMETHOXAZOLE AND TRIMETHOPRIM 800; 160 MG/1; MG/1
1 TABLET ORAL EVERY 12 HOURS SCHEDULED
Status: DISCONTINUED | OUTPATIENT
Start: 2025-07-14 | End: 2025-07-15 | Stop reason: HOSPADM

## 2025-07-13 RX ORDER — PANTOPRAZOLE SODIUM 40 MG/1
40 TABLET, DELAYED RELEASE ORAL
Status: DISCONTINUED | OUTPATIENT
Start: 2025-07-14 | End: 2025-07-15 | Stop reason: HOSPADM

## 2025-07-13 RX ORDER — MULTIVIT-MIN/IRON FUM/FOLIC AC 7.5 MG-4
1 TABLET ORAL DAILY
Status: DISCONTINUED | OUTPATIENT
Start: 2025-07-14 | End: 2025-07-15 | Stop reason: HOSPADM

## 2025-07-13 RX ORDER — OXYCODONE HYDROCHLORIDE 5 MG/1
5 TABLET ORAL EVERY 4 HOURS PRN
Status: DISCONTINUED | OUTPATIENT
Start: 2025-07-13 | End: 2025-07-13

## 2025-07-13 RX ORDER — METHOCARBAMOL 100 MG/ML
500 INJECTION, SOLUTION INTRAMUSCULAR; INTRAVENOUS ONCE
Status: COMPLETED | OUTPATIENT
Start: 2025-07-13 | End: 2025-07-13

## 2025-07-13 RX ADMIN — CEFTRIAXONE 1 G: 1 INJECTION, SOLUTION INTRAVENOUS at 21:43

## 2025-07-13 RX ADMIN — FLECAINIDE ACETATE 50 MG: 50 TABLET ORAL at 22:18

## 2025-07-13 RX ADMIN — HYDROMORPHONE HYDROCHLORIDE 0.5 MG: 1 INJECTION, SOLUTION INTRAMUSCULAR; INTRAVENOUS; SUBCUTANEOUS at 19:41

## 2025-07-13 RX ADMIN — SODIUM CHLORIDE 1000 ML: 0.9 INJECTION, SOLUTION INTRAVENOUS at 19:57

## 2025-07-13 RX ADMIN — FENTANYL CITRATE 50 MCG: 50 INJECTION INTRAMUSCULAR; INTRAVENOUS at 17:52

## 2025-07-13 RX ADMIN — METHOCARBAMOL 500 MG: 1000 INJECTION, SOLUTION INTRAMUSCULAR; INTRAVENOUS at 19:41

## 2025-07-13 RX ADMIN — ACETAMINOPHEN 975 MG: 325 TABLET ORAL at 19:41

## 2025-07-13 ASSESSMENT — PAIN DESCRIPTION - LOCATION: LOCATION: BACK

## 2025-07-13 ASSESSMENT — ENCOUNTER SYMPTOMS
DIARRHEA: 0
WEAKNESS: 1
NAUSEA: 0
FEVER: 1
WOUND: 1
VOMITING: 0
HEADACHES: 0
FREQUENCY: 0
CHILLS: 0
DIZZINESS: 0
BACK PAIN: 1
LIGHT-HEADEDNESS: 0
SHORTNESS OF BREATH: 0
DIFFICULTY URINATING: 0
APPETITE CHANGE: 0
DYSURIA: 0
COUGH: 0
CONFUSION: 0
ABDOMINAL PAIN: 0

## 2025-07-13 ASSESSMENT — PAIN SCALES - GENERAL
PAINLEVEL_OUTOF10: 7
PAINLEVEL_OUTOF10: 8

## 2025-07-13 ASSESSMENT — PAIN - FUNCTIONAL ASSESSMENT: PAIN_FUNCTIONAL_ASSESSMENT: 0-10

## 2025-07-13 ASSESSMENT — PAIN DESCRIPTION - ORIENTATION: ORIENTATION: MID

## 2025-07-13 NOTE — ED TRIAGE NOTES
Patient transferred from [facility] ED for concerns of infection to spinal fusion surgical site from 7/1/2025. Per patient she has been having fevers and redness at the incision site that started about 2 x days ago. Vancomycin and Zosyn started at OSH, 50 of fentanyl given prior to transport to .

## 2025-07-13 NOTE — PROGRESS NOTES
Emergency Department Transition of Care Note       Signout   I received Lanie Pablo in signout from Dr. Butcher.  Please see the ED Provider Note for all HPI, PE and MDM up to the time of signout at 7p.  This is in addition to the primary record.    In brief Lanie Pablo is an 70 y.o. female presenting for concern for fever, significant back pain.  Patient had a recorded fever at [facility] earlier today received Vanco Zosyn Tylenol and Toradol, 2 L IV fluid prior to arrival in the ED.  Patient was given fentanyl for pain control on arrival to the emergency department, neurosurgery examined patient to evaluate for SSI.  Chest x-ray UA and labs were obtained.    At the time of signout we were awaiting:  Neurosurgery recommendations and evaluation, lab work and imaging    ED Course & Medical Decision Making   Medical Decision Making:  Under my care, chest x-ray resulted with no acute cardiopulmonary abnormalities.  Lab work remarkable for elevated MERCEDES, patient given additional fluid bolus.  Most recent EF normal per chart review.  UA with 500+ leukocytes, negative for nitrates bacteria +1,   Neurosurgery states they are not concerned for surgical site infection at this time, recommend prophylactic coverage with Bactrim.  Patient does have allergy to sulfa with significant hives. patient given ceftriaxone, being over 6 hours post Vanc/Zosyn at this time. Patient admitted to medicine for MERCEDES, antibiotics, uncontrolled pain.    ED Course:  ED Course as of 07/13/25 2000   Sun Jul 13, 2025   3395 ATTENDING ATTESTATION  70-year-old female postop from recent spinal fusion July 1 presenting to the emergency department with concern for wound dehiscence possible infection sent from sending hospital following IV antibiotic administration vancomycin and Zosyn.  Patient notes fevers at home.  On arrival to the emergency department here vital signs are stable no sign of sepsis or toxicity no meningismus or encephalopathy  moving all extremities spontaneously with symmetric neuroexam.  We will have neurosurgery see and evaluate the patient her disposition is pending recommendations  [facility] []   1949 Creatinine(!): 1.14  Concern for MERCEDES, normal saline ordered. []   1956 Attending update  Patient remains hemodynamically stable no fever tachycardia or evidence of sepsis.  Has a mild MERCEDES likely due to volume depletion we will hydrate and reassess.  Patient has been seen and evaluated by neurosurgery, I agree the wound looks like it is healing well appropriate mild jeffery-incisional erythema but no evidence of induration the patient is neuro intact.  Patient is having persistent pain requiring IV opiates, because of that we will admit the patient for continued pain management  [facility] []      ED Course User Index  [JH] Trudy Mitchell DO  [] Ted Osman DO       Disposition   As a result of their workup, the patient will require admission to the hospital.  The patient was informed of her diagnosis.  The patient was given the opportunity to ask questions and I answered them. The patient agreed to be admitted to the hospital.    Procedures   Procedures    Patient seen and discussed with ED attending physician.    Trudy Mitchell DO  Emergency Medicine

## 2025-07-13 NOTE — CONSULTS
Inpatient consult to Neurosurgery  Consult performed by: Laurent Aaron MD  Consult ordered by: Ted Osman DO        Reason For Consult  C/f wound dehisc    History Of Present Illness  Lanie Pablo is a 70 y.o. female with h/o CHF, HLD, Afib, CKD3, asthma, orthostatic hypotension, vasovagal syncope, Iron deficiency anemia, GERD, s/p gastric sleeve 2005, bowel incontinence p/w back pain, b/l sciatica, gait instability, 7/1 s/p exploration of prior T10-pelvis, sacral and pelvic instrumentation, 7/8 discharged to rehab, 7/11 p/w mild fever, incision erythema.    The patient reported that a few days prior to the visit, she had started experiencing a low-grade fever and redness at the site of a surgical incision. The redness increased, and a spot on the incision appeared w may be some drainage. The patient described the pain as worsening, particularly when attempting to walk, necessitating the use of a walker for mobility. The incision possibly had some drainage, although the patient was uncertain about the nature of the drainage. The patient mentioned that the fever reached 100.4°F in the morning. The patient indicated that overall, they felt okay. The patient did not report burning during urination.     Past Medical History  She has a past medical history of Abnormal EKG, Anemia, Arrhythmia, Arthritis, Asthma, Chronic bilateral low back pain with bilateral sciatica, CKD (chronic kidney disease), GERD (gastroesophageal reflux disease), HL (hearing loss), Personal history of other diseases of the musculoskeletal system and connective tissue (08/13/2021), Personal history of other diseases of the respiratory system (08/13/2021), and Personal history of other endocrine, nutritional and metabolic disease.    Surgical History  She has a past surgical history that includes Cholecystectomy (08/24/2017); Other surgical history (08/24/2017); Lumbar laminectomy (08/24/2017); Cervical fusion (08/24/2017); Appendectomy  "(08/24/2017); Colon surgery (08/13/2021); Colonoscopy; and Stomach surgery (2005).     Social History  She reports that she has never smoked. She has never been exposed to tobacco smoke. She has never used smokeless tobacco. She reports that she does not currently use alcohol. She reports current drug use. Drug: Marijuana.    Family History  Family History[1]     Allergies  Nickel, Colchicine, Fluticasone propion-salmeterol, Erythromycin, Hydroxychloroquine, Morphine, and Sulfa (sulfonamide antibiotics)    Review of Systems   Review of systems was reviewed and otherwise negative other than what was listed in the HPI.    Physical Exam   No acute distress  On room air, breathing comfortably   A&Ox3  OU3R, EOMI   Face symmetric, Facial SILT   Tongue midline and symmetric  Shoulder shrugs symmetric  Hearing intact to finger rubs bilaterally  RUE D5 / B5 / T5 / HG 5/ IO 5  LUE D5 / B5 / T5 / HG 5/ IO 5  RLE HF5 / KE 5/ DF 5/ PF 5  LLE HF5 / KE 5/ DF 5/ PF 5  Negative hernandez  No clonus  SILT  Incision w mild erythema w no drainage         Last Recorded Vitals  Blood pressure 100/82, pulse 56, temperature 37 °C (98.6 °F), temperature source Oral, resp. rate 16, height 1.651 m (5' 5\"), weight 86.2 kg (190 lb), SpO2 99%.    Relevant Results  No results found for this or any previous visit (from the past 24 hours).       Assessment/Plan     Lanie Pablo is a 70 y.o. female with h/o CHF, HLD, Afib, CKD3, asthma, orthostatic hypotension, vasovagal syncope, Iron deficiency anemia, GERD, s/p gastric sleeve 2005, bowel incontinence p/w back pain, b/l sciatica, gait instability, 7/1 s/p exploration of prior T10-pelvis, sacral and pelvic instrumentation, 7/8 discharged to rehab, 7/11 p/w mild fever, incision erythema      Patient is presenting with mild fever (100.4) and history of erythema.  Patient's spine exam is stable (no focal neurodeficits).  Incision is clean with mild erythema, but no obvious drainage.  "     Recs  Please obtain ESR, CRP, UA with reflex culture, chest x-ray, CBC with differential  Patient's incision is reassuring.  But will need Aquacel Ag to be sent with patient on dispo  Further recs pending above    Updated recs  - Pt staffed w Dr. Quigley  - Incision dressed w Aquacel AG and telfa island dressing for incision protection  - Pt's CBC is with no leukocytosis, ESR and CRP mildy elevated, UA w 500 leuks o/w neg for nitrite, CXR is unremarkable  - Incision is clean and dry with no drainage   - Recommend dispo to rehab on bactrim (or substitute abx if sulfa allergy is concern) for 10-14 days (given pt's extensive surgery and wound protection, also UTI likely source of pt's fever  - Pt has neurosurgery follow up on 7/21   - Please ensure pt is discharged with instruction to keep incision clean and dry dressing w daily aquacel AG and telfa dressing   - ok for dispo from neurosurgery perspective    Laurent Aaron MD           [1]   Family History  Problem Relation Name Age of Onset    No Known Problems Mother      No Known Problems Father      Atrial fibrillation Brother

## 2025-07-13 NOTE — ED PROVIDER NOTES
Emergency Department Provider Note       History of Present Illness     History provided by: Patient and EMS  Limitations to History: None  External Records Reviewed with Brief Summary: EMR    HPI:  Lanie Pablo is a 70 y.o. female with past medical history of atrial fibrillation, asthma, GERD, CKD, CHF, ankylosing spondylitis, who [facility] and concern of surgical incision infection. Patient is 12 days s/p prior T10-Pelvis Fusion with revision of amanda fracture at L3-L4 and S1 Fusion bilaterally as well as revision posterolateral arthrodesis L5-S1 on 7/1/25. Blood cultures were drawn, vancomycin (@1440) and Zosyn (@1405) given at [place].  Received 2 L IVF.  Patient received fentanyl, reports pain returned.  Also received Tylenol and Toradol as antipyretics, afebrile here though noted to have true fever at [place].  No numbness tingling weakness anywhere in her body, no other acute complaints at this time.    Physical Exam   Triage vitals:  T 37 °C (98.6 °F)  HR 56  /82  RR 16  O2 99 % None (Room air)    General: Awake, alert, in no acute distress  Eyes: Gaze conjugate.  No scleral icterus or injection  HENT: Normo-cephalic, atraumatic. No stridor  CV: Regular rate, regular rhythm. Radial pulses 2+ bilaterally  Resp: Breathing non-labored, speaking in full sentences.  Clear to auscultation bilaterally  GI: Soft, non-distended, non-tender. No rebound or guarding.  Back: Midline surgical incision from T-spine to sacrum with staples in place. Surrounding erythema at some areas along incision with associated ttp. Minimal dehiscence noted mid incision with associated purulence able to be expressed with palpation  MSK/Extremities: No gross bony deformities. Moving all extremities  Skin: Warm. Appropriate color  Neuro: Alert. Oriented. Face symmetric. Speech is fluent.  Gross strength and sensation intact in b/l UE and LEs  Psych: Appropriate mood and affect      Medical  Decision Making & ED Course   Medical Decision Makin y.o. female here with concerns of surgical incision infection s/p spinal fusion with NSGY 25. Experienced fevers, recorded fever at [place] rehab earlier today. Received vanco/zosyn at approx 1400 today. Afebrile here but received tylenol/toradol PTA. Pain transiently controlled with fentanyl, will redose here. Reached out to NSGY who examined patient and believes the fever due to another infectious etiology - recommends UA and CXR. Pending these studies, patient signed out to Dr. Mitchell.   ----    Differential diagnoses considered include but are not limited to: surgical incision infection, UTI, PNA    Social Determinants of Health which Significantly Impact Care: Social Determinants of Health which Significantly Impact Care: None identified     EKG Independent Interpretation: Sinus bradycardia 53 bpm, normal axis and intervals, no acute ST or T wave changes noted.    Independent Result Review and Interpretation: Relevant laboratory and radiographic results were reviewed and independently interpreted by myself.  As necessary, they are commented on in the ED Course.    Chronic conditions affecting the patient's care: As documented above in [facility]    The patient was discussed with the following consultants/services: NSGY    Care Considerations: As documented above in [facility]    ED Course:  ED Course as of 25 1843   Sun 2025   184 ATTENDING ATTESTATION  70-year-old female postop from recent spinal fusion  presenting to the emergency department with concern for wound dehiscence possible infection sent from sending hospital following IV antibiotic administration vancomycin and Zosyn.  Patient notes fevers at home.  On arrival to the emergency department here vital signs are stable no sign of sepsis or toxicity no meningismus or encephalopathy moving all extremities spontaneously with symmetric neuroexam.  We will have neurosurgery see and evaluate  the patient her disposition is pending recommendations  [facility] [RH]      ED Course User Index  [RH] Ted Osman, DO       Disposition   Patient was signed out to Dr. Graf at 1900 pending completion of their work-up.  Please see the next provider's transition of care note for the remainder of the patient's care.     Procedures   Procedures    Patient seen and discussed with ED attending physician.    Ramone Butcher, DO  Emergency Medicine

## 2025-07-14 LAB
ALBUMIN SERPL BCP-MCNC: 3.1 G/DL (ref 3.4–5)
ANION GAP SERPL CALC-SCNC: 12 MMOL/L (ref 10–20)
BACTERIA UR CULT: NORMAL
BASOPHILS # BLD AUTO: 0.03 X10*3/UL (ref 0–0.1)
BASOPHILS NFR BLD AUTO: 0.3 %
BUN SERPL-MCNC: 12 MG/DL (ref 6–23)
CALCIUM SERPL-MCNC: 8.4 MG/DL (ref 8.6–10.6)
CHLORIDE SERPL-SCNC: 105 MMOL/L (ref 98–107)
CO2 SERPL-SCNC: 28 MMOL/L (ref 21–32)
CREAT SERPL-MCNC: 1.06 MG/DL (ref 0.5–1.05)
EGFRCR SERPLBLD CKD-EPI 2021: 56 ML/MIN/1.73M*2
EOSINOPHIL # BLD AUTO: 0.22 X10*3/UL (ref 0–0.4)
EOSINOPHIL NFR BLD AUTO: 2.5 %
ERYTHROCYTE [DISTWIDTH] IN BLOOD BY AUTOMATED COUNT: 13.4 % (ref 11.5–14.5)
GLUCOSE SERPL-MCNC: 107 MG/DL (ref 74–99)
HCT VFR BLD AUTO: 24.9 % (ref 36–46)
HGB BLD-MCNC: 8.2 G/DL (ref 12–16)
HOLD SPECIMEN: NORMAL
IMM GRANULOCYTES # BLD AUTO: 0.06 X10*3/UL (ref 0–0.5)
IMM GRANULOCYTES NFR BLD AUTO: 0.7 % (ref 0–0.9)
LACTATE SERPL-SCNC: 0.5 MMOL/L (ref 0.4–2)
LYMPHOCYTES # BLD AUTO: 1.36 X10*3/UL (ref 0.8–3)
LYMPHOCYTES NFR BLD AUTO: 15.5 %
MAGNESIUM SERPL-MCNC: 1.86 MG/DL (ref 1.6–2.4)
MCH RBC QN AUTO: 28.6 PG (ref 26–34)
MCHC RBC AUTO-ENTMCNC: 32.9 G/DL (ref 32–36)
MCV RBC AUTO: 87 FL (ref 80–100)
MONOCYTES # BLD AUTO: 0.69 X10*3/UL (ref 0.05–0.8)
MONOCYTES NFR BLD AUTO: 7.9 %
NEUTROPHILS # BLD AUTO: 6.41 X10*3/UL (ref 1.6–5.5)
NEUTROPHILS NFR BLD AUTO: 73.1 %
NRBC BLD-RTO: 0 /100 WBCS (ref 0–0)
PHOSPHATE SERPL-MCNC: 3.4 MG/DL (ref 2.5–4.9)
PLATELET # BLD AUTO: 320 X10*3/UL (ref 150–450)
POTASSIUM SERPL-SCNC: 4.3 MMOL/L (ref 3.5–5.3)
RBC # BLD AUTO: 2.87 X10*6/UL (ref 4–5.2)
SODIUM SERPL-SCNC: 141 MMOL/L (ref 136–145)
WBC # BLD AUTO: 8.8 X10*3/UL (ref 4.4–11.3)

## 2025-07-14 PROCEDURE — 96372 THER/PROPH/DIAG INJ SC/IM: CPT | Performed by: NURSE PRACTITIONER

## 2025-07-14 PROCEDURE — 2500000004 HC RX 250 GENERAL PHARMACY W/ HCPCS (ALT 636 FOR OP/ED): Mod: JW,TB | Performed by: STUDENT IN AN ORGANIZED HEALTH CARE EDUCATION/TRAINING PROGRAM

## 2025-07-14 PROCEDURE — 2500000004 HC RX 250 GENERAL PHARMACY W/ HCPCS (ALT 636 FOR OP/ED): Performed by: NURSE PRACTITIONER

## 2025-07-14 PROCEDURE — 96376 TX/PRO/DX INJ SAME DRUG ADON: CPT

## 2025-07-14 PROCEDURE — 2500000001 HC RX 250 WO HCPCS SELF ADMINISTERED DRUGS (ALT 637 FOR MEDICARE OP): Performed by: NURSE PRACTITIONER

## 2025-07-14 PROCEDURE — G0378 HOSPITAL OBSERVATION PER HR: HCPCS

## 2025-07-14 PROCEDURE — 83605 ASSAY OF LACTIC ACID: CPT | Performed by: NURSE PRACTITIONER

## 2025-07-14 PROCEDURE — 2500000002 HC RX 250 W HCPCS SELF ADMINISTERED DRUGS (ALT 637 FOR MEDICARE OP, ALT 636 FOR OP/ED): Performed by: NURSE PRACTITIONER

## 2025-07-14 PROCEDURE — 85025 COMPLETE CBC W/AUTO DIFF WBC: CPT | Performed by: NURSE PRACTITIONER

## 2025-07-14 PROCEDURE — 99233 SBSQ HOSP IP/OBS HIGH 50: CPT | Performed by: STUDENT IN AN ORGANIZED HEALTH CARE EDUCATION/TRAINING PROGRAM

## 2025-07-14 PROCEDURE — 2500000001 HC RX 250 WO HCPCS SELF ADMINISTERED DRUGS (ALT 637 FOR MEDICARE OP): Performed by: STUDENT IN AN ORGANIZED HEALTH CARE EDUCATION/TRAINING PROGRAM

## 2025-07-14 PROCEDURE — 83735 ASSAY OF MAGNESIUM: CPT | Performed by: NURSE PRACTITIONER

## 2025-07-14 PROCEDURE — 80069 RENAL FUNCTION PANEL: CPT | Performed by: NURSE PRACTITIONER

## 2025-07-14 RX ORDER — SULFAMETHOXAZOLE AND TRIMETHOPRIM 800; 160 MG/1; MG/1
1 TABLET ORAL EVERY 12 HOURS SCHEDULED
Start: 2025-07-14 | End: 2025-07-24

## 2025-07-14 RX ORDER — OXYCODONE HYDROCHLORIDE 5 MG/1
10 TABLET ORAL EVERY 6 HOURS PRN
Status: DISPENSED | OUTPATIENT
Start: 2025-07-14 | End: 2025-07-14

## 2025-07-14 RX ORDER — OXYCODONE HYDROCHLORIDE 10 MG/1
10 TABLET ORAL EVERY 6 HOURS PRN
Start: 2025-07-14 | End: 2025-07-15 | Stop reason: HOSPADM

## 2025-07-14 RX ADMIN — ACETAMINOPHEN 650 MG: 325 TABLET ORAL at 04:40

## 2025-07-14 RX ADMIN — HYDROMORPHONE HYDROCHLORIDE 0.4 MG: 1 INJECTION, SOLUTION INTRAMUSCULAR; INTRAVENOUS; SUBCUTANEOUS at 14:36

## 2025-07-14 RX ADMIN — TRAZODONE HYDROCHLORIDE 50 MG: 50 TABLET ORAL at 01:10

## 2025-07-14 RX ADMIN — Medication 1 TABLET: at 08:37

## 2025-07-14 RX ADMIN — FLECAINIDE ACETATE 50 MG: 50 TABLET ORAL at 08:37

## 2025-07-14 RX ADMIN — DOCUSATE SODIUM 50 MG AND SENNOSIDES 8.6 MG 1 TABLET: 8.6; 5 TABLET, FILM COATED ORAL at 20:45

## 2025-07-14 RX ADMIN — OXYCODONE HYDROCHLORIDE 10 MG: 5 TABLET ORAL at 12:14

## 2025-07-14 RX ADMIN — HEPARIN SODIUM 5000 UNITS: 5000 INJECTION, SOLUTION INTRAVENOUS; SUBCUTANEOUS at 21:15

## 2025-07-14 RX ADMIN — OXYCODONE HYDROCHLORIDE 10 MG: 5 TABLET ORAL at 18:17

## 2025-07-14 RX ADMIN — CYCLOBENZAPRINE 10 MG: 10 TABLET, FILM COATED ORAL at 14:05

## 2025-07-14 RX ADMIN — OXYCODONE HYDROCHLORIDE 5 MG: 5 TABLET ORAL at 07:15

## 2025-07-14 RX ADMIN — PREGABALIN 50 MG: 25 CAPSULE ORAL at 20:44

## 2025-07-14 RX ADMIN — SULFAMETHOXAZOLE AND TRIMETHOPRIM 1 TABLET: 800; 160 TABLET ORAL at 08:37

## 2025-07-14 RX ADMIN — HEPARIN SODIUM 5000 UNITS: 5000 INJECTION, SOLUTION INTRAVENOUS; SUBCUTANEOUS at 14:01

## 2025-07-14 RX ADMIN — PREGABALIN 50 MG: 25 CAPSULE ORAL at 08:37

## 2025-07-14 RX ADMIN — CITALOPRAM HYDROBROMIDE 40 MG: 40 TABLET ORAL at 08:37

## 2025-07-14 RX ADMIN — OXYCODONE HYDROCHLORIDE 5 MG: 5 TABLET ORAL at 01:10

## 2025-07-14 RX ADMIN — PANTOPRAZOLE SODIUM 40 MG: 40 TABLET, DELAYED RELEASE ORAL at 06:04

## 2025-07-14 RX ADMIN — HEPARIN SODIUM 5000 UNITS: 5000 INJECTION, SOLUTION INTRAVENOUS; SUBCUTANEOUS at 06:04

## 2025-07-14 RX ADMIN — SULFAMETHOXAZOLE AND TRIMETHOPRIM 1 TABLET: 800; 160 TABLET ORAL at 20:45

## 2025-07-14 RX ADMIN — ACETAMINOPHEN 650 MG: 325 TABLET ORAL at 14:04

## 2025-07-14 RX ADMIN — CYCLOBENZAPRINE 10 MG: 10 TABLET, FILM COATED ORAL at 01:10

## 2025-07-14 RX ADMIN — FLECAINIDE ACETATE 50 MG: 50 TABLET ORAL at 20:47

## 2025-07-14 SDOH — SOCIAL STABILITY: SOCIAL INSECURITY: ABUSE: ADULT

## 2025-07-14 SDOH — SOCIAL STABILITY: SOCIAL INSECURITY: WITHIN THE LAST YEAR, HAVE YOU BEEN AFRAID OF YOUR PARTNER OR EX-PARTNER?: NO

## 2025-07-14 SDOH — HEALTH STABILITY: MENTAL HEALTH: HOW OFTEN DO YOU HAVE SIX OR MORE DRINKS ON ONE OCCASION?: NEVER

## 2025-07-14 SDOH — SOCIAL STABILITY: SOCIAL INSECURITY: ARE YOU OR HAVE YOU BEEN THREATENED OR ABUSED PHYSICALLY, EMOTIONALLY, OR SEXUALLY BY ANYONE?: NO

## 2025-07-14 SDOH — HEALTH STABILITY: MENTAL HEALTH: HOW OFTEN DO YOU HAVE A DRINK CONTAINING ALCOHOL?: NEVER

## 2025-07-14 SDOH — ECONOMIC STABILITY: FOOD INSECURITY: WITHIN THE PAST 12 MONTHS, THE FOOD YOU BOUGHT JUST DIDN'T LAST AND YOU DIDN'T HAVE MONEY TO GET MORE.: NEVER TRUE

## 2025-07-14 SDOH — ECONOMIC STABILITY: INCOME INSECURITY: IN THE PAST 12 MONTHS HAS THE ELECTRIC, GAS, OIL, OR WATER COMPANY THREATENED TO SHUT OFF SERVICES IN YOUR HOME?: NO

## 2025-07-14 SDOH — SOCIAL STABILITY: SOCIAL INSECURITY: HAS ANYONE EVER THREATENED TO HURT YOUR FAMILY OR YOUR PETS?: NO

## 2025-07-14 SDOH — ECONOMIC STABILITY: FOOD INSECURITY: WITHIN THE PAST 12 MONTHS, YOU WORRIED THAT YOUR FOOD WOULD RUN OUT BEFORE YOU GOT THE MONEY TO BUY MORE.: NEVER TRUE

## 2025-07-14 SDOH — SOCIAL STABILITY: SOCIAL INSECURITY: DO YOU FEEL UNSAFE GOING BACK TO THE PLACE WHERE YOU ARE LIVING?: NO

## 2025-07-14 SDOH — SOCIAL STABILITY: SOCIAL INSECURITY: WERE YOU ABLE TO COMPLETE ALL THE BEHAVIORAL HEALTH SCREENINGS?: YES

## 2025-07-14 SDOH — SOCIAL STABILITY: SOCIAL INSECURITY: WITHIN THE LAST YEAR, HAVE YOU BEEN HUMILIATED OR EMOTIONALLY ABUSED IN OTHER WAYS BY YOUR PARTNER OR EX-PARTNER?: NO

## 2025-07-14 SDOH — HEALTH STABILITY: MENTAL HEALTH: HOW MANY DRINKS CONTAINING ALCOHOL DO YOU HAVE ON A TYPICAL DAY WHEN YOU ARE DRINKING?: PATIENT DOES NOT DRINK

## 2025-07-14 SDOH — ECONOMIC STABILITY: FOOD INSECURITY: HOW HARD IS IT FOR YOU TO PAY FOR THE VERY BASICS LIKE FOOD, HOUSING, MEDICAL CARE, AND HEATING?: NOT HARD AT ALL

## 2025-07-14 SDOH — SOCIAL STABILITY: SOCIAL INSECURITY: DO YOU FEEL ANYONE HAS EXPLOITED OR TAKEN ADVANTAGE OF YOU FINANCIALLY OR OF YOUR PERSONAL PROPERTY?: NO

## 2025-07-14 SDOH — SOCIAL STABILITY: SOCIAL INSECURITY: HAVE YOU HAD ANY THOUGHTS OF HARMING ANYONE ELSE?: NO

## 2025-07-14 SDOH — SOCIAL STABILITY: SOCIAL INSECURITY: DOES ANYONE TRY TO KEEP YOU FROM HAVING/CONTACTING OTHER FRIENDS OR DOING THINGS OUTSIDE YOUR HOME?: NO

## 2025-07-14 SDOH — SOCIAL STABILITY: SOCIAL INSECURITY: HAVE YOU HAD THOUGHTS OF HARMING ANYONE ELSE?: NO

## 2025-07-14 SDOH — SOCIAL STABILITY: SOCIAL INSECURITY: ARE THERE ANY APPARENT SIGNS OF INJURIES/BEHAVIORS THAT COULD BE RELATED TO ABUSE/NEGLECT?: NO

## 2025-07-14 ASSESSMENT — COGNITIVE AND FUNCTIONAL STATUS - GENERAL
CLIMB 3 TO 5 STEPS WITH RAILING: A LITTLE
TURNING FROM BACK TO SIDE WHILE IN FLAT BAD: A LITTLE
WALKING IN HOSPITAL ROOM: A LITTLE
TURNING FROM BACK TO SIDE WHILE IN FLAT BAD: A LITTLE
MOVING TO AND FROM BED TO CHAIR: A LITTLE
MOBILITY SCORE: 18
TOILETING: A LITTLE
PATIENT BASELINE BEDBOUND: NO
MOVING FROM LYING ON BACK TO SITTING ON SIDE OF FLAT BED WITH BEDRAILS: A LITTLE
TOILETING: A LITTLE
MOVING FROM LYING ON BACK TO SITTING ON SIDE OF FLAT BED WITH BEDRAILS: A LITTLE
DAILY ACTIVITIY SCORE: 23
DAILY ACTIVITIY SCORE: 23
STANDING UP FROM CHAIR USING ARMS: A LITTLE
WALKING IN HOSPITAL ROOM: A LITTLE
MOBILITY SCORE: 18
STANDING UP FROM CHAIR USING ARMS: A LITTLE
MOVING TO AND FROM BED TO CHAIR: A LITTLE
CLIMB 3 TO 5 STEPS WITH RAILING: A LITTLE

## 2025-07-14 ASSESSMENT — PAIN - FUNCTIONAL ASSESSMENT
PAIN_FUNCTIONAL_ASSESSMENT: 0-10

## 2025-07-14 ASSESSMENT — ACTIVITIES OF DAILY LIVING (ADL)
FEEDING YOURSELF: INDEPENDENT
LACK_OF_TRANSPORTATION: NO
ASSISTIVE_DEVICE: WALKER
GROOMING: INDEPENDENT
ADEQUATE_TO_COMPLETE_ADL: YES
HEARING - LEFT EAR: FUNCTIONAL
TOILETING: INDEPENDENT
HEARING - RIGHT EAR: FUNCTIONAL
JUDGMENT_ADEQUATE_SAFELY_COMPLETE_DAILY_ACTIVITIES: YES
PATIENT'S MEMORY ADEQUATE TO SAFELY COMPLETE DAILY ACTIVITIES?: YES
BATHING: INDEPENDENT
WALKS IN HOME: INDEPENDENT
DRESSING YOURSELF: INDEPENDENT

## 2025-07-14 ASSESSMENT — PAIN SCALES - GENERAL
PAINLEVEL_OUTOF10: 3
PAINLEVEL_OUTOF10: 5 - MODERATE PAIN
PAINLEVEL_OUTOF10: 10 - WORST POSSIBLE PAIN
PAINLEVEL_OUTOF10: 7
PAINLEVEL_OUTOF10: 8
PAINLEVEL_OUTOF10: 6
PAINLEVEL_OUTOF10: 6
PAINLEVEL_OUTOF10: 4
PAINLEVEL_OUTOF10: 4
PAINLEVEL_OUTOF10: 8

## 2025-07-14 ASSESSMENT — PAIN DESCRIPTION - LOCATION: LOCATION: PELVIS

## 2025-07-14 ASSESSMENT — LIFESTYLE VARIABLES
HOW OFTEN DO YOU HAVE A DRINK CONTAINING ALCOHOL: NEVER
SKIP TO QUESTIONS 9-10: 1
SUBSTANCE_ABUSE_PAST_12_MONTHS: NO
HOW MANY STANDARD DRINKS CONTAINING ALCOHOL DO YOU HAVE ON A TYPICAL DAY: PATIENT DOES NOT DRINK
HOW OFTEN DO YOU HAVE 6 OR MORE DRINKS ON ONE OCCASION: NEVER
AUDIT-C TOTAL SCORE: 0
SKIP TO QUESTIONS 9-10: 1
AUDIT-C TOTAL SCORE: 0
AUDIT-C TOTAL SCORE: 0
PRESCIPTION_ABUSE_PAST_12_MONTHS: NO

## 2025-07-14 ASSESSMENT — PATIENT HEALTH QUESTIONNAIRE - PHQ9
SUM OF ALL RESPONSES TO PHQ9 QUESTIONS 1 & 2: 0
2. FEELING DOWN, DEPRESSED OR HOPELESS: NOT AT ALL
1. LITTLE INTEREST OR PLEASURE IN DOING THINGS: NOT AT ALL

## 2025-07-14 ASSESSMENT — PAIN DESCRIPTION - ORIENTATION: ORIENTATION: MID

## 2025-07-14 NOTE — PROGRESS NOTES
"ED SOCIAL WORK NOTE:     Lanie Pablo is a 70 y.o. female on day 0 of admission presenting with Acute cystitis without hematuria. Patient presents to [facility] [place] [place] ED from [facility]    [facility] Liaison: Jesus, [phone]     CRISTINA sent a return referral in [facility]. Patient requested SW to bedside, reporting that she would like to have the return process \"started immediately\". SW assured patient that it is in the works at this time, and pending the facility's review and response.    Addendum @ 4:48 PM:  Response received in [facility] - no bed available today for return.  MD at [facility] would additionally appreciate pain controlled on oral pain meds 24 hours before admitting.    Bed will be available tomorrow. Transport scheduled in [facility] for 2:30 PM 7/15. Nurse report is [phone].     07/14/25 1150   Discharge Planning   Expected Discharge Disposition Short Term A  ([facility])   Does the patient need discharge transport arranged? Yes   [facility] coordination needed? Yes   Has discharge transport been arranged? No   What day is the transport expected? 07/14/25   Patient Choice   Patient / Family choosing to utilize agency / facility established prior to hospitalization Yes   Intensity of Service   Intensity of Service >30 min     BALDO LACARAZ    "

## 2025-07-14 NOTE — PROGRESS NOTES
ED SOCIAL WORK NOTE:    Lanie Pablo is a 70 y.o. female on day 0 of admission presenting with Acute cystitis without hematuria.    NOK: Corey Pablo, Spouse:  [phone]            Sonya Pappas: [phone]  Home Care: [facility] previously referred; screening currently  PCP: Luke Reagan MD ()  Additional information: Medicare A+B, [employer] Supplement       07/14/25 1016   Discharge Planning   Who is requesting discharge planning? Provider   Does the patient need discharge transport arranged? Yes   [facility] coordination needed? Yes   Has discharge transport been arranged? No   Intensity of Service   Intensity of Service >30 min     BALDO ALCARAZ

## 2025-07-14 NOTE — PROGRESS NOTES
"Lanie Pablo is a 70 y.o. female on hospital day 0 of admission presenting with Acute cystitis without hematuria.    Subjective     Patient in moderate pain after walking to the bathroom. Clear to return to rehab, but bed not available will have to wait until tomorrow.    Objective     GENERAL APPEARANCE: A&Ox3, appears in no acute distress  HEAD: normocephalic, atraumatic  THROAT: Oral cavity and pharynx normal. No inflammation, swelling, exudate, or lesions.  NECK: Neck supple, non-tender without lymphadenopathy, masses or thyromegaly.  CARDIAC: Normal S1 and S2. No S3, S4 or murmurs. Rhythm is regular. There is no peripheral edema, cyanosis or pallor. Extremities are warm and well perfused. No carotid bruits.  LUNGS: Clear to auscultation bilaterally without rales, rhonchi, wheezing or diminished breath sounds.  ABDOMEN: Positive bowel sounds. Soft, nondistended, nontender. No guarding or rebound. No masses.  EXTREMITIES: No significant deformity or joint abnormality. No edema. Peripheral pulses intact. No varicosities.  SKIN: Skin normal color, texture and turgor with no lesions or rash  PSYCHIATRIC: oriented to person, place, and time, good judgement and reason, without hallucinations, abnormal affect or abnormal behaviors during the examination. Patient is not suicidal.        Last Recorded Vitals  Blood pressure 106/68, pulse 78, temperature 37.6 °C (99.6 °F), temperature source Oral, resp. rate 16, height 1.651 m (5' 5\"), weight 86.2 kg (190 lb), SpO2 98%.  Intake/Output last 3 Shifts:  No intake/output data recorded.    Relevant Results  Lab Results   Component Value Date    WBC 8.8 07/14/2025    HGB 8.2 (L) 07/14/2025    HCT 24.9 (L) 07/14/2025    MCV 87 07/14/2025     07/14/2025      Lab Results   Component Value Date    GLUCOSE 107 (H) 07/14/2025    CALCIUM 8.4 (L) 07/14/2025     07/14/2025    K 4.3 07/14/2025    CO2 28 07/14/2025     07/14/2025    BUN 12 07/14/2025    CREATININE " 1.06 (H) 07/14/2025       Assessment/Plan     Lanie Pablo is a 70 year old female with a PMH of depression, HLD, artial fibrillation, orthostatic hypotension, vasovagal syncope, CHF, Asthma, CKD stage 3, MATTHEW, B12 deficiency, GERD, gastric sleeve 2005, bowel incontinence, chronic bilateral low back pain with bilateral sciatica, DDD and OA. Pt presented to ED this evening from [facility] acute rehab due to concern for surgical site infection and fever.Pt had temp of 100.4 this morning at rehab. Temp on arrival to ED was 98.6. No leukocytosis on admit. Pt admitted to medicine under observation for UTI.     UTI  - UA: 500 LE; Urine culture: Pending results with sensitivities   - No prior positive urine culture results   - s/p Rocephin 1g while in the ED  - Antibiotic Regimen: Bactrim DS 1 tab PO BID (7/14-)  - Monitor kidney function; Provide maintenance fluids if needed; Encourage oral hydration.     Chronic bilateral lower back pain with sciatica s/p exploration of prior T10-pelvis, sacral and pelvic instrumentation on 7/1  - neurosurgery consulted, appreciate recs  - Neurosurgery recs: Incision dressed w Aquacel AG and telfa island dressing for incision protection. Pt's CBC is with no leukocytosis, ESR and CRP mildy elevated, UA w 500 leuks o/w neg for nitrite, CXR is unremarkable. Incision is clean and dry with no drainage. Recommend dispo to rehab on bactrim (or substitute abx if sulfa allergy is concern) for 10-14 days (given pt's extensive surgery and wound protection also UTI likely source of pt's fever. Pt has neurosurgery follow up on 7/21. Please ensure pt is discharged with instruction to keep incision clean and dry dressing w daily aquacel AG and telfa dressing. Ok for dispo from neurosurgery perspective.  - ok for pt to return to acute rehab on 7/14  - falls precautions  - c/w pain regimen prescribed last admit: tylenol 650 mg q6h PRN mild, Oxycodone 10 mg q6h PRN moderate to severe pain, and flexeril 10  mg PO TID PRN  - Clear for discharge per neurosurgery, SW contacted facility and stated bed not available will likely have to wait until tomorrow to return.     Anemia  - Hgb 8.0 on admit--> Hgb 9.1 on 7/10  - continue to monitor and transfuse as needed     CKD stage 3 (stable)  - Cr 1.14 GFR 51 on admit-->Cr 0.92 GFR 66 on 7/10  - encourage PO intake as tolerated  - RFP ordered for AM     Afib  - c/w home dose of Flecainide 50 mg PO BID        Code status: FULL CODE  Dispo: Cont. Abx, medically stable for discharge when facility has bed available.    Christian Mckee MD     The patient encounter includes all but not limited to; Evaluation of laboratory results, pertinent imaging, and vital signs. Daily updates are discussed with any consulting services and family/medical power of  as needed. The patient's discharge  process begins at admission and daily contact with the patient's TCC and SW is pertinent in their efficient and safe discharge.

## 2025-07-14 NOTE — H&P
History Of Present Illness  Lanie Pablo is a 70 y.o. female with a PMH of depression, HLD, artial fibrillation, orthostatic hypotension, vasovagal syncope, CHF, Asthma, CKD stage 3, MATTHEW, B12 deficiency, GERD, gastric sleeve 2005, bowel incontinence, chronic bilateral lower back pain with bilateral sciatica, DDD and OA. Pt presented to ED this evening from [facility] acute rehab due to concern for surgical site infection and fever. Pt had temp of 100.4 this morning at rehab. Temp on arrival to ED was 98.6. No leukocytosis on admit. Labs reviewed and notable for CKD stage 3, anemia, mild hyponatremia, and UTI. CXR negative on admit. Pt was recently hospitalized at [facility] [place] (7/1-7/8) due to chronic bilateral lower back pain with sciatica and she underwent 7/1 s/p exploration of prior T10-pelvis, sacral, and pelvic instrumentation. Neurosurgery consulted by ED provider d/t concern for wound dehiscence and pt was seen by consulting team while in ED. Per neurosurgery: Incision is clean and dry with no drainage.Recommend dispo to rehab on bactrim (or substitute abx if sulfa allergy is concern) for 10-14 days (given pt's extensive surgery and wound protection, also UTI likely source of pt's fever. Pt admitted to medicine under observation for UTI.     ED interventions: Fentanyl 50 mcg IV x1 dose, dilaudid 0.5 mg IV x1 dose, robaxin 500 mg IV push x1 dose, NS bolus x1 dose, Tylenol 975 mg x1 dose, and Rocephin 1g x1     Past Medical History  Medical History[1]    Surgical History  Surgical History[2]     Social History  She reports that she has never smoked. She has never been exposed to tobacco smoke. She has never used smokeless tobacco. She reports that she does not currently use alcohol. She reports current drug use. Drug: Marijuana.    Family History  Family History[3]     Allergies  Nickel, Colchicine, Fluticasone propion-salmeterol, Erythromycin, Hydroxychloroquine, Morphine, and Sulfa (sulfonamide  "antibiotics)    Review of Systems   Constitutional:  Positive for fever. Negative for appetite change and chills.   HENT: Negative.     Respiratory:  Negative for cough and shortness of breath.    Cardiovascular:  Negative for chest pain.   Gastrointestinal:  Negative for abdominal pain, diarrhea, nausea and vomiting.   Genitourinary:  Negative for difficulty urinating, dysuria and frequency.   Musculoskeletal:  Positive for back pain and gait problem.   Skin:  Positive for wound.   Neurological:  Positive for weakness. Negative for dizziness, light-headedness and headaches.   Psychiatric/Behavioral:  Negative for confusion.         Physical Exam  Vitals reviewed.   Constitutional:       General: She is not in acute distress.  HENT:      Head: Normocephalic.      Nose: Nose normal.      Mouth/Throat:      Mouth: Mucous membranes are dry.   Eyes:      Extraocular Movements: Extraocular movements intact.   Cardiovascular:      Rate and Rhythm: Normal rate.      Pulses: Normal pulses.      Heart sounds: Normal heart sounds.   Pulmonary:      Effort: Pulmonary effort is normal. No respiratory distress.      Breath sounds: Normal breath sounds.   Abdominal:      General: Bowel sounds are normal.      Palpations: Abdomen is soft.   Skin:     General: Skin is warm and dry.      Comments: Mild erythema at surgical site with no drainage present   Neurological:      Mental Status: She is alert and oriented to person, place, and time.          Last Recorded Vitals  Blood pressure 100/82, pulse 56, temperature 37 °C (98.6 °F), temperature source Oral, resp. rate 16, height 1.651 m (5' 5\"), weight 86.2 kg (190 lb), SpO2 99%.    Relevant Results    XR chest 1 view  Result Date: 7/13/2025  Interpreted By:  Azeem Mast and Stevens Alex STUDY: XR CHEST 1 VIEW;  7/13/2025 6:43 pm   INDICATION: Signs/Symptoms:r/o PNA.     COMPARISON: XR chest 06/17/2025   ACCESSION NUMBER(S): [id number]   ORDERING CLINICIAN: MAAME MAHAN" FINDINGS: AP radiograph of the chest was provided.       CARDIOMEDIASTINAL SILHOUETTE: Cardiomediastinal silhouette is normal in size and configuration.   LUNGS: No focal consolidation, pleural effusion, or pneumothorax.   ABDOMEN: No remarkable upper abdominal findings.   BONES: No acute osseous changes.       1.  No evidence of acute cardiopulmonary process.   I personally reviewed the images/study and I agree with the findings as stated by Cliff Alva DO PGY-3. This study was interpreted at [place] [facility], [place].   MACRO: None   Signed by: Azeem Mast 7/13/2025 6:57 PM Dictation workstation:   [id number]    Results for orders placed or performed during the hospital encounter of 07/13/25 (from the past 24 hours)   Sedimentation rate, automated   Result Value Ref Range    Sedimentation Rate 38 (H) 0 - 30 mm/h   C-reactive protein   Result Value Ref Range    C-Reactive Protein 8.92 (H) <1.00 mg/dL   CBC and Auto Differential   Result Value Ref Range    WBC 9.2 4.4 - 11.3 x10*3/uL    nRBC 0.0 0.0 - 0.0 /100 WBCs    RBC 2.83 (L) 4.00 - 5.20 x10*6/uL    Hemoglobin 8.0 (L) 12.0 - 16.0 g/dL    Hematocrit 24.6 (L) 36.0 - 46.0 %    MCV 87 80 - 100 fL    MCH 28.3 26.0 - 34.0 pg    MCHC 32.5 32.0 - 36.0 g/dL    RDW 13.4 11.5 - 14.5 %    Platelets 325 150 - 450 x10*3/uL    Neutrophils % 64.4 40.0 - 80.0 %    Immature Granulocytes %, Automated 0.5 0.0 - 0.9 %    Lymphocytes % 23.5 13.0 - 44.0 %    Monocytes % 8.5 2.0 - 10.0 %    Eosinophils % 2.7 0.0 - 6.0 %    Basophils % 0.4 0.0 - 2.0 %    Neutrophils Absolute 5.91 (H) 1.60 - 5.50 x10*3/uL    Immature Granulocytes Absolute, Automated 0.05 0.00 - 0.50 x10*3/uL    Lymphocytes Absolute 2.16 0.80 - 3.00 x10*3/uL    Monocytes Absolute 0.78 0.05 - 0.80 x10*3/uL    Eosinophils Absolute 0.25 0.00 - 0.40 x10*3/uL    Basophils Absolute 0.04 0.00 - 0.10 x10*3/uL   Basic metabolic panel   Result Value Ref Range    Glucose 98 74 - 99 mg/dL     Sodium 135 (L) 136 - 145 mmol/L    Potassium 3.7 3.5 - 5.3 mmol/L    Chloride 101 98 - 107 mmol/L    Bicarbonate 28 21 - 32 mmol/L    Anion Gap 10 10 - 20 mmol/L    Urea Nitrogen 12 6 - 23 mg/dL    Creatinine 1.14 (H) 0.50 - 1.05 mg/dL    eGFR 51 (L) >60 mL/min/1.73m*2    Calcium 8.5 (L) 8.6 - 10.6 mg/dL   Urinalysis with Reflex Culture and Microscopic   Result Value Ref Range    Color, Urine Yellow Light-Yellow, Yellow, Dark-Yellow    Appearance, Urine Clear Clear    Specific Gravity, Urine 1.020 1.005 - 1.035    pH, Urine 5.5 5.0, 5.5, 6.0, 6.5, 7.0, 7.5, 8.0    Protein, Urine NEGATIVE NEGATIVE, 10 (TRACE), 20 (TRACE) mg/dL    Glucose, Urine Normal Normal mg/dL    Blood, Urine NEGATIVE NEGATIVE mg/dL    Ketones, Urine NEGATIVE NEGATIVE mg/dL    Bilirubin, Urine NEGATIVE NEGATIVE mg/dL    Urobilinogen, Urine Normal Normal mg/dL    Nitrite, Urine NEGATIVE NEGATIVE    Leukocyte Esterase, Urine 500 Agustín/uL (A) NEGATIVE   Microscopic Only, Urine   Result Value Ref Range    WBC, Urine 6-10 (A) 1-5, NONE /HPF    RBC, Urine 3-5 NONE, 1-2, 3-5 /HPF    Squamous Epithelial Cells, Urine 1-9 (SPARSE) Reference range not established. /HPF    Bacteria, Urine 1+ (A) NONE SEEN /HPF      ED Medication Administration from 07/13/2025 1707 to 07/13/2025 2116         Date/Time Order Dose Route Action Action by     07/13/2025 1752 EDT fentaNYL PF (Sublimaze) injection 50 mcg 50 mcg intravenous Given Estrada, CALVIN     07/13/2025 1941 EDT acetaminophen (Tylenol) tablet 975 mg 975 mg oral Given Corapi, A     07/13/2025 1941 EDT HYDROmorphone (Dilaudid) injection 0.5 mg 0.5 mg intravenous Given Corapi, A     07/13/2025 1941 EDT methocarbamol (Robaxin) injection 500 mg 500 mg intravenous Given Corapi, A     07/13/2025 1957 EDT sodium chloride 0.9 % bolus 1,000 mL 1,000 mL intravenous New Bag Corapi, A     07/13/2025 2108 EDT sodium chloride 0.9 % bolus 1,000 mL 0 mL intravenous Stopped CRISTAL Hansen            Scheduled medications  Scheduled  Medications[4]  Continuous medications  Continuous Medications[5]  PRN medications  PRN Medications[6]    Assessment and Plan  Lanie Pablo is a 70 year old female with a PMH of depression, HLD, artial fibrillation, orthostatic hypotension, vasovagal syncope, CHF, Asthma, CKD stage 3, MATTHEW, B12 deficiency, GERD, gastric sleeve 2005, bowel incontinence, chronic bilateral low back pain with bilateral sciatica, DDD and OA. Pt presented to ED this evening from [facility] acute rehab due to concern for surgical site infection and fever.Pt had temp of 100.4 this morning at rehab. Temp on arrival to ED was 98.6. No leukocytosis on admit. Pt admitted to medicine under observation for UTI.    UTI  - UA: 500 LE; Urine culture: Pending results with sensitivities   - No prior positive urine culture results   - s/p Rocephin 1g while in the ED  - Antibiotic Regimen: Bactrim DS 1 tab PO BID (7/14-). Transition to PO antibiotic at discharge   - Monitor kidney function; Provide maintenance fluids if needed; Encourage oral hydration.    Chronic bilateral lower back pain with sciatica s/p exploration of prior T10-pelvis, sacral and pelvic instrumentation on 7/1  - neurosurgery consulted, appreciate recs  - Neurosurgery recs: Incision dressed w Aquacel AG and telfa island dressing for incision protection. Pt's CBC is with no leukocytosis, ESR and CRP mildy elevated, UA w 500 leuks o/w neg for nitrite, CXR is unremarkable. Incision is clean and dry with no drainage. Recommend dispo to rehab on bactrim (or substitute abx if sulfa allergy is concern) for 10-14 days (given pt's extensive surgery and wound protection also UTI likely source of pt's fever. Pt has neurosurgery follow up on 7/21. Please ensure pt is discharged with instruction to keep incision clean and dry dressing w daily aquacel AG and telfa dressing. Ok for dispo from neurosurgery perspective.  - ok for pt to return to acute rehab in AM  - falls precautions  - c/w pain  regimen prescribed last admit: tylenol 650 mg q6h PRN mild, Oxycodone 5 mg q6h PRN moderate to severe pain, and flexeril 10 mg PO TID PRN    Anemia  - Hgb 8.0 on admit--> Hgb 9.1 on 7/10  - continue to monitor and transfuse as needed    CKD stage 3 (stable)  - Cr 1.14 GFR 51 on admit-->Cr 0.92 GFR 66 on 7/10  - encourage PO intake as tolerated  - RFP ordered for AM    Afib  - c/w home dose of Flecainide 50 mg PO BID            Dispo: admit to RNF. Pt ok to return to acute rehab on 7/14 pt cleared by neurosurgery for discharge. Plan per above. Estimated LOS: 1 day    I spent 75 minutes in the professional and overall care of this patient.      Marysol White, APRN-CNP         [1]   Past Medical History:  Diagnosis Date    Abnormal EKG     2/16/25--Normal sinus rhythm Left axis deviation Abnormal ECG    Anemia     Arrhythmia     A-Fib on Eliquis    Arthritis     Asthma     Chronic bilateral low back pain with bilateral sciatica     seen by Dr. Mike Quigley 05/08/25    CKD (chronic kidney disease)     reports PCP was watching in the past, not in 2025    GERD (gastroesophageal reflux disease)     controlled    HL (hearing loss)     Personal history of other diseases of the musculoskeletal system and connective tissue 08/13/2021    History of degenerative disc disease    Personal history of other diseases of the respiratory system 08/13/2021    History of bronchitis    Personal history of other endocrine, nutritional and metabolic disease     History of hypercholesterolemia   [2]   Past Surgical History:  Procedure Laterality Date    APPENDECTOMY  08/24/2017    Appendectomy    CERVICAL FUSION  08/24/2017    Cervical Vertebral Fusion    CHOLECYSTECTOMY  08/24/2017    Cholecystectomy    COLON SURGERY  08/13/2021    Colon Surgery    COLONOSCOPY      LUMBAR LAMINECTOMY  08/24/2017    Laminectomy Lumbar    OTHER SURGICAL HISTORY  08/24/2017    Spinal Stereotaxis Stimulation Of Cord    STOMACH SURGERY  2005    gastric  sleeve   [3]   Family History  Problem Relation Name Age of Onset    No Known Problems Mother      No Known Problems Father      Atrial fibrillation Brother     [4] cefTRIAXone, 1 g, intravenous, Once  [START ON 7/14/2025] citalopram, 40 mg, oral, Daily  flecainide, 50 mg, oral, BID  [START ON 7/14/2025] heparin (porcine), 5,000 Units, subcutaneous, q8h  [START ON 7/14/2025] multivitamin, 1 tablet, oral, Daily  [START ON 7/14/2025] pantoprazole, 40 mg, oral, Daily before breakfast  [START ON 7/14/2025] pregabalin, 100 mg, oral, Daily  [START ON 7/14/2025] sennosides-docusate sodium, 1 tablet, oral, BID  [START ON 7/14/2025] sulfamethoxazole-trimethoprim, 1 tablet, oral, q12h KARIN  [5]    [6] PRN medications: acetaminophen **OR** acetaminophen, cyclobenzaprine, oxyCODONE, polyethylene glycol, traZODone

## 2025-07-14 NOTE — CARE PLAN
The patient's goals for the shift include pt will be treated for pain as needed    The clinical goals for the shift include Patient will have no c/o of pain or rate pain <4 this shift      Problem: Pain - Adult  Goal: Verbalizes/displays adequate comfort level or baseline comfort level  Outcome: Progressing     Problem: Safety - Adult  Goal: Free from fall injury  Outcome: Progressing     Problem: Discharge Planning  Goal: Discharge to home or other facility with appropriate resources  Outcome: Progressing     Problem: Chronic Conditions and Co-morbidities  Goal: Patient's chronic conditions and co-morbidity symptoms are monitored and maintained or improved  Outcome: Progressing     Problem: Nutrition  Goal: Nutrient intake appropriate for maintaining nutritional needs  Outcome: Progressing     Problem: Fall/Injury  Goal: Not fall by end of shift  Outcome: Progressing  Goal: Be free from injury by end of the shift  Outcome: Progressing  Goal: Verbalize understanding of personal risk factors for fall in the hospital  Outcome: Progressing  Goal: Verbalize understanding of risk factor reduction measures to prevent injury from fall in the home  Outcome: Progressing  Goal: Use assistive devices by end of the shift  Outcome: Progressing  Goal: Pace activities to prevent fatigue by end of the shift  Outcome: Progressing     Problem: Skin  Goal: Decreased wound size/increased tissue granulation at next dressing change  Outcome: Progressing  Flowsheets (Taken 7/14/2025 1832)  Decreased wound size/increased tissue granulation at next dressing change: Promote sleep for wound healing  Goal: Participates in plan/prevention/treatment measures  Outcome: Progressing  Flowsheets (Taken 7/14/2025 1832)  Participates in plan/prevention/treatment measures:   Discuss with provider PT/OT consult   Elevate heels   Increase activity/out of bed for meals  Goal: Prevent/manage excess moisture  Outcome: Progressing  Flowsheets (Taken  7/14/2025 1832)  Prevent/manage excess moisture:   Moisturize dry skin   Follow provider orders for dressing changes   Monitor for/manage infection if present  Goal: Prevent/minimize sheer/friction injuries  Outcome: Progressing  Flowsheets (Taken 7/14/2025 1832)  Prevent/minimize sheer/friction injuries: Increase activity/out of bed for meals  Goal: Promote/optimize nutrition  Outcome: Progressing  Flowsheets (Taken 7/14/2025 1832)  Promote/optimize nutrition:   Consume > 50% meals/supplements   Monitor/record intake including meals   Offer water/supplements/favorite foods  Goal: Promote skin healing  Outcome: Progressing  Flowsheets (Taken 7/14/2025 1832)  Promote skin healing: Assess skin/pad under line(s)/device(s)     Problem: Pain  Goal: Takes deep breaths with improved pain control throughout the shift  Outcome: Progressing  Goal: Turns in bed with improved pain control throughout the shift  Outcome: Progressing  Goal: Walks with improved pain control throughout the shift  Outcome: Progressing  Goal: Performs ADL's with improved pain control throughout shift  Outcome: Progressing  Goal: Participates in PT with improved pain control throughout the shift  Outcome: Progressing  Goal: Free from opioid side effects throughout the shift  Outcome: Progressing  Goal: Free from acute confusion related to pain meds throughout the shift  Outcome: Progressing

## 2025-07-14 NOTE — PROGRESS NOTES
"Lanie Pablo is a 70 y.o. female on day 0 of admission presenting with Acute cystitis without hematuria.    Subjective   No acute events overnight.        Objective     Physical Exam  No acute distress  On room air, breathing comfortably   A&Ox3  OU3R, EOMI   Hearing intact   RUE D5 / B5 / T5 / HG 5/ IO 5  LUE D5 / B5 / T5 / HG 5/ IO 5  RLE HF5 / KE 5/ DF 5/ PF 5  LLE HF5 / KE 5/ DF 5/ PF 5  SILT    Last Recorded Vitals  Blood pressure 113/63, pulse 59, temperature 36.1 °C (97 °F), temperature source Tympanic, resp. rate 15, height 1.651 m (5' 5\"), weight 86.2 kg (190 lb), SpO2 95%.  Intake/Output last 3 Shifts:  No intake/output data recorded.    Relevant Results  Results for orders placed or performed during the hospital encounter of 07/13/25 (from the past 24 hours)   Sedimentation rate, automated   Result Value Ref Range    Sedimentation Rate 38 (H) 0 - 30 mm/h   C-reactive protein   Result Value Ref Range    C-Reactive Protein 8.92 (H) <1.00 mg/dL   CBC and Auto Differential   Result Value Ref Range    WBC 9.2 4.4 - 11.3 x10*3/uL    nRBC 0.0 0.0 - 0.0 /100 WBCs    RBC 2.83 (L) 4.00 - 5.20 x10*6/uL    Hemoglobin 8.0 (L) 12.0 - 16.0 g/dL    Hematocrit 24.6 (L) 36.0 - 46.0 %    MCV 87 80 - 100 fL    MCH 28.3 26.0 - 34.0 pg    MCHC 32.5 32.0 - 36.0 g/dL    RDW 13.4 11.5 - 14.5 %    Platelets 325 150 - 450 x10*3/uL    Neutrophils % 64.4 40.0 - 80.0 %    Immature Granulocytes %, Automated 0.5 0.0 - 0.9 %    Lymphocytes % 23.5 13.0 - 44.0 %    Monocytes % 8.5 2.0 - 10.0 %    Eosinophils % 2.7 0.0 - 6.0 %    Basophils % 0.4 0.0 - 2.0 %    Neutrophils Absolute 5.91 (H) 1.60 - 5.50 x10*3/uL    Immature Granulocytes Absolute, Automated 0.05 0.00 - 0.50 x10*3/uL    Lymphocytes Absolute 2.16 0.80 - 3.00 x10*3/uL    Monocytes Absolute 0.78 0.05 - 0.80 x10*3/uL    Eosinophils Absolute 0.25 0.00 - 0.40 x10*3/uL    Basophils Absolute 0.04 0.00 - 0.10 x10*3/uL   Basic metabolic panel   Result Value Ref Range    Glucose 98 " 74 - 99 mg/dL    Sodium 135 (L) 136 - 145 mmol/L    Potassium 3.7 3.5 - 5.3 mmol/L    Chloride 101 98 - 107 mmol/L    Bicarbonate 28 21 - 32 mmol/L    Anion Gap 10 10 - 20 mmol/L    Urea Nitrogen 12 6 - 23 mg/dL    Creatinine 1.14 (H) 0.50 - 1.05 mg/dL    eGFR 51 (L) >60 mL/min/1.73m*2    Calcium 8.5 (L) 8.6 - 10.6 mg/dL   Osmolality   Result Value Ref Range    Osmolality, Serum 283 280 - 300 mOsm/kg   Urinalysis with Reflex Culture and Microscopic   Result Value Ref Range    Color, Urine Yellow Light-Yellow, Yellow, Dark-Yellow    Appearance, Urine Clear Clear    Specific Gravity, Urine 1.020 1.005 - 1.035    pH, Urine 5.5 5.0, 5.5, 6.0, 6.5, 7.0, 7.5, 8.0    Protein, Urine NEGATIVE NEGATIVE, 10 (TRACE), 20 (TRACE) mg/dL    Glucose, Urine Normal Normal mg/dL    Blood, Urine NEGATIVE NEGATIVE mg/dL    Ketones, Urine NEGATIVE NEGATIVE mg/dL    Bilirubin, Urine NEGATIVE NEGATIVE mg/dL    Urobilinogen, Urine Normal Normal mg/dL    Nitrite, Urine NEGATIVE NEGATIVE    Leukocyte Esterase, Urine 500 Agustín/uL (A) NEGATIVE   Extra Urine Gray Tube   Result Value Ref Range    Extra Tube     Microscopic Only, Urine   Result Value Ref Range    WBC, Urine 6-10 (A) 1-5, NONE /HPF    RBC, Urine 3-5 NONE, 1-2, 3-5 /HPF    Squamous Epithelial Cells, Urine 1-9 (SPARSE) Reference range not established. /HPF    Bacteria, Urine 1+ (A) NONE SEEN /HPF   CBC and Auto Differential   Result Value Ref Range    WBC 8.8 4.4 - 11.3 x10*3/uL    nRBC 0.0 0.0 - 0.0 /100 WBCs    RBC 2.87 (L) 4.00 - 5.20 x10*6/uL    Hemoglobin 8.2 (L) 12.0 - 16.0 g/dL    Hematocrit 24.9 (L) 36.0 - 46.0 %    MCV 87 80 - 100 fL    MCH 28.6 26.0 - 34.0 pg    MCHC 32.9 32.0 - 36.0 g/dL    RDW 13.4 11.5 - 14.5 %    Platelets 320 150 - 450 x10*3/uL    Neutrophils % 73.1 40.0 - 80.0 %    Immature Granulocytes %, Automated 0.7 0.0 - 0.9 %    Lymphocytes % 15.5 13.0 - 44.0 %    Monocytes % 7.9 2.0 - 10.0 %    Eosinophils % 2.5 0.0 - 6.0 %    Basophils % 0.3 0.0 - 2.0 %     Neutrophils Absolute 6.41 (H) 1.60 - 5.50 x10*3/uL    Immature Granulocytes Absolute, Automated 0.06 0.00 - 0.50 x10*3/uL    Lymphocytes Absolute 1.36 0.80 - 3.00 x10*3/uL    Monocytes Absolute 0.69 0.05 - 0.80 x10*3/uL    Eosinophils Absolute 0.22 0.00 - 0.40 x10*3/uL    Basophils Absolute 0.03 0.00 - 0.10 x10*3/uL   Renal Function Panel   Result Value Ref Range    Glucose 107 (H) 74 - 99 mg/dL    Sodium 141 136 - 145 mmol/L    Potassium 4.3 3.5 - 5.3 mmol/L    Chloride 105 98 - 107 mmol/L    Bicarbonate 28 21 - 32 mmol/L    Anion Gap 12 10 - 20 mmol/L    Urea Nitrogen 12 6 - 23 mg/dL    Creatinine 1.06 (H) 0.50 - 1.05 mg/dL    eGFR 56 (L) >60 mL/min/1.73m*2    Calcium 8.4 (L) 8.6 - 10.6 mg/dL    Phosphorus 3.4 2.5 - 4.9 mg/dL    Albumin 3.1 (L) 3.4 - 5.0 g/dL   Magnesium   Result Value Ref Range    Magnesium 1.86 1.60 - 2.40 mg/dL   Lactate   Result Value Ref Range    Lactate 0.5 0.4 - 2.0 mmol/L     XR chest 1 view  Result Date: 7/13/2025  Interpreted By:  Azeem Mast and Stevens Alex STUDY: XR CHEST 1 VIEW;  7/13/2025 6:43 pm   INDICATION: Signs/Symptoms:r/o PNA.     COMPARISON: XR chest 06/17/2025   ACCESSION NUMBER(S): [id number]   ORDERING CLINICIAN: MAAME MAHAN   FINDINGS: AP radiograph of the chest was provided.       CARDIOMEDIASTINAL SILHOUETTE: Cardiomediastinal silhouette is normal in size and configuration.   LUNGS: No focal consolidation, pleural effusion, or pneumothorax.   ABDOMEN: No remarkable upper abdominal findings.   BONES: No acute osseous changes.       1.  No evidence of acute cardiopulmonary process.   I personally reviewed the images/study and I agree with the findings as stated by Cliff Alva DO PGY-3. This study was interpreted at [place], [place].   MACRO: None   Signed by: Azeem Mast 7/13/2025 6:57 PM Dictation workstation:   [id number]        Assessment & Plan  MERCEDES (acute kidney injury)    Urinary tract infection  without hematuria, site unspecified    Pt w h/o CHF, HLD, Afib, CKD3, asthma, orthostatic hypotension, vasovagal syncope, Iron deficiency anemia, GERD, s/p gastric sleeve 2005, bowel incontinence p/w back pain, b/l sciatica, gait instability, 7/1 s/p exploration of prior T10-pelvis, sacral and pelvic instrumentation, 7/8 discharged to rehab, 7/11 p/w fever, superficial wound dehiscence     Hospitalist primary  Dispo on PO Bactrim x10d  Aquacel AG (dressing changes ordered)      Earnestine Vásquez MD

## 2025-07-15 VITALS
HEIGHT: 65 IN | OXYGEN SATURATION: 97 % | RESPIRATION RATE: 18 BRPM | BODY MASS INDEX: 31.66 KG/M2 | DIASTOLIC BLOOD PRESSURE: 65 MMHG | TEMPERATURE: 97.7 F | HEART RATE: 74 BPM | SYSTOLIC BLOOD PRESSURE: 134 MMHG | WEIGHT: 190.04 LBS

## 2025-07-15 PROCEDURE — 99239 HOSP IP/OBS DSCHRG MGMT >30: CPT | Performed by: INTERNAL MEDICINE

## 2025-07-15 PROCEDURE — 2500000001 HC RX 250 WO HCPCS SELF ADMINISTERED DRUGS (ALT 637 FOR MEDICARE OP): Performed by: INTERNAL MEDICINE

## 2025-07-15 PROCEDURE — 96361 HYDRATE IV INFUSION ADD-ON: CPT

## 2025-07-15 PROCEDURE — 2500000002 HC RX 250 W HCPCS SELF ADMINISTERED DRUGS (ALT 637 FOR MEDICARE OP, ALT 636 FOR OP/ED): Performed by: NURSE PRACTITIONER

## 2025-07-15 PROCEDURE — 96372 THER/PROPH/DIAG INJ SC/IM: CPT | Performed by: NURSE PRACTITIONER

## 2025-07-15 PROCEDURE — 97162 PT EVAL MOD COMPLEX 30 MIN: CPT | Mod: GP

## 2025-07-15 PROCEDURE — 2500000004 HC RX 250 GENERAL PHARMACY W/ HCPCS (ALT 636 FOR OP/ED): Performed by: INTERNAL MEDICINE

## 2025-07-15 PROCEDURE — G0378 HOSPITAL OBSERVATION PER HR: HCPCS

## 2025-07-15 PROCEDURE — 2500000001 HC RX 250 WO HCPCS SELF ADMINISTERED DRUGS (ALT 637 FOR MEDICARE OP): Performed by: NURSE PRACTITIONER

## 2025-07-15 PROCEDURE — 2500000004 HC RX 250 GENERAL PHARMACY W/ HCPCS (ALT 636 FOR OP/ED): Performed by: NURSE PRACTITIONER

## 2025-07-15 RX ORDER — MIDODRINE HYDROCHLORIDE 5 MG/1
5 TABLET ORAL
Status: DISCONTINUED | OUTPATIENT
Start: 2025-07-15 | End: 2025-07-15 | Stop reason: HOSPADM

## 2025-07-15 RX ORDER — OXYCODONE HYDROCHLORIDE 5 MG/1
5 TABLET ORAL EVERY 6 HOURS PRN
Refills: 0 | Status: DISCONTINUED | OUTPATIENT
Start: 2025-07-15 | End: 2025-07-15 | Stop reason: HOSPADM

## 2025-07-15 RX ORDER — OXYCODONE HYDROCHLORIDE 5 MG/1
5 TABLET ORAL EVERY 6 HOURS PRN
Start: 2025-07-15

## 2025-07-15 RX ORDER — MIDODRINE HYDROCHLORIDE 5 MG/1
5 TABLET ORAL
Start: 2025-07-15

## 2025-07-15 RX ADMIN — HEPARIN SODIUM 5000 UNITS: 5000 INJECTION, SOLUTION INTRAVENOUS; SUBCUTANEOUS at 06:24

## 2025-07-15 RX ADMIN — ACETAMINOPHEN 650 MG: 325 TABLET ORAL at 02:03

## 2025-07-15 RX ADMIN — PREGABALIN 50 MG: 25 CAPSULE ORAL at 09:27

## 2025-07-15 RX ADMIN — OXYCODONE HYDROCHLORIDE 5 MG: 5 TABLET ORAL at 11:50

## 2025-07-15 RX ADMIN — CYCLOBENZAPRINE 10 MG: 10 TABLET, FILM COATED ORAL at 02:03

## 2025-07-15 RX ADMIN — Medication 1 TABLET: at 09:27

## 2025-07-15 RX ADMIN — DOCUSATE SODIUM 50 MG AND SENNOSIDES 8.6 MG 1 TABLET: 8.6; 5 TABLET, FILM COATED ORAL at 09:27

## 2025-07-15 RX ADMIN — HEPARIN SODIUM 5000 UNITS: 5000 INJECTION, SOLUTION INTRAVENOUS; SUBCUTANEOUS at 14:19

## 2025-07-15 RX ADMIN — TRAZODONE HYDROCHLORIDE 50 MG: 50 TABLET ORAL at 02:03

## 2025-07-15 RX ADMIN — MIDODRINE HYDROCHLORIDE 5 MG: 5 TABLET ORAL at 11:50

## 2025-07-15 RX ADMIN — SULFAMETHOXAZOLE AND TRIMETHOPRIM 1 TABLET: 800; 160 TABLET ORAL at 09:27

## 2025-07-15 RX ADMIN — SODIUM CHLORIDE, SODIUM LACTATE, POTASSIUM CHLORIDE, AND CALCIUM CHLORIDE 500 ML: 600; 310; 30; 20 INJECTION, SOLUTION INTRAVENOUS at 11:00

## 2025-07-15 RX ADMIN — PANTOPRAZOLE SODIUM 40 MG: 40 TABLET, DELAYED RELEASE ORAL at 06:24

## 2025-07-15 RX ADMIN — CITALOPRAM HYDROBROMIDE 40 MG: 40 TABLET ORAL at 09:26

## 2025-07-15 RX ADMIN — FLECAINIDE ACETATE 50 MG: 50 TABLET ORAL at 09:27

## 2025-07-15 ASSESSMENT — COGNITIVE AND FUNCTIONAL STATUS - GENERAL
MOVING TO AND FROM BED TO CHAIR: A LITTLE
CLIMB 3 TO 5 STEPS WITH RAILING: A LOT
TURNING FROM BACK TO SIDE WHILE IN FLAT BAD: A LITTLE
STANDING UP FROM CHAIR USING ARMS: A LITTLE
MOVING FROM LYING ON BACK TO SITTING ON SIDE OF FLAT BED WITH BEDRAILS: A LITTLE
MOBILITY SCORE: 16
WALKING IN HOSPITAL ROOM: A LOT

## 2025-07-15 ASSESSMENT — PAIN - FUNCTIONAL ASSESSMENT
PAIN_FUNCTIONAL_ASSESSMENT: 0-10

## 2025-07-15 ASSESSMENT — PAIN SCALES - GENERAL
PAINLEVEL_OUTOF10: 5 - MODERATE PAIN
PAINLEVEL_OUTOF10: 0 - NO PAIN
PAINLEVEL_OUTOF10: 7
PAINLEVEL_OUTOF10: 6
PAINLEVEL_OUTOF10: 4

## 2025-07-15 ASSESSMENT — PAIN DESCRIPTION - LOCATION
LOCATION: BACK
LOCATION: BACK

## 2025-07-15 ASSESSMENT — ACTIVITIES OF DAILY LIVING (ADL): ADL_ASSISTANCE: INDEPENDENT

## 2025-07-15 NOTE — DISCHARGE SUMMARY
Discharge Diagnosis  Acute cystitis without hematuria    Issues Requiring Follow-Up  Neuro surgery follow up.     Discharge Meds     Medication List      START taking these medications     midodrine 5 mg tablet; Commonly known as: Proamatine; Take 1 tablet (5   mg) by mouth 3 times daily (morning, midday, late afternoon).   sulfamethoxazole-trimethoprim 800-160 mg tablet; Commonly known as:   Bactrim DS; Take 1 tablet by mouth every 12 hours for 10 days.     CONTINUE taking these medications     acetaminophen 325 mg tablet; Commonly known as: Tylenol; Take 2 tablets   (650 mg) by mouth every 6 hours.   biotin 1 mg capsule   Breo Ellipta 100-25 mcg/dose inhaler; Generic drug: fluticasone   furoate-vilanteroL   cholecalciferol 25 mcg (1,000 units) tablet; Commonly known as: Vitamin   D-3   citalopram 40 mg tablet; Commonly known as: CeleXA   cyanocobalamin 500 mcg tablet; Commonly known as: Vitamin B-12   cyclobenzaprine 10 mg tablet; Commonly known as: Flexeril   flecainide 50 mg tablet; Commonly known as: Tambocor   heparin (porcine) 5,000 unit/mL injection; Inject 1 mL (5,000 Units)   under the skin every 8 hours. Continue until approved ambulation.   magnesium oxide 200 mg split tablet; Commonly known as: Mag-Ox   multivitamin tablet   ondansetron 4 mg tablet; Commonly known as: Zofran; Take 1 tablet (4 mg)   by mouth every 8 hours if needed for vomiting or nausea.   oxyCODONE 5 mg immediate release tablet; Commonly known as: Roxicodone;   Take 1 tablet (5 mg) by mouth every 6 hours if needed for severe pain (7 -   10).   pantoprazole 40 mg EC tablet; Commonly known as: ProtoNix   polyethylene glycol 17 gram packet; Commonly known as: Glycolax,   Miralax; Take 17 g by mouth once daily.   pregabalin 50 mg capsule; Commonly known as: Lyrica   sennosides-docusate sodium 8.6-50 mg tablet; Commonly known as:   Toya-Colace; Take 2 tablets by mouth 2 times a day.   traZODone 50 mg tablet; Commonly known as: Desyrel      STOP taking these medications     diclofenac 75 mg EC tablet; Commonly known as: Voltaren   potassium gluconate 595 mg (99 mg) tablet       Test Results Pending At Discharge  Pending Labs       No current pending labs.            Hospital Course  Patient is a 70 year old female with a PMH of depression, HLD, A fibrillation, orthostatic hypotension, vasovagal syncope, CHF, Asthma, CKD stage 3, MATTHEW, B12 deficiency, GERD, gastric sleeve 2005, bowel incontinence, chronic bilateral low back pain with bilateral sciatica, DDD and OA who was presented to [facility] ED from [facility] acute rehab due to concern for surgical site infection and fever. Pt had temp of 100.4 at rehab. Temp on arrival to ED was 98.6. No leukocytosis on admit. Pt was admitted to medicine under observation for UTI. She was evaluated by neuro surgery and recommended no further intervention.     Issues addressed during the stay:      UTI  - UA: 500 LE; Urine culture: Pending results with sensitivities   - s/p Rocephin 1g while in the ED  Urine cultures no significant growth.      Chronic bilateral lower back pain with sciatica s/p exploration of prior T10-pelvis, sacral and pelvic instrumentation on 7/1  - neurosurgery consulted, appreciate recs  - Neurosurgery recs:   Incision dressed w Aquacel AG and telfa island dressing for incision protection. Pt's CBC is with no leukocytosis, ESR and CRP mildy elevated, UA w 500 leuks o/w neg for nitrite, CXR is unremarkable. Incision is clean and dry with no drainage.   Recommend dispo to rehab on Bactrim (or substitute abx if sulfa allergy is concern) for 10-14 days (given pt's extensive surgery and wound protection also UTI likely source of pt's fever. Pt has neurosurgery follow up on 7/21.   Please ensure pt is discharged with instruction to keep incision clean and dry dressing w daily aquacel AG and telfa dressing.   Ok for dispo from neurosurgery perspective.  - falls precautions  - c/w pain regimen prescribed  last admit: tylenol 650 mg q6h PRN mild,   Oxycodone 5mg q6h PRN moderate to severe pain,   - Clear for discharge per neurosurgery.      Anemia  - Hgb 8.0 on admit--> Hgb 9.1 on 7/10  HB 8.2 on 7/15,   - continue to monitor.      CKD stage 3 (stable)  - Cr 1.14 GFR 51 on admit-->Cr 0.92 GFR 66 on 7/10  - encourage PO intake as tolerated  Creatinine 1.06 on 7/15,   Monitor , avoid NSAIDS.      Afib  - c/w home dose of Flecainide 50 mg PO BID  Not on Aspirin or AC, ( verified with patient)    Discharged to Acute Rehab in a stable condition.  Discharge day management time > 30 minutes.             Pertinent Physical Exam At Time of Discharge;  Vitals:    07/15/25 1306   BP: 134/65   Pulse: 74   Resp: 18   Temp: 36.5 °C (97.7 °F)   SpO2: 97%       Physical Exam  Constitutional:       Appearance: Normal appearance.   HENT:      Head: Normocephalic.      Nose: Nose normal.      Mouth/Throat:      Mouth: Mucous membranes are moist.   Eyes:      Extraocular Movements: Extraocular movements intact.      Pupils: Pupils are equal, round, and reactive to light.   Cardiovascular:      Rate and Rhythm: Normal rate and regular rhythm.      Heart sounds: Normal heart sounds. No murmur heard.     Comments: Dressing noted over the spine, ( neuro surgery examined the wound this am)  Pulmonary:      Effort: No respiratory distress.      Breath sounds: Normal breath sounds. No wheezing.   Abdominal:      General: Bowel sounds are normal. There is no distension.      Palpations: Abdomen is soft.      Tenderness: There is no abdominal tenderness.   Musculoskeletal:         General: Normal range of motion.      Cervical back: Normal range of motion.   Skin:     General: Skin is warm.   Neurological:      General: No focal deficit present.      Mental Status: She is alert and oriented to person, place, and time. Mental status is at baseline.   Psychiatric:         Mood and Affect: Mood normal.         Outpatient Follow-Up  Future  Appointments   Date Time Provider Department Center   7/21/2025 12:00 PM Giovanni Ahuja PA-C [id number] [place]   9/15/2025 10:40 AM Mike Quigley MD [id number] [place]         Teodoro Orozco MD

## 2025-07-15 NOTE — NURSING NOTE
Patient discharged at this time. Left Midline dressing changed and patient discharged with the midline. Report called to the facility I spoke to josue .

## 2025-07-15 NOTE — CARE PLAN
The patient's goals for the shift include pt will be treated for pain as needed    The clinical goals for the shift include Patient will be discharged back to [facility] [facility] today    Patient written for discharge to Acute Rehab. Report called to Angela ramos.Discharged via Ambulance without difficulty.

## 2025-07-15 NOTE — PROGRESS NOTES
07/15/25 1112   Rapid Rounds   Attendance Provider;Care Transitions   Expected Discharge Disposition Rehab  ([facility])     Pt medically ready. Transport confirmed via [facility] ([phone]) for 1430. N2N # [phone]. Attending and pt's RN aware. DC paperwork sent to [facility].

## 2025-07-15 NOTE — PROGRESS NOTES
Physical Therapy    Physical Therapy Evaluation    Patient Name: Lanie Pablo  MRN: [id number]  Department: [place] [address]  Room: 2021/2021-A  Today's Date: 7/15/2025   Time Calculation  Start Time: 0925  Stop Time: 0939  Time Calculation (min): 14 min    Assessment/Plan   PT Assessment  PT Assessment Results: Decreased strength, Decreased range of motion, Decreased endurance, Impaired balance, Decreased mobility  Rehab Prognosis: Excellent  Barriers to Discharge Home: Caregiver assistance, Physical needs  Caregiver Assistance: Caregiver assistance needed per identified barriers - however, level of patient's required assistance exceeds assistance available at home  Physical Needs: Ambulating household distances limited by function/safety, Intermittent mobility assistance needed, Intermittent ADL assistance needed, High falls risk due to function or environment, Stair navigation into home limited by function/safety  Evaluation/Treatment Tolerance: Patient limited by fatigue, Patient tolerated treatment well  Medical Staff Made Aware: Yes  End of Session Communication: Bedside nurse  Assessment Comment: 70 y.o. F h/o recent spinal surgery readmitted w infection. Pt demonstrating impaired strength, balance, and function. Pt will benefit from continued PT in house and after discharge at HIGH intensity (return to facility) to restore independence.  End of Session Patient Position: Bed, 3 rail up, Alarm off, caregiver present  IP OR SWING BED PT PLAN  Inpatient or Swing Bed: Inpatient  PT Plan  Treatment/Interventions: Bed mobility, Transfer training, Gait training, Balance training, Strengthening, Endurance training, Therapeutic exercise, Therapeutic activity  PT Plan: Ongoing PT  PT Frequency: 5 times per week  PT Discharge Recommendations: High intensity level of continued care  PT Recommended Transfer Status: Assist x1  PT - OK to Discharge: Yes    Subjective     PT Visit Info:  PT Received On: 07/15/25  General Visit  "Information:  General  Reason for Referral: infection  Past Medical History Relevant to Rehab: 70 y.o. female with past medical history of afib, asthma, GERD, CKD, CHF, ankylosing spondylitis, who presents from acute reha for fevers and concern of surgical incision infection. Patient originally is s/p prior T10-Pelvis Fusion with revision of amanda fracture at L3-L4 and S1 Fusion bilaterally as well as revision posterolateral arthrodesis L5-S1 on 7/1/25.  Family/Caregiver Present: Yes  Caregiver Feedback: RN and MD present during PT visit.  Prior to Session Communication: Bedside nurse, Physician  Patient Position Received: Bed, 3 rail up, Alarm off, caregiver present  Preferred Learning Style: verbal, auditory  General Comment: Pt resting in bed upon PT entry. Reports feeling fatigued though willing to work with PT. Hopeful for return to rehab facility today.  Home Living:  Home Living  Type of Home: House  Lives With: Spouse  Home Adaptive Equipment: Walker rolling or standard, Cane  Home Layout: One level  Home Access: Stairs to enter with rails  Entrance Stairs-Number of Steps: 4  Bathroom Equipment: Shower chair with back  Prior Level of Function:  Prior Function Per Pt/Caregiver Report  Level of [place]: Independent with ADLs and functional transfers  Receives Help From:  (Spouse)  ADL Assistance: Independent  Homemaking Assistance: Independent  Ambulatory Assistance: Independent  Prior Function Comments: Pt reports she has been ambulating short distances while at rehab facility and that, \"I thought I was doing fine until this all happened.\"  Precautions:  Precautions  Medical Precautions: Fall precautions, Spinal precautions  Post-Surgical Precautions: Spinal precautions      Date/Time Vitals Session Patient Position Pulse Resp SpO2 BP MAP (mmHg)    07/15/25 0925 --  Sitting  63  --  --  88/59  --           Objective   Pain:  Pain Assessment  Pain Assessment: 0-10  0-10 (Numeric) Pain Score: 6  Pain Type: " Acute pain  Pain Location: Back  Cognition:  Cognition  Overall Cognitive Status: Within Functional Limits  Orientation Level: Oriented X4  Attention: Within Functional Limits  Memory: Within Funtional Limits  Insight: Within function limits  Impulsive: Within functional limits  Processing Speed: Within funtional limits    General Assessments:     Activity Tolerance  Endurance: Decreased tolerance for upright activites  Activity Tolerance Comments: Pt reports she experieces rapid onset of fatigue with ambulation.    Sensation  Light Touch: No apparent deficits    Strength  Strength Comments: BLE grossly >4/5 throughout based on function.  Strength  Strength Comments: BLE grossly >4/5 throughout based on function.    Perception  Inattention/Neglect: Appears intact    Coordination  Movements are Fluid and Coordinated: Yes    Postural Control  Postural Control: Within Functional Limits  Posture Comment: slightly flexe    Static Sitting Balance  Static Sitting-Balance Support: Bilateral upper extremity supported  Static Sitting-Level of Assistance: Close supervision    Static Standing Balance  Static Standing-Balance Support: Bilateral upper extremity supported (on a wheeled walker)  Static Standing-Level of Assistance: Contact guard  Functional Assessments:     Bed Mobility  Bed Mobility: Yes  Bed Mobility 1  Bed Mobility 1: Supine to sitting, Sitting to supine  Level of Assistance 1: Minimum assistance    Transfers  Transfer: Yes  Transfer 1  Transfer From 1: Sit to, Stand to  Transfer to 1: Stand, Sit  Transfer Device 1: Walker  Transfer Level of Assistance 1: Minimum assistance, Minimal verbal cues, Minimal tactile cues    Ambulation/Gait Training  Ambulation/Gait Training Performed: Yes  Ambulation/Gait Training 1  Surface 1: Level tile  Device 1: Rolling walker  Assistance 1: Minimum assistance, Arm in arm assistance, Minimal verbal cues  Quality of Gait 1: Inconsistent stride length, Decreased step length,  Shuffling gait, Forward flexed posture  Comments/Distance (ft) 1: 10ft    Stairs  Stairs: No    Outcome Measures:  [facility] Basic Mobility  Turning from your back to your side while in a flat bed without using bedrails: A little  Moving from lying on your back to sitting on the side of a flat bed without using bedrails: A little  Moving to and from bed to chair (including a wheelchair): A little  Standing up from a chair using your arms (e.g. wheelchair or bedside chair): A little  To walk in hospital room: A lot  Climbing 3-5 steps with railing: A lot  Basic Mobility - Total Score: 16    Encounter Problems       Encounter Problems (Active)       Mobility       STG - Patient will ambulate >75ft, wheeled walker, min assist       Start:  07/15/25    Expected End:  07/29/25               PT Transfers       STG - Patient to transfer to and from sit to supine stand by assist       Start:  07/15/25    Expected End:  07/29/25            STG - Patient will transfer sit to and from stand, stand by assist       Start:  07/15/25    Expected End:  07/29/25                   Education Documentation  Precautions, taught by Mahad Woods, PT at 7/15/2025 10:01 AM.  Learner: Patient  Readiness: Acceptance  Method: Explanation  Response: Verbalizes Understanding  Comment: Return to facility, up with assist/use of call light.    Mobility Training, taught by Mahad Woods, PT at 7/15/2025 10:01 AM.  Learner: Patient  Readiness: Acceptance  Method: Explanation  Response: Verbalizes Understanding  Comment: Return to facility, up with assist/use of call light.    Education Comments  No comments found.

## 2025-07-15 NOTE — CARE PLAN
Problem: Pain - Adult  Goal: Verbalizes/displays adequate comfort level or baseline comfort level  Outcome: Progressing     Problem: Safety - Adult  Goal: Free from fall injury  Outcome: Progressing     Problem: Discharge Planning  Goal: Discharge to home or other facility with appropriate resources  Outcome: Progressing     Problem: Chronic Conditions and Co-morbidities  Goal: Patient's chronic conditions and co-morbidity symptoms are monitored and maintained or improved  Outcome: Progressing     Problem: Nutrition  Goal: Nutrient intake appropriate for maintaining nutritional needs  Outcome: Progressing     Problem: Fall/Injury  Goal: Not fall by end of shift  Outcome: Progressing  Goal: Be free from injury by end of the shift  Outcome: Progressing  Goal: Verbalize understanding of personal risk factors for fall in the hospital  Outcome: Progressing  Goal: Verbalize understanding of risk factor reduction measures to prevent injury from fall in the home  Outcome: Progressing  Goal: Use assistive devices by end of the shift  Outcome: Progressing  Goal: Pace activities to prevent fatigue by end of the shift  Outcome: Progressing     Problem: Skin  Goal: Decreased wound size/increased tissue granulation at next dressing change  Outcome: Progressing  Goal: Participates in plan/prevention/treatment measures  Outcome: Progressing  Goal: Prevent/manage excess moisture  Outcome: Progressing  Goal: Prevent/minimize sheer/friction injuries  Outcome: Progressing  Goal: Promote/optimize nutrition  Outcome: Progressing  Goal: Promote skin healing  Outcome: Progressing     Problem: Pain  Goal: Takes deep breaths with improved pain control throughout the shift  Outcome: Progressing  Goal: Turns in bed with improved pain control throughout the shift  Outcome: Progressing  Goal: Walks with improved pain control throughout the shift  Outcome: Progressing  Goal: Performs ADL's with improved pain control throughout shift  Outcome:  Progressing  Goal: Participates in PT with improved pain control throughout the shift  Outcome: Progressing  Goal: Free from opioid side effects throughout the shift  Outcome: Progressing  Goal: Free from acute confusion related to pain meds throughout the shift  Outcome: Progressing   The patient's goals for the shift include pt will be treated for pain as needed    The clinical goals for the shift include Pt's safety will be maintain during this shift

## 2025-07-15 NOTE — PROGRESS NOTES
"  NEUROSURGERY PROGRESS NOTE    Lanie Pablo is a 70 y.o. female on day 0 of admission presenting with Acute cystitis without hematuria.    Subjective   NAEON       Objective     Physical Exam  Exam:  Constitutional: No acute distress  Resp: breathing comfortably      Neuro:   No acute distress  On room air, breathing comfortably   A&Ox3  OU3R, EOMI   Face symmetric, Facial SILT   Tongue midline and symmetric  Shoulder shrugs symmetric  Hearing intact to finger rubs bilaterally  BUE 5/5  BLE 5/5  Negative hernandez  No clonus  SILT             Last Recorded Vitals  Blood pressure (!) 98/49, pulse 56, temperature 36.5 °C (97.7 °F), temperature source Temporal, resp. rate 17, height 1.651 m (5' 5\"), weight 86.2 kg (190 lb 0.6 oz), SpO2 94%.     Intake/Output last 3 Shifts:  No intake/output data recorded.    Labs:  Lab Results   Component Value Date    WBC 8.8 07/14/2025    HGB 8.2 (L) 07/14/2025    HCT 24.9 (L) 07/14/2025    MCV 87 07/14/2025     07/14/2025      Lab Results   Component Value Date    GLUCOSE 107 (H) 07/14/2025    CALCIUM 8.4 (L) 07/14/2025     07/14/2025    K 4.3 07/14/2025    CO2 28 07/14/2025     07/14/2025    BUN 12 07/14/2025    CREATININE 1.06 (H) 07/14/2025      Lab Results   Component Value Date    CREATININE 1.06 (H) 07/14/2025    BUN 12 07/14/2025     07/14/2025    K 4.3 07/14/2025     07/14/2025    CO2 28 07/14/2025      Lab Results   Component Value Date    CALCIUM 8.4 (L) 07/14/2025    PHOS 3.4 07/14/2025      Lab Results   Component Value Date    INR 1.1 06/18/2025    INR 1.1 02/16/2025    INR 2.4 (H) 12/18/2021    PROTIME 11.9 06/18/2025    PROTIME 12.8 02/16/2025    PROTIME 28.6 (H) 12/18/2021        Imaging:  XR chest 1 view   Final Result   1.  No evidence of acute cardiopulmonary process.        I personally reviewed the images/study and I agree with the findings   as stated by Cliff Alva DO PGY-3. This study was interpreted at   [place] " [place], [place].        MACRO:   None        Signed by: Azeem Mast 7/13/2025 6:57 PM   Dictation workstation:   [id number]                Assessment/Plan   Assessment & Plan  Acute cystitis without hematuria    h/o CHF, HLD, Afib, CKD3, asthma, orthostatic hypotension, vasovagal syncope, Iron deficiency anemia, GERD, s/p gastric sleeve 2005, bowel incontinence p/w back pain, b/l sciatica, gait instability, 7/1 s/p exploration of prior T10-pelvis, sacral and pelvic instrumentation, 7/8 discharged to rehab, 7/11 p/w fever, back pain    Plan:  Hospitalist primary,  Dispo on PO Bactrim x10d,   Aquacel AG (dressing changes ordered),   wound care recs,   Dispo in 7/15 AM per primary (transport 2:30)           Robi Dean MD  Department of Neurosurgery   [facility]

## 2025-07-17 ENCOUNTER — TELEPHONE (OUTPATIENT)
Dept: NEUROLOGY | Facility: CLINIC | Age: 70
End: 2025-07-17
Payer: COMMERCIAL

## 2025-07-21 ENCOUNTER — APPOINTMENT (OUTPATIENT)
Dept: NEUROSURGERY | Facility: CLINIC | Age: 70
End: 2025-07-21
Payer: MEDICARE

## 2025-07-21 DIAGNOSIS — M54.42 CHRONIC BILATERAL LOW BACK PAIN WITH BILATERAL SCIATICA: Primary | ICD-10-CM

## 2025-07-21 DIAGNOSIS — M54.6 CHRONIC MIDLINE THORACIC BACK PAIN: ICD-10-CM

## 2025-07-21 DIAGNOSIS — G89.29 CHRONIC MIDLINE THORACIC BACK PAIN: ICD-10-CM

## 2025-07-21 DIAGNOSIS — Z98.1 STATUS POST LUMBAR SPINAL FUSION: ICD-10-CM

## 2025-07-21 DIAGNOSIS — M54.41 CHRONIC BILATERAL LOW BACK PAIN WITH BILATERAL SCIATICA: Primary | ICD-10-CM

## 2025-07-21 DIAGNOSIS — G89.29 CHRONIC BILATERAL LOW BACK PAIN WITH BILATERAL SCIATICA: Primary | ICD-10-CM

## 2025-07-21 DIAGNOSIS — Z98.1 STATUS POST THORACIC SPINAL FUSION: ICD-10-CM

## 2025-07-21 PROCEDURE — 1111F DSCHRG MED/CURRENT MED MERGE: CPT | Performed by: PHYSICIAN ASSISTANT

## 2025-07-21 PROCEDURE — 99024 POSTOP FOLLOW-UP VISIT: CPT | Performed by: PHYSICIAN ASSISTANT

## 2025-07-21 NOTE — PROGRESS NOTES
[facility]  Department of Neurological Surgery  Post Operative Patient Visit      History of Present Illness:  Lanie Pablo is a 70 y.o. year old female who presents post exploration of prior T10 to pelvis fusion revision secondary to amanda fracture by Dr. Mike Quigley on July 1, 2025.  Patient with improvement in overall low back pain though still with aches and pains left lateral lower thoracic as well as lumbar.  Presents in wheelchair today though feels as if she is ambulating with improved stability and endurance over the past few weeks through rehab.  Did present to emergency department at [facility] and subsequent transfer to [facility].  Concern for deep abscess though blood cultures with no growth, wound with no dehiscence or exudate, and neurosurgery team deferred further abscess workup.  Recommended Bactrim on discharge and is subsequently been changed to vancomycin and Zosyn via midline.  Inspection of her incision today shows well-approximated, nonerythemic, nonedematous surgical incision with good fibrotic tissue spanning.  Staples removed on visit today with patient tolerating well and no complications encountered.  She feels she is ready to be discharged from rehab and able to take care of herself regarding ADLs and IADLs.  Would recommend outpatient physical therapy when discharged and will place order for x-rays to be taken just prior to next visit with Dr. Quigley.        14/14 systems reviewed and negative other than what is listed in the history of present illness    Problem List[1]  Medical History[2]  Surgical History[3]  Social History     Tobacco Use    Smoking status: Never     Passive exposure: Never    Smokeless tobacco: Never   Substance Use Topics    Alcohol use: Not Currently     family history includes Atrial fibrillation in her brother; No Known Problems in her father and mother.  Current Medications[4]  Allergies[5]      The above clinical summary has been  dictated with voice recognition software. It has not been proofread for grammatical errors, typographical mistakes, or other semantic inconsistencies.    Thank you for visiting our office today. It was our pleasure to take part in your healthcare.     Do not hesitate to call with any questions regarding your plan of care after leaving at [phone] M-F 8am-4pm.     To clinicians, thank you very much for this kind referral. It is a privilege to partner with you in the care of your patients. My office would be delighted to assist you with any further consultations or with questions regarding the plan of care outlined. Do not hesitate to call the office or contact me directly.       Sincerely,      JOELLE Allen, PA-C  Associate Physician Assistant, Neurosurgery  Clinical   [facility] [place] [address]    Phone: [phone]  Fax: [phone]             [1]   Patient Active Problem List  Diagnosis    COVID    Chronic bilateral low back pain with bilateral sciatica    Moderate major depression (Multi)    Mild intermittent asthma    Localized, primary osteoarthritis    Iron deficiency anemia    Hyperlipidemia    History of lumbar fusion    History of fusion of thoracic spine    Gastroesophageal reflux disease    Essential (primary) hypertension    CKD (chronic kidney disease) stage 3, GFR 30-59 ml/min (Multi)    Chronic diastolic heart failure    Cervical spondylosis without myelopathy    Cervical radiculopathy    Lumbar spondylosis    Obstructive sleep apnea syndrome    Paroxysmal atrial fibrillation (Multi)    Sciatica    Acute cystitis without hematuria   [2]   Past Medical History:  Diagnosis Date    Abnormal EKG     2/16/25--Normal sinus rhythm Left axis deviation Abnormal ECG    Anemia     Arrhythmia     A-Fib on Eliquis    Arthritis     Asthma      Chronic bilateral low back pain with bilateral sciatica     seen by Dr. Mike Quigley 05/08/25    CKD (chronic kidney disease)     reports PCP was watching in the past, not in 2025    GERD (gastroesophageal reflux disease)     controlled    HL (hearing loss)     Personal history of other diseases of the musculoskeletal system and connective tissue 08/13/2021    History of degenerative disc disease    Personal history of other diseases of the respiratory system 08/13/2021    History of bronchitis    Personal history of other endocrine, nutritional and metabolic disease     History of hypercholesterolemia   [3]   Past Surgical History:  Procedure Laterality Date    APPENDECTOMY  08/24/2017    Appendectomy    CERVICAL FUSION  08/24/2017    Cervical Vertebral Fusion    CHOLECYSTECTOMY  08/24/2017    Cholecystectomy    COLON SURGERY  08/13/2021    Colon Surgery    COLONOSCOPY      LUMBAR LAMINECTOMY  08/24/2017    Laminectomy Lumbar    OTHER SURGICAL HISTORY  08/24/2017    Spinal Stereotaxis Stimulation Of Cord    STOMACH SURGERY  2005    gastric sleeve   [4]   Current Outpatient Medications:     acetaminophen (Tylenol) 325 mg tablet, Take 2 tablets (650 mg) by mouth every 6 hours., Disp: , Rfl:     biotin 1 mg capsule, Take 1 tablet by mouth once daily., Disp: , Rfl:     Breo Ellipta 100-25 mcg/dose inhaler, Inhale 1 puff once daily as needed., Disp: , Rfl:     cholecalciferol (Vitamin D-3) 25 MCG (1000 UT) tablet, Take 1 tablet (1,000 Units) by mouth once daily., Disp: , Rfl:     citalopram (CeleXA) 40 mg tablet, Take 1 tablet (40 mg) by mouth once daily., Disp: , Rfl:     cyanocobalamin (Vitamin B-12) 500 mcg tablet, Take 1 tablet (500 mcg) by mouth once daily., Disp: , Rfl:     cyclobenzaprine (Flexeril) 10 mg tablet, Take 1 tablet (10 mg) by mouth 2 times a day as needed., Disp: , Rfl:     flecainide (Tambocor) 50 mg tablet, Take 1 tablet (50 mg) by mouth 2 times a day., Disp: , Rfl:     heparin sodium,porcine  (heparin, porcine,) 5,000 unit/mL injection, Inject 1 mL (5,000 Units) under the skin every 8 hours. Continue until approved ambulation., Disp: , Rfl:     magnesium oxide (Mag-Ox) 200 mg split tablet, Take 2 half tablet (400 mg) by mouth once daily., Disp: , Rfl:     midodrine (Proamatine) 5 mg tablet, Take 1 tablet (5 mg) by mouth 3 times daily (morning, midday, late afternoon)., Disp: , Rfl:     multivitamin tablet, Take 1 tablet by mouth once daily., Disp: , Rfl:     ondansetron (Zofran) 4 mg tablet, Take 1 tablet (4 mg) by mouth every 8 hours if needed for vomiting or nausea., Disp: , Rfl:     oxyCODONE (Roxicodone) 5 mg immediate release tablet, Take 1 tablet (5 mg) by mouth every 6 hours if needed for severe pain (7 - 10)., Disp: , Rfl:     pantoprazole (ProtoNix) 40 mg EC tablet, Take 1 tablet (40 mg) by mouth once daily in the morning. Take before meals., Disp: , Rfl:     polyethylene glycol (Glycolax, Miralax) 17 gram packet, Take 17 g by mouth once daily., Disp: , Rfl:     pregabalin (Lyrica) 50 mg capsule, Take 2 capsules (100 mg) by mouth once daily., Disp: , Rfl:     sennosides-docusate sodium (Toya-Colace) 8.6-50 mg tablet, Take 2 tablets by mouth 2 times a day., Disp: , Rfl:     sulfamethoxazole-trimethoprim (Bactrim DS) 800-160 mg tablet, Take 1 tablet by mouth every 12 hours for 10 days., Disp: , Rfl:     traZODone (Desyrel) 50 mg tablet, Take 1 tablet (50 mg) by mouth as needed at bedtime for sleep., Disp: , Rfl:   No current facility-administered medications for this visit.  [5]   Allergies  Allergen Reactions    Nickel Swelling, Rash and Blurred vision    Colchicine Other    Fluticasone Propion-Salmeterol Angioedema and Unknown    Erythromycin Rash     Periorbital dermatitis - worsening sx    Hydroxychloroquine Diarrhea and Hives    Morphine GI Upset and Nausea/vomiting    Sulfa (Sulfonamide Antibiotics) Rash and Unknown

## 2025-07-23 ENCOUNTER — TELEPHONE (OUTPATIENT)
Dept: NEUROSURGERY | Facility: CLINIC | Age: 70
End: 2025-07-23
Payer: COMMERCIAL

## 2025-09-05 ENCOUNTER — HOSPITAL ENCOUNTER (OUTPATIENT)
Dept: RADIOLOGY | Facility: HOSPITAL | Age: 70
Discharge: HOME | End: 2025-09-05
Payer: MEDICARE

## 2025-09-05 DIAGNOSIS — M54.41 CHRONIC BILATERAL LOW BACK PAIN WITH BILATERAL SCIATICA: ICD-10-CM

## 2025-09-05 DIAGNOSIS — Z98.1 STATUS POST LUMBAR SPINAL FUSION: ICD-10-CM

## 2025-09-05 DIAGNOSIS — G89.29 CHRONIC BILATERAL LOW BACK PAIN WITH BILATERAL SCIATICA: ICD-10-CM

## 2025-09-05 DIAGNOSIS — M54.6 CHRONIC MIDLINE THORACIC BACK PAIN: ICD-10-CM

## 2025-09-05 DIAGNOSIS — G89.29 CHRONIC MIDLINE THORACIC BACK PAIN: ICD-10-CM

## 2025-09-05 DIAGNOSIS — M54.42 CHRONIC BILATERAL LOW BACK PAIN WITH BILATERAL SCIATICA: ICD-10-CM

## 2025-09-05 DIAGNOSIS — Z98.1 STATUS POST THORACIC SPINAL FUSION: ICD-10-CM

## 2025-09-05 PROCEDURE — 72070 X-RAY EXAM THORAC SPINE 2VWS: CPT

## 2025-09-05 PROCEDURE — 72100 X-RAY EXAM L-S SPINE 2/3 VWS: CPT

## 2025-09-15 ENCOUNTER — APPOINTMENT (OUTPATIENT)
Facility: CLINIC | Age: 70
End: 2025-09-15
Payer: MEDICARE

## (undated) DEVICE — DRAIN, WOUND, ROUND, W/TROCAR, HOLE PATTERN, 10 IN, MEDIUM, 1/8 X 49 IN

## (undated) DEVICE — DRESSING, PREVENA PLUS, CUSTOMIZABLE, 90CM

## (undated) DEVICE — SUTURE, SILK, 2-0, 30 IN, SH, BLACK

## (undated) DEVICE — KIT, PATIENT CARE, JACKSON TABLE W/PRONE-SAFE HEADREST

## (undated) DEVICE — EVACUATOR, WOUND, CLOSED, 3 SPRING, 400 CC, Y CONNECTING TUBE

## (undated) DEVICE — DRESSING, MEPILEX, POST OP, 8 X 4

## (undated) DEVICE — CLOSURE SYSTEM, DERMABOND, PRINEO, 22CM, STERILE

## (undated) DEVICE — UNID ADAPTIVE SPINE INTELLIGENCE

## (undated) DEVICE — COVER, CART, 45 X 27 X 48 IN, CLEAR

## (undated) DEVICE — Device

## (undated) DEVICE — DRESSING, MEPILEX, BORDER FLEX, 6 X 8

## (undated) DEVICE — MARKER, SKIN, RULER AND LABEL PACK, CUSTOM

## (undated) DEVICE — APPLICATOR, CHLORAPREP, W/ORANGE TINT, 26ML

## (undated) DEVICE — SUTURE, VICRYL, 0, 18 IN, UNDYED

## (undated) DEVICE — DRAPE, C-ARM IMAGE

## (undated) DEVICE — PAD, GROUNDING, ELECTROSURGICAL, W/9 FT CABLE, POLYHESIVE II, ADULT, LF

## (undated) DEVICE — THERAPY UNIT, PREVENA PLUS 125

## (undated) DEVICE — SUTURE, PDS II, 0, 18 IN, CT-1, VIOLET

## (undated) DEVICE — WOUND SYSTEM, DEBRIDEMENT & CLEANING, O.R DUOPAK

## (undated) DEVICE — SPHERE, STEALTHSTATION, 5-PK

## (undated) DEVICE — HEMOSTAT, SURGICEL POWDER 3.0GRAMS

## (undated) DEVICE — CAUTERY, PENCIL, PUSH BUTTON, SMOKE EVAC, 70MM

## (undated) DEVICE — SEALANT, HEMOSTATIC, FLOSEAL, 10 ML

## (undated) DEVICE — TUBING, SMOKE EVAC, 3/8 X 10 FT

## (undated) DEVICE — ELECTRODE, ELECTROSURGICAL, BLADE, INSULATED, ENT/IMA, STERILE

## (undated) DEVICE — SUTURE, POLYSORB, 2-0, 18 IN, GS23, DETACHABLE, MULTIPACK, UNDYED

## (undated) DEVICE — TOOL, MR8 14CM MATCH 3MM

## (undated) DEVICE — SPONGE, HEMOSTATIC, GELATIN, SURGIFOAM, 8 X 12.5 CM X 10 MM

## (undated) DEVICE — DRESSING, MEPILEX, BORDER FLEX, 3 X 3

## (undated) DEVICE — DRAPE, SHEET, FAN FOLDED, HALF, 44 X 58 IN, DISPOSABLE, LF, STERILE

## (undated) DEVICE — ROD, BENDING, DIGITIZER ARRAY

## (undated) DEVICE — SEALER, BIPOLAR, AQUA MANTYS 6.0

## (undated) DEVICE — MANIFOLD, 4 PORT NEPTUNE STANDARD

## (undated) DEVICE — BUR, 3MM X 3.8MM, PRECISION, NEURO DRILL

## (undated) DEVICE — ELECTRODE, ELECTROSURGICAL, BLADE EXT 4 INCH, INSULATED